# Patient Record
Sex: FEMALE | Race: WHITE | NOT HISPANIC OR LATINO | Employment: OTHER | URBAN - METROPOLITAN AREA
[De-identification: names, ages, dates, MRNs, and addresses within clinical notes are randomized per-mention and may not be internally consistent; named-entity substitution may affect disease eponyms.]

---

## 2017-01-17 DIAGNOSIS — R53.83 OTHER FATIGUE: Primary | ICD-10-CM

## 2017-01-17 RX ORDER — BUPROPION HYDROCHLORIDE 150 MG/1
150 TABLET ORAL EVERY MORNING
Qty: 30 TABLET | Refills: 3 | Status: SHIPPED | OUTPATIENT
Start: 2017-01-17 | End: 2017-05-23

## 2017-01-17 NOTE — TELEPHONE ENCOUNTER
Pending Prescriptions:                       Disp   Refills    buPROPion (WELLBUTRIN XL) 150 MG 24 hr ta*30 tab*3            Sig: Take 1 tablet (150 mg) by mouth every morning             Last Written Prescription Date: 9/26/2016  Last Fill Quantity: 30; # refills: 3  Last Office Visit with [facility], [facility] or [facility] prescribing provider:  9/26/2016        Last PHQ-9 score on record=   PHQ-9 SCORE 7/21/2016   Total Score 0       AST       17   3/4/2016  ALT       35   3/4/2016

## 2017-01-17 NOTE — TELEPHONE ENCOUNTER
Prescription approved per [facility] [place] [place] Refill Protocol.  Lindsay Mccrary RN

## 2017-02-06 DIAGNOSIS — E78.5 HYPERLIPIDEMIA WITH TARGET LDL LESS THAN 130: Primary | ICD-10-CM

## 2017-02-06 RX ORDER — SIMVASTATIN 20 MG
20 TABLET ORAL AT BEDTIME
Qty: 90 TABLET | Refills: 0 | Status: SHIPPED | OUTPATIENT
Start: 2017-02-06 | End: 2017-06-27

## 2017-02-06 NOTE — Clinical Note
[address], [address]  [facility] [address]  Phone: [phone]  Fax: [phone]    02/06/2017    Brittany Chan  [address]  [facility] [address]      Dear Brittany:     We recently received a call from your pharmacy requesting a refill of your medication simvastatin (ZOCOR)    A review of your chart indicates that an appointment is required for labs to recheck lipidsr. Please call the clinic at [phone] to schedule your appointment.    We have authorized one refill of your medication to allow time for you to schedule your appointment.    Taking care of your health is important to us, and ongoing visits with your provider are vital to your care.  We look forward to seeing you in the near future.          Sincerely,      Sadaf Polanco MD/Brandy SEVILLA RN

## 2017-02-06 NOTE — TELEPHONE ENCOUNTER
simvastatin (ZOCOR) 20 MG tablet  Last Written Prescription Date: 03/11/2016  Last Fill Quantity: 90, # refills: 3  Last Office Visit with [facility], [facility] or [facility] prescribing provider: 09/01/2016       CHOL      192   3/4/2016  HDL       79   3/4/2016  LDL       94   3/4/2016  TRIG       93   3/4/2016  CHOLHDLRATIO      2.3   10/31/2014

## 2017-02-06 NOTE — TELEPHONE ENCOUNTER
Future lipids ordered, letter sent to schedule labs, please sign if appropriate  Brandy Vickers RN, BS  Clinical Nurse Triage .

## 2017-02-06 NOTE — TELEPHONE ENCOUNTER
Medication is being filled for 1 time refill only due to:  Due for labs in March    Brandy Vickers RN, BS  Clinical Nurse Triage .

## 2017-02-10 ENCOUNTER — TELEPHONE (OUTPATIENT)
Dept: FAMILY MEDICINE | Facility: CLINIC | Age: 62
End: 2017-02-10

## 2017-02-10 NOTE — TELEPHONE ENCOUNTER
Pt struggled with cell phone speaker first 40 seconds of returning our call. We waited for her to sort this out.    Pt calls requesting fasting lab orders now. Pt interpreted 2/6/17 letter as needing lipids now as only one mo refill sent to pharmacy   Informed due for fasting annual visit with Dr. Polanco on or after 3/20/17 - a 90 day refill simvastatin was sent 2/6/17 so should be covered through next visit   And in good humor pt we said good luck figuring out your mobile phone/car technology!  Cristopher Tejada, RN

## 2017-05-16 ENCOUNTER — TELEPHONE (OUTPATIENT)
Dept: FAMILY MEDICINE | Facility: CLINIC | Age: 62
End: 2017-05-16

## 2017-05-16 DIAGNOSIS — E78.5 HYPERLIPIDEMIA WITH TARGET LDL LESS THAN 130: ICD-10-CM

## 2017-05-16 DIAGNOSIS — E28.2 PCOS (POLYCYSTIC OVARIAN SYNDROME): Primary | ICD-10-CM

## 2017-05-16 NOTE — TELEPHONE ENCOUNTER
"Reason for call: Order   Order or referral being requested: LDL, CBC, BMP, A1C, MMR Titers and \"anything else you think she may need\"    Reason for request: pt would like to have labs done before px appt 6/2/17 to be able to discuss at time of appointment.     Date needed: before my next appointment  Has the patient been seen by the PCP for this problem? Not Applicable    Additional comments: LDL order already in, pls call pt to schedule fasting labs after ordered    Phone Number Pt can be reached at: Home number on file [phone] (home)  Best Time: any  Can we leave a detailed message on this number? YES       Everett Doll   05/16/17       "

## 2017-05-23 DIAGNOSIS — R53.83 OTHER FATIGUE: ICD-10-CM

## 2017-05-23 NOTE — TELEPHONE ENCOUNTER
buPROPion (WELLBUTRIN XL) 150 MG 24 hr tablet       Last Written Prescription Date: 1/17/17  Last Fill Quantity: 30; # refills: 3  Last Office Visit with [facility], [facility] or [facility] prescribing provider:  9/26/2016   Next 5 appointments (look out 90 days)     Jun 02, 2017  9:20 AM CDT   PHYSICAL with Sadaf [facility]anco MD   [facility]    [address] [place] [address], [address]  [facility] [address] [phone]                   Last PHQ-9 score on record=   PHQ-9 SCORE 7/21/2016   Total Score 0       Lab Results   Component Value Date    AST 17 03/04/2016     Lab Results   Component Value Date    ALT 35 03/04/2016       HUMZA Jones  May 23, 2017  6:37 PM

## 2017-05-24 RX ORDER — BUPROPION HYDROCHLORIDE 150 MG/1
150 TABLET ORAL EVERY MORNING
Qty: 30 TABLET | Refills: 0 | Status: SHIPPED | OUTPATIENT
Start: 2017-05-24 | End: 2017-06-26

## 2017-05-24 NOTE — TELEPHONE ENCOUNTER
Prescription approved per [facility] [place] Refill Protocol.  Lindsay Mccrayr RN

## 2017-06-02 ENCOUNTER — OFFICE VISIT (OUTPATIENT)
Dept: FAMILY MEDICINE | Facility: CLINIC | Age: 62
End: 2017-06-02
Payer: COMMERCIAL

## 2017-06-02 VITALS
SYSTOLIC BLOOD PRESSURE: 122 MMHG | DIASTOLIC BLOOD PRESSURE: 84 MMHG | TEMPERATURE: 98 F | HEIGHT: 65 IN | HEART RATE: 61 BPM | OXYGEN SATURATION: 100 %

## 2017-06-02 DIAGNOSIS — Z00.00 ENCOUNTER FOR ROUTINE ADULT HEALTH EXAMINATION WITHOUT ABNORMAL FINDINGS: Primary | ICD-10-CM

## 2017-06-02 DIAGNOSIS — E28.2 PCOS (POLYCYSTIC OVARIAN SYNDROME): ICD-10-CM

## 2017-06-02 DIAGNOSIS — E78.5 HYPERLIPIDEMIA WITH TARGET LDL LESS THAN 130: ICD-10-CM

## 2017-06-02 DIAGNOSIS — Z23 NEED FOR MMR VACCINE: ICD-10-CM

## 2017-06-02 DIAGNOSIS — R29.890 LOSS OF HEIGHT: Primary | ICD-10-CM

## 2017-06-02 LAB
ALBUMIN SERPL-MCNC: 4.1 G/DL (ref 3.4–5)
ALP SERPL-CCNC: 76 U/L (ref 40–150)
ALT SERPL W P-5'-P-CCNC: 25 U/L (ref 0–50)
ANION GAP SERPL CALCULATED.3IONS-SCNC: 9 MMOL/L (ref 3–14)
AST SERPL W P-5'-P-CCNC: 20 U/L (ref 0–45)
BILIRUB SERPL-MCNC: 0.4 MG/DL (ref 0.2–1.3)
BUN SERPL-MCNC: 14 MG/DL (ref 7–30)
CALCIUM SERPL-MCNC: 8.7 MG/DL (ref 8.5–10.1)
CHLORIDE SERPL-SCNC: 105 MMOL/L (ref 94–109)
CHOLEST SERPL-MCNC: 201 MG/DL
CO2 SERPL-SCNC: 28 MMOL/L (ref 20–32)
CREAT SERPL-MCNC: 0.66 MG/DL (ref 0.52–1.04)
ERYTHROCYTE [DISTWIDTH] IN BLOOD BY AUTOMATED COUNT: 13 % (ref 10–15)
GFR SERPL CREATININE-BSD FRML MDRD: NORMAL ML/MIN/1.7M2
GLUCOSE SERPL-MCNC: 88 MG/DL (ref 70–99)
HBA1C MFR BLD: 5.4 % (ref 4.3–6)
HCT VFR BLD AUTO: 43.3 % (ref 35–47)
HDLC SERPL-MCNC: 92 MG/DL
HGB BLD-MCNC: 14.2 G/DL (ref 11.7–15.7)
LDLC SERPL CALC-MCNC: 97 MG/DL
MCH RBC QN AUTO: 28.9 PG (ref 26.5–33)
MCHC RBC AUTO-ENTMCNC: 32.8 G/DL (ref 31.5–36.5)
MCV RBC AUTO: 88 FL (ref 78–100)
NONHDLC SERPL-MCNC: 109 MG/DL
PLATELET # BLD AUTO: 243 10E9/L (ref 150–450)
POTASSIUM SERPL-SCNC: 4.1 MMOL/L (ref 3.4–5.3)
PROT SERPL-MCNC: 7.3 G/DL (ref 6.8–8.8)
RBC # BLD AUTO: 4.91 10E12/L (ref 3.8–5.2)
SODIUM SERPL-SCNC: 142 MMOL/L (ref 133–144)
TRIGL SERPL-MCNC: 61 MG/DL
WBC # BLD AUTO: 4.4 10E9/L (ref 4–11)

## 2017-06-02 PROCEDURE — 36415 COLL VENOUS BLD VENIPUNCTURE: CPT | Performed by: FAMILY MEDICINE

## 2017-06-02 PROCEDURE — 86803 HEPATITIS C AB TEST: CPT | Performed by: FAMILY MEDICINE

## 2017-06-02 PROCEDURE — 90471 IMMUNIZATION ADMIN: CPT | Performed by: FAMILY MEDICINE

## 2017-06-02 PROCEDURE — 83036 HEMOGLOBIN GLYCOSYLATED A1C: CPT | Performed by: FAMILY MEDICINE

## 2017-06-02 PROCEDURE — 82306 VITAMIN D 25 HYDROXY: CPT | Performed by: FAMILY MEDICINE

## 2017-06-02 PROCEDURE — 99396 PREV VISIT EST AGE 40-64: CPT | Mod: 25 | Performed by: FAMILY MEDICINE

## 2017-06-02 PROCEDURE — 80053 COMPREHEN METABOLIC PANEL: CPT | Performed by: FAMILY MEDICINE

## 2017-06-02 PROCEDURE — 80061 LIPID PANEL: CPT | Performed by: FAMILY MEDICINE

## 2017-06-02 PROCEDURE — 90707 MMR VACCINE SC: CPT | Performed by: FAMILY MEDICINE

## 2017-06-02 PROCEDURE — 99207 ZZC NO CHARGE NURSE ONLY: CPT | Performed by: INTERNAL MEDICINE

## 2017-06-02 PROCEDURE — 85027 COMPLETE CBC AUTOMATED: CPT | Performed by: FAMILY MEDICINE

## 2017-06-02 RX ORDER — METFORMIN HCL 500 MG
500 TABLET, EXTENDED RELEASE 24 HR ORAL
Qty: 30 TABLET | Refills: 1 | Status: SHIPPED | OUTPATIENT
Start: 2017-06-02 | End: 2017-07-25

## 2017-06-02 RX ORDER — PHENOL 1.4 %
AEROSOL, SPRAY (ML) MUCOUS MEMBRANE
Status: CANCELLED | COMMUNITY
Start: 2017-06-02

## 2017-06-02 RX ORDER — NAPROXEN SODIUM 220 MG
220 TABLET ORAL DAILY
Status: CANCELLED | COMMUNITY
Start: 2017-06-02

## 2017-06-02 RX ORDER — LANOLIN ALCOHOL/MO/W.PET/CERES
CREAM (GRAM) TOPICAL
Status: CANCELLED | COMMUNITY
Start: 2017-06-02

## 2017-06-02 RX ORDER — COVID-19 ANTIGEN TEST
440 KIT MISCELLANEOUS 2 TIMES DAILY WITH MEALS
COMMUNITY
End: 2019-07-05

## 2017-06-02 RX ORDER — LANOLIN ALCOHOL/MO/W.PET/CERES
3 CREAM (GRAM) TOPICAL
COMMUNITY

## 2017-06-02 NOTE — PROGRESS NOTES
SUBJECTIVE:     CC: Brittany Chan is an 62 year old woman who presents for preventive health visit.     Healthy Habits:    Do you get at least three servings of calcium containing foods daily (dairy, green leafy vegetables, etc.)? yes    Amount of exercise or daily activities, outside of work: 3 day(s) per week    Problems taking medications regularly No    Medication side effects: Yes urine is bright yellow    Have you had an eye exam in the past two years? yes    Do you see a dentist twice per year? yes    Do you have sleep apnea, excessive snoring or daytime drowsiness?no          Today's PHQ-2 Score:   PHQ-2 ( 1999 Pfizer) 3/18/2016 3/15/2016   Q1: Little interest or pleasure in doing things 0 -   Q2: Feeling down, depressed or hopeless 0 -   PHQ-2 Score 0 -   Q1: Little interest or pleasure in doing things - Not at all   Q2: Feeling down, depressed or hopeless - Not at all   PHQ-2 Score - 0     Abuse: Current or Past(Physical, Sexual or Emotional)- No  Do you feel safe in your environment - Yes    Social History   Substance Use Topics     Smoking status: Former Smoker     Quit date: 1/1/1980     Smokeless tobacco: Never Used     Alcohol use Yes      Comment: weekends     The patient does not drink >3 drinks per day nor >7 drinks per week.    Recent Labs   Lab Test  03/04/16   0811  10/31/14   0846  10/29/13   0825   CHOL  192  180  180   HDL  79  79  81   LDL  94  87  86   TRIG  93  68  69   CHOLHDLRATIO   --   2.3  2.2   NHDL  113   --    --        Reviewed orders with patient.  Reviewed health maintenance and updated orders accordingly - Yes    Mammo Decision Support:  {Mammo:296651}    Pertinent mammograms are reviewed under the imaging tab.  History of abnormal Pap smear: {PAP HX:169546}    Reviewed and updated as needed this visit by clinical staff  Tobacco  Allergies  Meds  Med Hx  Surg Hx  Fam Hx  Soc Hx        Reviewed and updated as needed this visit by Provider        {HISTORY  "OPTIONS:765048}    ROS:  {FEMALE PREVENTATIVE ROS:338484}    This document serves as a record of the services and decisions personally performed and made by Sadaf Polanco MD. It was created on her behalf by Renita Mcdowell, a trained medical scribe. The creation of this document is based the provider's statements to the medical scribe.  Renita Jeremisa 2017 9:44 AM    OBJECTIVE:     /84 (BP Location: Right arm, Patient Position: Chair, Cuff Size: Adult Regular)  Pulse 61  Temp 98  F (36.7  C) (Oral)  Ht 5' 4.5\" (1.638 m)  LMP 10/05/2012  SpO2 100%  EXAM:  {Exam Choices:433355}    ASSESSMENT/PLAN:     {Diag Picklist:658991}    COUNSELING:   {FEMALE COUNSELING MESSAGES:301801::\"Reviewed preventive health counseling, as reflected in patient instructions\"}    {Blood Pressure Screenin}     reports that she quit smoking about 37 years ago. She has never used smokeless tobacco.    Estimated body mass index is 27.46 kg/(m^2) as calculated from the following:    Height as of 16: 5' 5\" (1.651 m).    Weight as of 16: 165 lb (74.8 kg).   Weight management plan: Discussed healthy diet and exercise guidelines and patient will follow up in 12 months in clinic to re-evaluate.    Counseling Resources:  ATP IV Guidelines  Pooled Cohorts Equation Calculator  Breast Cancer Risk Calculator  FRAX Risk Assessment  ICSI Preventive Guidelines  Dietary Guidelines for Americans,   USDA's MyPlate  ASA Prophylaxis  Lung CA Screening    The information in this document, created by the medical scribe for me, accurately reflects the services I personally performed and the decisions made by me. I have reviewed and approved this document for accuracy prior to leaving the patient care area.  Sadaf Polanco MD 9:44 AM 2017     ***     Sadaf Polanco MD  [facility]  "

## 2017-06-02 NOTE — MR AVS SNAPSHOT
After Visit Summary   6/2/2017    Brittany Chan    MRN: [id number]           Patient Information     Date Of Birth          1955        Visit Information        Provider Department      6/2/2017 9:20 AM Sadaf Polanco MD [facility]        Today's Diagnoses     Hyperlipidemia with target LDL less than 130    -  1    Encounter for routine adult health examination without abnormal findings        Need for MMR vaccine          Care Instructions      Preventive Health Recommendations  Female Ages 50 - 64    Yearly exam: See your health care provider every year in order to  o Review health changes.   o Discuss preventive care.    o Review your medicines if your doctor has prescribed any.      Get a Pap test every three years (unless you have an abnormal result and your provider advises testing more often).    If you get Pap tests with HPV test, you only need to test every 5 years, unless you have an abnormal result.     You do not need a Pap test if your uterus was removed (hysterectomy) and you have not had cancer.    You should be tested each year for STDs (sexually transmitted diseases) if you're at risk.     Have a mammogram every 1 to 2 years.    Have a colonoscopy at age 50, or have a yearly FIT test (stool test). These exams screen for colon cancer.      Have a cholesterol test every 5 years, or more often if advised.    Have a diabetes test (fasting glucose) every three years. If you are at risk for diabetes, you should have this test more often.     If you are at risk for osteoporosis (brittle bone disease), think about having a bone density scan (DEXA).    Shots: Get a flu shot each year. Get a tetanus shot every 10 years.    Nutrition:     Eat at least 5 servings of fruits and vegetables each day.    Eat whole-grain bread, whole-wheat pasta and brown rice instead of white grains and rice.    Talk to your provider about Calcium and Vitamin D.      Lifestyle    Exercise at least 150 minutes a week (30 minutes a day, 5 days a week). This will help you control your weight and prevent disease.    Limit alcohol to one drink per day.    No smoking.     Wear sunscreen to prevent skin cancer.     See your dentist every six months for an exam and cleaning.    See your eye doctor every 1 to 2 years.            Follow-ups after your visit        Future tests that were ordered for you today     Open Future Orders        Priority Expected Expires Ordered    *MA Screening Digital Bilateral Routine  6/2/2018 6/2/2017            Who to contact     If you have questions or need follow up information about today's clinic visit or your schedule please contact [facility] directly at [phone].  Normal or non-critical lab and imaging results will be communicated to you by PromoteSocialhart, letter or phone within 4 business days after the clinic has received the results. If you do not hear from us within 7 days, please contact the clinic through PromoteSocialhart or phone. If you have a critical or abnormal lab result, we will notify you by phone as soon as possible.  Submit refill requests through TuneUp or call your pharmacy and they will forward the refill request to us. Please allow 3 business days for your refill to be completed.          Additional Information About Your Visit        TuneUp Information     TuneUp gives you secure access to your electronic health record. If you see a primary care provider, you can also send messages to your care team and make appointments. If you have questions, please call your primary care clinic.  If you do not have a primary care provider, please call [phone] and they will assist you.        Care EveryWhere ID     This is your Care EveryWhere ID. This could be used by other organizations to access your [facility] medical records  [id number]        Your Vitals Were     Pulse Temperature Height Last Period Pulse Oximetry        "61 98  F (36.7  C) (Oral) 5' 4.5\" (1.638 m) 10/05/2012 100%        Blood Pressure from Last 3 Encounters:   06/02/17 122/84   09/26/16 122/80   09/01/16 108/74    Weight from Last 3 Encounters:   08/05/16 165 lb (74.8 kg)   10/31/14 167 lb (75.8 kg)   09/29/11 177 lb 9.6 oz (80.6 kg)              We Performed the Following     CBC with platelets     Comprehensive metabolic panel     Hemoglobin A1c     Hepatitis C Screen Reflex to HCV RNA Quant and Genotype     Lipid panel reflex to direct LDL     MMR VIRUS IMMUNIZATION, SUBCUT     Vitamin D Deficiency          Today's Medication Changes          These changes are accurate as of: 6/2/17 10:08 AM.  If you have any questions, ask your nurse or doctor.               These medicines have changed or have updated prescriptions.        Dose/Directions    Chondroitin Sulfate A 150 MG Caps   This may have changed:    - medication strength  - how to take this  - additional instructions   Changed by:  Sadaf Polanco MD        600 mg BID   Refills:  0                Primary Care Provider Office Phone # Fax #    Sadaf Polanco [phone]       [facility] [place] [address]  [address]   [facility] [address]        Thank you!     Thank you for choosing [facility]  for your care. Our goal is always to provide you with excellent care. Hearing back from our patients is one way we can continue to improve our services. Please take a few minutes to complete the written survey that you may receive in the mail after your visit with us. Thank you!             Your Updated Medication List - Protect others around you: Learn how to safely use, store and throw away your medicines at www.disposemymeds.org.          This list is accurate as of: 6/2/17 10:08 AM.  Always use your most recent med list.                   Brand Name Dispense Instructions for use    buPROPion 150 MG 24 hr tablet    WELLBUTRIN XL    30 tablet    Take 1 tablet (150 mg) " by mouth every morning       Chondroitin Sulfate A 150 MG Caps      600 mg BID       ciprofloxacin 500 MG tablet    CIPRO    14 tablet    Take 1 tablet (500 mg) by mouth 2 times daily       ESTROVEN NIGHTTIME PO          ESTROVEN PO          MOTRIN 600 MG tablet   Generic drug:  ibuprofen      1 TABLET 3 TIMES DAILY PRN       nitrofurantoin (macrocrystal-monohydrate) 100 MG capsule    MACROBID    10 capsule    Take 1 capsule (100 mg) by mouth 2 times daily       omeprazole 20 MG CR capsule    priLOSEC    90 capsule    Take 1 capsule by mouth daily.       PROBIOTIC PO          RANITIDINE HCL PO          simvastatin 20 MG tablet    ZOCOR    90 tablet    Take 1 tablet (20 mg) by mouth At Bedtime at bedtime.       vitamin D 1000 UNITS capsule      Take 2 capsules by mouth daily.

## 2017-06-02 NOTE — PROGRESS NOTES
"   SUBJECTIVE:     CC: Brittany Chan is an 62 year old woman who presents for preventive health visit.     Healthy Habits:    Do you get at least three servings of calcium containing foods daily (dairy, green leafy vegetables, etc.)? yes    Amount of exercise or daily activities, outside of work: 3 day(s) per week    Problems taking medications regularly No    Medication side effects: Yes urine is bright yellow    Have you had an eye exam in the past two years? yes    Do you see a dentist twice per year? yes    Do you have sleep apnea, excessive snoring or daytime drowsiness?no    Additional problems:  She would like to have titers done for MMR. Knows she had mumps in her childhood. Unsure if she has been traveling or been in [facility]. Does work for [employer].     Blood pressure: would like to address her BP. Notes her diastolic is not \"what it used to be.\" Checks at home. Does watch her weight on her own. Thinks her BMI is 28 ness. Weight is 159 lbs at home.    Checks weight at home. Refuses to be weighted here  BP Readings from Last 3 Encounters:   06/02/17 122/84   09/26/16 122/80   09/01/16 108/74   Low libido: no pain during sex but orgasms are still not as intense. No vaginal dryness. On wellbutrin to help libido  Family history of breast cancer. Paternal and maternal grandmothers. One was in her 50's.   GERD: Did give up coffee, switched to vick tea and a \"sweet and spicy tea.\" Still takes 20 mg of prilosec and 150 mg of zantac. Has not tried skipping at any point to see if heartburn returns.   Sugars: would like to check sugars. Does mention she has PCOS.   Lab Results   Component Value Date    A1C 5.6 03/04/2016    A1C 5.5 10/31/2014    A1C 5.2 10/29/2013    A1C 5.1 07/01/2010    A1C 5.2 04/28/2009               Today's PHQ-2 Score:   PHQ-2 ( 1999 Pfizer) 3/18/2016 3/15/2016   Q1: Little interest or pleasure in doing things 0 -   Q2: Feeling down, depressed or hopeless 0 -   PHQ-2 Score 0 -   Q1: " Little interest or pleasure in doing things - Not at all   Q2: Feeling down, depressed or hopeless - Not at all   PHQ-2 Score - 0     Abuse: Current or Past(Physical, Sexual or Emotional)- No  Do you feel safe in your environment - Yes    Social History   Substance Use Topics     Smoking status: Former Smoker     Quit date: 1/1/1980     Smokeless tobacco: Never Used     Alcohol use Yes      Comment: weekends     The patient does not drink >3 drinks per day nor >7 drinks per week.    Recent Labs   Lab Test  03/04/16   0811  10/31/14   0846  10/29/13   0825   CHOL  192  180  180   HDL  79  79  81   LDL  94  87  86   TRIG  93  68  69   CHOLHDLRATIO   --   2.3  2.2   NHDL  113   --    --        Reviewed orders with patient.  Reviewed health maintenance and updated orders accordingly - Yes    Mammo Decision Support:  Patient over age 50, mutual decision to screen reflected in health maintenance.    Pertinent mammograms are reviewed under the imaging tab.  History of abnormal Pap smear: YES - updated in Problem List and Health Maintenance accordingly  Lab Results   Component Value Date    PAP NIL 03/18/2016    PAP ASC-US 01/19/2015    PAP ASC-US 10/29/2013         Reviewed and updated as needed this visit by clinical staff  Tobacco  Allergies  Meds  Med Hx  Surg Hx  Fam Hx  Soc Hx        Reviewed and updated as needed this visit by Provider            ROS:  C: NEGATIVE for fever, chills, change in weight  I: NEGATIVE for worrisome rashes, moles or lesions  E: NEGATIVE for vision changes or irritation  ENT: NEGATIVE for ear, mouth and throat problems  R: NEGATIVE for significant cough or SOB  B: NEGATIVE for masses, tenderness or discharge  CV: NEGATIVE for chest pain, palpitations or peripheral edema  GI: NEGATIVE for nausea, abdominal pain, heartburn, or change in bowel habits  : NEGATIVE for unusual urinary or vaginal symptoms. No vaginal bleeding.  M: NEGATIVE for significant arthralgias or myalgia  N: NEGATIVE  "for weakness, dizziness or paresthesias  P: NEGATIVE for changes in mood or affect     This document serves as a record of the services and decisions personally performed and made by Sadaf Polanco MD. It was created on her behalf by Renita Mcdowell, a trained medical scribe. The creation of this document is based the provider's statements to the medical scribe.  Renita Mcdowell June 2, 2017 9:44 AM    OBJECTIVE:     /84 (BP Location: Right arm, Patient Position: Chair, Cuff Size: Adult Regular)  Pulse 61  Temp 98  F (36.7  C) (Oral)  Ht 5' 4.5\" (1.638 m)  LMP 10/05/2012  SpO2 100%  EXAM:  GENERAL APPEARANCE: healthy, alert and no distress  EYES: Eyes grossly normal to inspection, PERRL and conjunctivae and sclerae normal  HENT: ear canals and TM's normal, nose and mouth without ulcers or lesions, oropharynx clear and oral mucous membranes moist  NECK: no adenopathy, no asymmetry, masses, or scars and thyroid normal to palpation  RESP: lungs clear to auscultation - no rales, rhonchi or wheezes  BREAST: normal without masses, tenderness or nipple discharge and no palpable axillary masses or adenopathy  CV: regular rate and rhythm, normal S1 S2, no S3 or S4, no murmur, click or rub, no peripheral edema and peripheral pulses strong  ABDOMEN: soft, nontender, no hepatosplenomegaly, no masses and bowel sounds normal  MS: no musculoskeletal defects are noted and gait is age appropriate without ataxia  SKIN: no suspicious lesions or rashes  NEURO: Normal strength and tone, sensory exam grossly normal, mentation intact and speech normal  PSYCH: mentation appears normal and affect normal/bright    ASSESSMENT/PLAN:     (Z00.00) Encounter for routine adult health examination without abnormal findings  (primary encounter diagnosis)  Comment: Normal exam. Mammogram ordered. Fasting for labs today.Discussed healthy diet and exercise, especially to help prevent diabetes. For GERD-advised to try going every other day with " "prilosec. If heart burns returns ok to go back to everyday.   Plan: *MA Screening Digital Bilateral, Lipid panel         reflex to direct LDL, Comprehensive metabolic         panel, Vitamin D Deficiency, CBC with         platelets, Hepatitis C Screen Reflex to HCV RNA        Quant and Genotype, Hemoglobin A1c        Follow up in one year for physical.     (E78.5) Hyperlipidemia with target LDL less than 130  Comment: Fasting for lipid panel.   Plan: CANCELED: Lipid panel reflex to direct LDL        Follow up in one year.     (Z23) Need for MMR vaccine  Comment: Discussed titers vs vaccine. Patient agreed to vaccine.   Plan: MMR VIRUS IMMUNIZATION, SUBCUT        Follow up as needed.     (E28.2) PCOS (polycystic ovarian syndrome)  Comment: Will start on metformin once a day to help control sugars. Discussed healthy diet, exercise, and weight loss will all help prevent diabetes. Recommend weight loss  Plan: metFORMIN (GLUCOPHAGE-XR) 500 MG 24 hr tablet        Follow up in 1-2 months.       COUNSELING:   Reviewed preventive health counseling, as reflected in patient instructions       Regular exercise       Healthy diet/nutrition       Vision screening       Hearing screening       Immunizations    Vaccinated for: MMR  , pt asking for titers, discussed this vs vaccine, pt chose vaccine         Alcohol Use    BP Screening:   Last 3 BP Readings:    BP Readings from Last 3 Encounters:   06/02/17 122/84   09/26/16 122/80   09/01/16 108/74       The following was recommended to the patient:  Re-screen BP within a year and recommended lifestyle modifications     reports that she quit smoking about 37 years ago. She has never used smokeless tobacco.    Estimated body mass index is 27.46 kg/(m^2) as calculated from the following:    Height as of 8/5/16: 5' 5\" (1.651 m).    Weight as of 8/5/16: 165 lb (74.8 kg).   Weight management plan: Discussed healthy diet and exercise guidelines and patient will follow up in 12 months in " clinic to re-evaluate.    Counseling Resources:  ATP IV Guidelines  Pooled Cohorts Equation Calculator  Breast Cancer Risk Calculator  FRAX Risk Assessment  ICSI Preventive Guidelines  Dietary Guidelines for Americans, 2010  USDA's MyPlate  ASA Prophylaxis  Lung CA Screening    The information in this document, created by the medical scribe for me, accurately reflects the services I personally performed and the decisions made by me. I have reviewed and approved this document for accuracy prior to leaving the patient care area.  Sadaf Polanco MD 9:44 AM 6/2/2017          Sadaf Polanco MD  [facility]

## 2017-06-02 NOTE — NURSING NOTE
"Chief Complaint   Patient presents with     Physical     fasting; no pap needed       Initial /84 (BP Location: Right arm, Patient Position: Chair, Cuff Size: Adult Regular)  Pulse 61  Temp 98  F (36.7  C) (Oral)  Ht 5' 4.5\" (1.638 m)  LMP 10/05/2012  SpO2 100% Estimated body mass index is 27.46 kg/(m^2) as calculated from the following:    Height as of 8/5/16: 5' 5\" (1.651 m).    Weight as of 8/5/16: 165 lb (74.8 kg).  Medication Reconciliation: incomplete   Mandy Maurice CMA ([facility])      "

## 2017-06-05 LAB
DEPRECATED CALCIDIOL+CALCIFEROL SERPL-MC: 54 UG/L (ref 20–75)
HCV AB SERPL QL IA: NORMAL

## 2017-06-23 ENCOUNTER — HOSPITAL ENCOUNTER (OUTPATIENT)
Dept: MAMMOGRAPHY | Facility: CLINIC | Age: 62
Discharge: HOME | End: 2017-06-23
Attending: FAMILY MEDICINE | Admitting: FAMILY MEDICINE
Payer: COMMERCIAL

## 2017-06-23 DIAGNOSIS — Z00.00 ENCOUNTER FOR ROUTINE ADULT HEALTH EXAMINATION WITHOUT ABNORMAL FINDINGS: ICD-10-CM

## 2017-06-23 PROCEDURE — G0202 SCR MAMMO BI INCL CAD: HCPCS

## 2017-06-26 DIAGNOSIS — R53.83 OTHER FATIGUE: ICD-10-CM

## 2017-06-26 NOTE — TELEPHONE ENCOUNTER
buPROPion (WELLBUTRIN XL) 150 MG 24 hr tablet       Last Written Prescription Date: 5/24/17  Last Fill Quantity: 30; # refills: 0  Last Office Visit with G, P or [facility] prescribing provider:  6/2/2017        Last PHQ-9 score on record=   PHQ-9 SCORE 7/21/2016   Total Score 0       Lab Results   Component Value Date    AST 20 06/02/2017     Lab Results   Component Value Date    ALT 25 06/02/2017     HUMZA Jones  June 26, 2017  2:33 PM

## 2017-06-27 DIAGNOSIS — E78.5 HYPERLIPIDEMIA WITH TARGET LDL LESS THAN 130: ICD-10-CM

## 2017-06-27 RX ORDER — SIMVASTATIN 20 MG
20 TABLET ORAL AT BEDTIME
Qty: 90 TABLET | Refills: 3 | Status: SHIPPED | OUTPATIENT
Start: 2017-06-27 | End: 2018-06-19

## 2017-06-27 RX ORDER — BUPROPION HYDROCHLORIDE 150 MG/1
150 TABLET ORAL EVERY MORNING
Qty: 30 TABLET | Refills: 5 | Status: SHIPPED | OUTPATIENT
Start: 2017-06-27 | End: 2017-12-13

## 2017-06-27 NOTE — TELEPHONE ENCOUNTER
Prescription approved per [facility] Refill Protocol.  Lindsay Mccrary RN

## 2017-06-27 NOTE — TELEPHONE ENCOUNTER
Pending Prescriptions:                       Disp   Refills    simvastatin (ZOCOR) 20 MG tablet          90 tab*0            Sig: Take 1 tablet (20 mg) by mouth At Bedtime at           bedtime.  Simvastatin      Last Written Prescription Date: 2/6/2017  Last Fill Quantity: 90, # refills: 0  Last Office Visit with [facility], [facility] or [facility] prescribing provider: 6/23/2017       Lab Results   Component Value Date    CHOL 201 06/02/2017     Lab Results   Component Value Date    HDL 92 06/02/2017     Lab Results   Component Value Date    LDL 97 06/02/2017     Lab Results   Component Value Date    TRIG 61 06/02/2017     Lab Results   Component Value Date    CHOLHDLRATIO 2.3 10/31/2014

## 2017-06-29 ENCOUNTER — TELEPHONE (OUTPATIENT)
Dept: FAMILY MEDICINE | Facility: CLINIC | Age: 62
End: 2017-06-29

## 2017-06-29 NOTE — TELEPHONE ENCOUNTER
Spoke with pharmacist.  Patient has not had this rx filled since July 2016.  Medication was discontinued.  RX was on auto refill.  Pharmacist will disregard.  RX request canceled.  Lindsay Mccrary RN

## 2017-07-25 DIAGNOSIS — E28.2 PCOS (POLYCYSTIC OVARIAN SYNDROME): ICD-10-CM

## 2017-07-25 NOTE — TELEPHONE ENCOUNTER
Pending Prescriptions:                       Disp   Refills    metFORMIN (GLUCOPHAGE-XR) 500 MG 24 hr ta*30 tab*1            Sig: Take 1 tablet (500 mg) by mouth daily (with           dinner)    metFORMIN (GLUCOPHAGE-XR) 500 MG 24 hr tablet         Last Written Prescription Date: 06/12/17  Last Fill Quantity: 30, # refills: 1  Last Office Visit with [facility] or [facility] prescribing provider:  06/12/17        BP Readings from Last 3 Encounters:   06/02/17 122/84   09/26/16 122/80   09/01/16 108/74     No results found for: MICROL  No results found for: UMALCR  Creatinine   Date Value Ref Range Status   06/02/2017 0.66 0.52 - 1.04 mg/dL Final   ]  GFR Estimate   Date Value Ref Range Status   06/02/2017 >90  Non  GFR Calc   >60 mL/min/1.7m2 Final   03/04/2016 90 >60 mL/min/1.7m2 Final     Comment:     Non  GFR Calc   01/19/2015 >90  Non  GFR Calc   >60 mL/min/1.7m2 Final     GFR Estimate If Black   Date Value Ref Range Status   06/02/2017 >90   GFR Calc   >60 mL/min/1.7m2 Final   03/04/2016 >90   GFR Calc   >60 mL/min/1.7m2 Final   01/19/2015 >90   GFR Calc   >60 mL/min/1.7m2 Final     Lab Results   Component Value Date    CHOL 201 06/02/2017     Lab Results   Component Value Date    HDL 92 06/02/2017     Lab Results   Component Value Date    LDL 97 06/02/2017     Lab Results   Component Value Date    TRIG 61 06/02/2017     Lab Results   Component Value Date    CHOLHDLRATIO 2.3 10/31/2014     Lab Results   Component Value Date    AST 20 06/02/2017     Lab Results   Component Value Date    ALT 25 06/02/2017     Lab Results   Component Value Date    A1C 5.4 06/02/2017    A1C 5.6 03/04/2016    A1C 5.5 10/31/2014    A1C 5.2 10/29/2013    A1C 5.1 07/01/2010     Potassium   Date Value Ref Range Status   06/02/2017 4.1 3.4 - 5.3 mmol/L Final

## 2017-07-27 RX ORDER — METFORMIN HCL 500 MG
500 TABLET, EXTENDED RELEASE 24 HR ORAL
Qty: 30 TABLET | Refills: 4 | Status: SHIPPED | OUTPATIENT
Start: 2017-07-27 | End: 2017-12-15

## 2017-08-25 ENCOUNTER — TELEPHONE (OUTPATIENT)
Dept: FAMILY MEDICINE | Facility: CLINIC | Age: 62
End: 2017-08-25

## 2017-08-25 DIAGNOSIS — S70.261A TICK BITE OF HIP, RIGHT, INITIAL ENCOUNTER: Primary | ICD-10-CM
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
## 2017-08-25 RX ORDER — DOXYCYCLINE HYCLATE 100 MG
200 TABLET ORAL ONCE
Qty: 2 TABLET | Refills: 0 | Status: SHIPPED | OUTPATIENT
Start: 2017-08-25 | End: 2017-08-25

## 2017-08-25 NOTE — TELEPHONE ENCOUNTER
Possible tick bite, small rash, may was insect bite, is not sure  Wondering if she should get treated?    Will order doxy

## 2017-12-13 DIAGNOSIS — R53.83 OTHER FATIGUE: ICD-10-CM

## 2017-12-13 RX ORDER — BUPROPION HYDROCHLORIDE 150 MG/1
150 TABLET ORAL EVERY MORNING
Qty: 90 TABLET | Refills: 1 | Status: SHIPPED | OUTPATIENT
Start: 2017-12-13 | End: 2018-06-18

## 2017-12-13 NOTE — TELEPHONE ENCOUNTER
Last Office Visit with [facility], [facility] or [facility] prescribing provider: 6/2/2017

## 2017-12-13 NOTE — TELEPHONE ENCOUNTER
Requested Prescriptions   Pending Prescriptions Disp Refills     buPROPion (WELLBUTRIN XL) 150 MG 24 hr tablet 30 tablet 5     Sig: Take 1 tablet (150 mg) by mouth every morning    SSRIs Protocol Failed    12/13/2017  3:19 PM       Failed - Medication is NOT Bupropion    If the medication is Bupropion (Wellbutrin), and the patient is taking for smoking cessation; OK to refill.         Passed - Recent or future visit with authorizing provider    Patient had office visit in the last year or has a visit in the next 30 days with authorizing provider.  See chart review.              Passed - Patient is age 18 or older       Passed - No active pregnancy on record       Passed - No positive pregnancy test in last 12 months        Prescription approved per [facility] Refill Protocol.    Goivanna Flynn RN -- [facility]

## 2017-12-15 DIAGNOSIS — E28.2 PCOS (POLYCYSTIC OVARIAN SYNDROME): ICD-10-CM

## 2017-12-15 NOTE — TELEPHONE ENCOUNTER
Requested Prescriptions   Pending Prescriptions Disp Refills     metFORMIN (GLUCOPHAGE-XR) 500 MG 24 hr tablet 30 tablet 4    Last Written Prescription Date:  7/27/17  Last Fill Quantity: 30,  # refills: 4   Last Office Visit with G, P or [facility] prescribing provider:  6/2/2017   Future Office Visit:      Sig: Take 1 tablet (500 mg) by mouth daily (with dinner)    Biguanide Agents Failed    12/15/2017  1:29 PM       Failed - Patient's BP is less than 140/90    BP Readings from Last 3 Encounters:   06/02/17 122/84   09/26/16 122/80   09/01/16 108/74          Passed - Patient is age 10 or older       Passed - Recent (12 mos) or future (30 days) visit with authorizing provider's specialty     Patient had office visit in the last year or has a visit in the next 30 days with authorizing provider.  See chart review.        Passed - Patient does NOT have a diagnosis of CHF.       Passed - Patient is not pregnant       Passed - Patient has not had a positive pregnancy test within the past 12 mos.

## 2017-12-18 NOTE — TELEPHONE ENCOUNTER
Not PSO diagnosis.  Due for visit also.  Sent to provider.  Meaghan Ledbetter RN    Associated Diagnoses      PCOS (polycystic ovarian syndrome) [E28.2]             6/2/17  ASSESSMENT/PLAN:      (Z00.00) Encounter for routine adult health examination without abnormal findings  (primary encounter diagnosis)  Comment: Normal exam. Mammogram ordered. Fasting for labs today.Discussed healthy diet and exercise, especially to help prevent diabetes. For GERD-advised to try going every other day with prilosec. If heart burns returns ok to go back to everyday.   Plan: *MA Screening Digital Bilateral, Lipid panel         reflex to direct LDL, Comprehensive metabolic         panel, Vitamin D Deficiency, CBC with         platelets, Hepatitis C Screen Reflex to HCV RNA        Quant and Genotype, Hemoglobin A1c        Follow up in one year for physical.      (E78.5) Hyperlipidemia with target LDL less than 130  Comment: Fasting for lipid panel.   Plan: CANCELED: Lipid panel reflex to direct LDL        Follow up in one year.      (Z23) Need for MMR vaccine  Comment: Discussed titers vs vaccine. Patient agreed to vaccine.   Plan: MMR VIRUS IMMUNIZATION, SUBCUT        Follow up as needed.      (E28.2) PCOS (polycystic ovarian syndrome)  Comment: Will start on metformin once a day to help control sugars. Discussed healthy diet, exercise, and weight loss will all help prevent diabetes. Recommend weight loss  Plan: metFORMIN (GLUCOPHAGE-XR) 500 MG 24 hr tablet        Follow up in 1-2 months.

## 2017-12-19 RX ORDER — METFORMIN HCL 500 MG
500 TABLET, EXTENDED RELEASE 24 HR ORAL
Qty: 30 TABLET | Refills: 0 | Status: SHIPPED | OUTPATIENT
Start: 2017-12-19 | End: 2018-01-15

## 2018-01-15 DIAGNOSIS — E28.2 PCOS (POLYCYSTIC OVARIAN SYNDROME): ICD-10-CM

## 2018-01-15 RX ORDER — METFORMIN HCL 500 MG
500 TABLET, EXTENDED RELEASE 24 HR ORAL
Qty: 30 TABLET | Refills: 5 | Status: SHIPPED | OUTPATIENT
Start: 2018-01-15 | End: 2018-07-02

## 2018-01-15 NOTE — TELEPHONE ENCOUNTER
BP Readings from Last 3 Encounters:   06/02/17 122/84   09/26/16 122/80   09/01/16 108/74     Prescription approved per [facility] Refill Protocol.  Lindsay Mccrary RN

## 2018-01-15 NOTE — TELEPHONE ENCOUNTER
Pending Prescriptions:                       Disp   Refills    metFORMIN (GLUCOPHAGE-XR) 500 MG 24 hr ta*30 tab*0            Sig: Take 1 tablet (500 mg) by mouth daily (with           dinner)    metFORMIN (GLUCOPHAGE-XR) 500 MG 24 hr tablet  Last Written Prescription Date:  12/19/17  Last Fill Quantity: 30,  # refills: 0   Last Office Visit with [facility], [facility] or [facility] prescribing provider:  06/02/17     Future Office Visit:

## 2018-06-18 DIAGNOSIS — R53.83 OTHER FATIGUE: ICD-10-CM

## 2018-06-18 NOTE — TELEPHONE ENCOUNTER
"buPROPion (WELLBUTRIN XL) 150 MG 24 hr tablet  Last Written Prescription Date:  12/13/17  Last Fill Quantity: 90,  # refills: 1   Last office visit: 6/2/2017 with prescribing provider:  Collin     Future Office Visit:      Requested Prescriptions   Pending Prescriptions Disp Refills     buPROPion (WELLBUTRIN XL) 150 MG 24 hr tablet 90 tablet 1     Sig: Take 1 tablet (150 mg) by mouth every morning    SSRIs Protocol Failed    6/18/2018  2:46 PM       Failed - Recent (12 mo) or future (30 days) visit within the authorizing provider's specialty    Patient had office visit in the last 12 months or has a visit in the next 30 days with authorizing provider or within the authorizing provider's specialty.  See \"Patient Info\" tab in inbasket, or \"Choose Columns\" in Meds & Orders section of the refill encounter.           Passed - Medication is Bupropion    If the medication is Bupropion (Wellbutrin), and the patient is taking for smoking cessation; OK to refill.         Passed - Patient is age 18 or older       Passed - No active pregnancy on record       Passed - No positive pregnancy test in last 12 months          "

## 2018-06-19 DIAGNOSIS — E78.5 HYPERLIPIDEMIA WITH TARGET LDL LESS THAN 130: ICD-10-CM

## 2018-06-19 RX ORDER — BUPROPION HYDROCHLORIDE 150 MG/1
150 TABLET ORAL EVERY MORNING
Qty: 90 TABLET | Refills: 0 | Status: SHIPPED | OUTPATIENT
Start: 2018-06-19 | End: 2018-09-07

## 2018-06-19 RX ORDER — SIMVASTATIN 20 MG
20 TABLET ORAL AT BEDTIME
Qty: 90 TABLET | Refills: 0 | Status: SHIPPED | OUTPATIENT
Start: 2018-06-19 | End: 2018-07-27

## 2018-06-19 NOTE — TELEPHONE ENCOUNTER
Patient due for appointment.  Spoke with patient.  Appointment scheduled for 7/27/2018 (first opening that works with her schedule).  Will give refill to get patient through.     Lindsay Mccrary RN

## 2018-06-19 NOTE — TELEPHONE ENCOUNTER
"Requested Prescriptions   Pending Prescriptions Disp Refills     simvastatin (ZOCOR) 20 MG tablet 90 tablet 3    Last Written Prescription Date:  6/27/17  Last Fill Quantity: 90,  # refills: 3   Last Office Visit: 6/2/2017   Future Office Visit:    Next 5 appointments (look out 90 days)     Jul 27, 2018  8:20 AM CDT   [facility]    [address] [place] [address], [address]  [facility] [address] [phone]                  Sig: Take 1 tablet (20 mg) by mouth At Bedtime at bedtime.    Statins Protocol Failed    6/19/2018 10:53 AM       Failed - LDL on file in past 12 months    Recent Labs   Lab Test  06/02/17   1029   LDL  97            Failed - Recent (12 mo) or future (30 days) visit within the authorizing provider's specialty    Patient had office visit in the last 12 months or has a visit in the next 30 days with authorizing provider or within the authorizing provider's specialty.  See \"Patient Info\" tab in inbasket, or \"Choose Columns\" in Meds & Orders section of the refill encounter.           Passed - No abnormal creatine kinase in past 12 months    No lab results found.            Passed - Patient is age 18 or older       Passed - No active pregnancy on record       Passed - No positive pregnancy test in past 12 months          "

## 2018-07-27 ENCOUNTER — OFFICE VISIT (OUTPATIENT)
Dept: FAMILY MEDICINE | Facility: CLINIC | Age: 63
End: 2018-07-27
Payer: COMMERCIAL

## 2018-07-27 VITALS
SYSTOLIC BLOOD PRESSURE: 120 MMHG | HEART RATE: 64 BPM | TEMPERATURE: 98.2 F | HEIGHT: 65 IN | DIASTOLIC BLOOD PRESSURE: 86 MMHG | BODY MASS INDEX: 27.24 KG/M2 | WEIGHT: 163.5 LBS | RESPIRATION RATE: 16 BRPM

## 2018-07-27 DIAGNOSIS — B35.1 TOENAIL FUNGUS: ICD-10-CM

## 2018-07-27 DIAGNOSIS — Z12.31 ENCOUNTER FOR SCREENING MAMMOGRAM FOR BREAST CANCER: ICD-10-CM

## 2018-07-27 DIAGNOSIS — Z11.4 SCREENING FOR HIV (HUMAN IMMUNODEFICIENCY VIRUS): ICD-10-CM

## 2018-07-27 DIAGNOSIS — Z00.00 ENCOUNTER FOR ROUTINE ADULT HEALTH EXAMINATION WITHOUT ABNORMAL FINDINGS: Primary | ICD-10-CM

## 2018-07-27 DIAGNOSIS — M25.512 ACUTE PAIN OF LEFT SHOULDER: ICD-10-CM

## 2018-07-27 DIAGNOSIS — E28.2 PCOS (POLYCYSTIC OVARIAN SYNDROME): ICD-10-CM

## 2018-07-27 DIAGNOSIS — E78.5 HYPERLIPIDEMIA WITH TARGET LDL LESS THAN 130: ICD-10-CM

## 2018-07-27 LAB
ALBUMIN SERPL-MCNC: 3.9 G/DL (ref 3.4–5)
ALP SERPL-CCNC: 63 U/L (ref 40–150)
ALT SERPL W P-5'-P-CCNC: 30 U/L (ref 0–50)
ANION GAP SERPL CALCULATED.3IONS-SCNC: 9 MMOL/L (ref 3–14)
AST SERPL W P-5'-P-CCNC: 23 U/L (ref 0–45)
BILIRUB SERPL-MCNC: 0.5 MG/DL (ref 0.2–1.3)
BUN SERPL-MCNC: 18 MG/DL (ref 7–30)
CALCIUM SERPL-MCNC: 8.7 MG/DL (ref 8.5–10.1)
CHLORIDE SERPL-SCNC: 107 MMOL/L (ref 94–109)
CHOLEST SERPL-MCNC: 199 MG/DL
CO2 SERPL-SCNC: 24 MMOL/L (ref 20–32)
CREAT SERPL-MCNC: 0.6 MG/DL (ref 0.52–1.04)
ERYTHROCYTE [DISTWIDTH] IN BLOOD BY AUTOMATED COUNT: 13.3 % (ref 10–15)
GFR SERPL CREATININE-BSD FRML MDRD: >90 ML/MIN/1.7M2
GLUCOSE SERPL-MCNC: 86 MG/DL (ref 70–99)
HBA1C MFR BLD: 5.2 % (ref 0–5.6)
HCT VFR BLD AUTO: 41.7 % (ref 35–47)
HDLC SERPL-MCNC: 93 MG/DL
HGB BLD-MCNC: 13.8 G/DL (ref 11.7–15.7)
LDLC SERPL CALC-MCNC: 93 MG/DL
MCH RBC QN AUTO: 29.1 PG (ref 26.5–33)
MCHC RBC AUTO-ENTMCNC: 33.1 G/DL (ref 31.5–36.5)
MCV RBC AUTO: 88 FL (ref 78–100)
NONHDLC SERPL-MCNC: 106 MG/DL
PLATELET # BLD AUTO: 237 10E9/L (ref 150–450)
POTASSIUM SERPL-SCNC: 4.7 MMOL/L (ref 3.4–5.3)
PROT SERPL-MCNC: 6.9 G/DL (ref 6.8–8.8)
RBC # BLD AUTO: 4.74 10E12/L (ref 3.8–5.2)
SODIUM SERPL-SCNC: 140 MMOL/L (ref 133–144)
TRIGL SERPL-MCNC: 64 MG/DL
TSH SERPL DL<=0.005 MIU/L-ACNC: 0.92 MU/L (ref 0.4–4)
WBC # BLD AUTO: 4.1 10E9/L (ref 4–11)

## 2018-07-27 PROCEDURE — 80061 LIPID PANEL: CPT | Performed by: FAMILY MEDICINE

## 2018-07-27 PROCEDURE — 36415 COLL VENOUS BLD VENIPUNCTURE: CPT | Performed by: FAMILY MEDICINE

## 2018-07-27 PROCEDURE — 82306 VITAMIN D 25 HYDROXY: CPT | Performed by: FAMILY MEDICINE

## 2018-07-27 PROCEDURE — 80053 COMPREHEN METABOLIC PANEL: CPT | Performed by: FAMILY MEDICINE

## 2018-07-27 PROCEDURE — 85027 COMPLETE CBC AUTOMATED: CPT | Performed by: FAMILY MEDICINE

## 2018-07-27 PROCEDURE — 99214 OFFICE O/P EST MOD 30 MIN: CPT | Performed by: FAMILY MEDICINE

## 2018-07-27 PROCEDURE — 84443 ASSAY THYROID STIM HORMONE: CPT | Performed by: FAMILY MEDICINE

## 2018-07-27 PROCEDURE — 83036 HEMOGLOBIN GLYCOSYLATED A1C: CPT | Performed by: FAMILY MEDICINE

## 2018-07-27 PROCEDURE — 87389 HIV-1 AG W/HIV-1&-2 AB AG IA: CPT | Performed by: FAMILY MEDICINE

## 2018-07-27 RX ORDER — METFORMIN HCL 500 MG
500 TABLET, EXTENDED RELEASE 24 HR ORAL
Qty: 90 TABLET | Refills: 3 | Status: SHIPPED | OUTPATIENT
Start: 2018-07-27 | End: 2019-07-05

## 2018-07-27 RX ORDER — SIMVASTATIN 20 MG
20 TABLET ORAL AT BEDTIME
Qty: 90 TABLET | Refills: 3 | Status: SHIPPED | OUTPATIENT
Start: 2018-07-27 | End: 2019-07-05

## 2018-07-27 RX ORDER — TERBINAFINE HYDROCHLORIDE 250 MG/1
250 TABLET ORAL DAILY
Qty: 90 TABLET | Refills: 0 | Status: SHIPPED | OUTPATIENT
Start: 2018-07-27 | End: 2018-10-26

## 2018-07-27 ASSESSMENT — PAIN SCALES - GENERAL: PAINLEVEL: NO PAIN (0)

## 2018-07-27 NOTE — PATIENT INSTRUCTIONS

## 2018-07-27 NOTE — PROGRESS NOTES
SUBJECTIVE:   CC: Brittany Chan is an 63 year old woman who presents for preventive health visit.     Physical   Annual:     Diet:  Regular (no restrictions)    Frequency of exercise:  None    Taking medications regularly:  Yes    Medication side effects:  None    Additional concerns today:  YES  History of Present Illness     Diet:  Regular (no restrictions)  Frequency of exercise:  None  Taking medications regularly:  Yes  Medication side effects:  None  Additional concerns today:  YES    Patient has been experiencing fullness after meals,belching and eructation,abdominal bloating,bilious reflux,symptoms primarily relate to meals, and lying down after meals for many months, gradual over time. ROS: patient denies abdominal pain or weight loss,dysphagia,black stools. Social history: alcohol intake is week-ends, nonsmoker, caffeine use is moderate-to-high daily, no ASA or NSAID's.    Was on wellbutrin for stress and low labido, is wanting to stop the med.     toenail fungus, wanting lamisil    Right shoulder, MRI yrs ago, torn rotator cuff, and hx of surgery 2014, recently  Very active and lifting grandkids and it is acting up. Naprosyn-max dose, 440, twice daily.  Seeing [facility], but not for awhile. Left knee acted up this spring, now better. Hx of right knee pain.      PROBLEMS TO ADD ON...    Today's PHQ-2 Score:   PHQ-2 ( 1999 [employer]) 7/27/2018   Q1: Little interest or pleasure in doing things 0   Q2: Feeling down, depressed or hopeless 0   PHQ-2 Score 0   Q1: Little interest or pleasure in doing things Not at all   Q2: Feeling down, depressed or hopeless Not at all   PHQ-2 Score 0       Abuse: Current or Past(Physical, Sexual or Emotional)- NO  Do you feel safe in your environment - YES    Social History   Substance Use Topics     Smoking status: Former Smoker     Quit date: 1/1/1980     Smokeless tobacco: Never Used     Alcohol use Yes      Comment: weekends     No flowsheet data found.No flowsheet data  "found.    Reviewed orders with patient.  Reviewed health maintenance and updated orders accordingly - Yes  Labs reviewed in EPIC  BP Readings from Last 3 Encounters:   07/27/18 120/86   06/02/17 122/84   09/26/16 122/80    Wt Readings from Last 3 Encounters:   07/27/18 163 lb 8 oz (74.2 kg)   08/05/16 165 lb (74.8 kg)   10/31/14 167 lb (75.8 kg)                    Patient over age 50, mutual decision to screen reflected in health maintenance.    Pertinent mammograms are reviewed under the imaging tab.  History of abnormal Pap smear: NO - age 30- 65 PAP every 3 years recommended  PAP / HPV Latest Ref Rng & Units 3/18/2016 1/19/2015 10/29/2013   PAP - NIL ASC-US(A) ASC-US(A)   HPV 16 DNA NEG Negative - -   HPV 18 DNA NEG Negative - -   OTHER HR HPV NEG Negative - -     Reviewed and updated as needed this visit by clinical staff  Tobacco  Allergies  Meds  Problems         Reviewed and updated as needed this visit by Provider            Review of Systems  CONSTITUTIONAL: NEGATIVE for fever, chills, change in weight  INTEGUMENTARY/SKIN: NEGATIVE for worrisome rashes, moles or lesions  EYES: NEGATIVE for vision changes or irritation  ENT: NEGATIVE for ear, mouth and throat problems  RESP: NEGATIVE for significant cough or SOB  BREAST: NEGATIVE for masses, tenderness or discharge  CV: NEGATIVE for chest pain, palpitations or peripheral edema  GI: NEGATIVE for nausea, abdominal pain, heartburn, or change in bowel habits  : NEGATIVE for unusual urinary or vaginal symptoms. No vaginal bleeding.  MUSCULOSKELETAL: NEGATIVE for significant arthralgias or myalgia  NEURO: NEGATIVE for weakness, dizziness or paresthesias  PSYCHIATRIC: NEGATIVE for changes in mood or affect      OBJECTIVE:   /86 (BP Location: Right arm, Patient Position: Chair, Cuff Size: Adult Regular)  Pulse 64  Temp 98.2  F (36.8  C) (Oral)  Resp 16  Ht 5' 4.75\" (1.645 m)  Wt 163 lb 8 oz (74.2 kg)  LMP 10/05/2012  BMI 27.42 kg/m2  Physical " Exam  GENERAL APPEARANCE: healthy, alert and no distress  EYES: Eyes grossly normal to inspection, PERRL and conjunctivae and sclerae normal  HENT: ear canals and TM's normal, nose and mouth without ulcers or lesions, oropharynx clear and oral mucous membranes moist  NECK: no adenopathy, no asymmetry, masses, or scars and thyroid normal to palpation  RESP: lungs clear to auscultation - no rales, rhonchi or wheezes  BREAST: normal without masses, tenderness or nipple discharge and no palpable axillary masses or adenopathy  CV: regular rate and rhythm, normal S1 S2, no S3 or S4, no murmur, click or rub, no peripheral edema and peripheral pulses strong  ABDOMEN: soft, nontender, no hepatosplenomegaly, no masses and bowel sounds normal   (female): normal female external genitalia, normal urethral meatus, vaginal mucosal atrophy noted, normal cervix, adnexae, and uterus without masses or abnormal discharge  MS: no musculoskeletal defects are noted and gait is age appropriate without ataxia  SKIN: no suspicious lesions or rashes  Toenails are thickened and deformed  NEURO: Normal strength and tone, sensory exam grossly normal, mentation intact and speech normal  PSYCH: mentation appears normal and affect normal/bright    Diagnostic Test Results:  none     ASSESSMENT/PLAN:   1. Encounter for routine adult health examination without abnormal findings  Normal exam    2. PCOS (polycystic ovarian syndrome)  Refilled med  - metFORMIN (GLUCOPHAGE-XR) 500 MG 24 hr tablet; Take 1 tablet (500 mg) by mouth daily (with dinner)  Dispense: 90 tablet; Refill: 3  - TSH with free T4 reflex  - Vitamin D Deficiency  - Hemoglobin A1c    3. Hyperlipidemia with target LDL less than 130  refilled  - simvastatin (ZOCOR) 20 MG tablet; Take 1 tablet (20 mg) by mouth At Bedtime at bedtime.  Dispense: 90 tablet; Refill: 3  - Lipid panel reflex to direct LDL Fasting  - Comprehensive metabolic panel  - CBC with platelets    4. Screening for HIV  "(human immunodeficiency virus)  Pt ok with screening  - HIV Screening    5. Toenail fungus  Potential medication side effects were discussed with the patient; let me know if any occur.    - terbinafine (LAMISIL) 250 MG tablet; Take 1 tablet (250 mg) by mouth daily  Dispense: 90 tablet; Refill: 0    6. Acute pain of left shoulder  Follow up with [facility] as planned    7. Encounter for screening mammogram for breast cancer    - MA Screen Bilateral w/Jose David; Future    COUNSELING:  Reviewed preventive health counseling, as reflected in patient instructions       Regular exercise       Healthy diet/nutrition    BP Readings from Last 1 Encounters:   07/27/18 120/86     Estimated body mass index is 27.42 kg/(m^2) as calculated from the following:    Height as of this encounter: 5' 4.75\" (1.645 m).    Weight as of this encounter: 163 lb 8 oz (74.2 kg).    BP Screening:   Last 3 BP Readings:    BP Readings from Last 3 Encounters:   07/27/18 120/86   06/02/17 122/84   09/26/16 122/80       The following was recommended to the patient:  Re-screen BP within a year and recommended lifestyle modifications  Weight management plan: Discussed healthy diet and exercise guidelines and patient will follow up in 12 months in clinic to re-evaluate.     reports that she quit smoking about 38 years ago. She has never used smokeless tobacco.      Counseling Resources:  ATP IV Guidelines  Pooled Cohorts Equation Calculator  Breast Cancer Risk Calculator  FRAX Risk Assessment  ICSI Preventive Guidelines  Dietary Guidelines for Americans, 2010  USDA's MyPlate  ASA Prophylaxis  Lung CA Screening    Sadaf Polanco MD  [facility]  "

## 2018-07-27 NOTE — MR AVS SNAPSHOT
After Visit Summary   7/27/2018    Brittany Chan    MRN: [id number]           Patient Information     Date Of Birth          1955        Visit Information        Provider Department      7/27/2018 8:20 AM Sadaf Polanco MD [facility]        Today's Diagnoses     Acute pain of left shoulder    -  1    PCOS (polycystic ovarian syndrome)        Hyperlipidemia with target LDL less than 130        Screening for HIV (human immunodeficiency virus)        Toenail fungus        Encounter for screening mammogram for breast cancer          Care Instructions      Preventive Health Recommendations  Female Ages 50 - 64    Yearly exam: See your health care provider every year in order to  o Review health changes.   o Discuss preventive care.    o Review your medicines if your doctor has prescribed any.      Get a Pap test every three years (unless you have an abnormal result and your provider advises testing more often).    If you get Pap tests with HPV test, you only need to test every 5 years, unless you have an abnormal result.     You do not need a Pap test if your uterus was removed (hysterectomy) and you have not had cancer.    You should be tested each year for STDs (sexually transmitted diseases) if you're at risk.     Have a mammogram every 1 to 2 years.    Have a colonoscopy at age 50, or have a yearly FIT test (stool test). These exams screen for colon cancer.      Have a cholesterol test every 5 years, or more often if advised.    Have a diabetes test (fasting glucose) every three years. If you are at risk for diabetes, you should have this test more often.     If you are at risk for osteoporosis (brittle bone disease), think about having a bone density scan (DEXA).    Shots: Get a flu shot each year. Get a tetanus shot every 10 years.    Nutrition:     Eat at least 5 servings of fruits and vegetables each day.    Eat whole-grain bread, whole-wheat pasta and brown rice  instead of white grains and rice.    Get adequate Calcium and Vitamin D.     Lifestyle    Exercise at least 150 minutes a week (30 minutes a day, 5 days a week). This will help you control your weight and prevent disease.    Limit alcohol to one drink per day.    No smoking.     Wear sunscreen to prevent skin cancer.     See your dentist every six months for an exam and cleaning.    See your eye doctor every 1 to 2 years.      Wean of prilosec   ok to stop wellbutrin, watching for any withdrawal sx, like dizzy spells          Follow-ups after your visit        Follow-up notes from your care team     Return in about 6 months (around 1/27/2019) for wellness exam.      Future tests that were ordered for you today     Open Future Orders        Priority Expected Expires Ordered    MA Screening Digital Bilateral Routine  7/27/2019 7/27/2018            Who to contact     If you have questions or need follow up information about today's clinic visit or your schedule please contact [facility] directly at [phone].  Normal or non-critical lab and imaging results will be communicated to you by [employer], letter or phone within 4 business days after the clinic has received the results. If you do not hear from us within 7 days, please contact the clinic through [facility] or phone. If you have a critical or abnormal lab result, we will notify you by phone as soon as possible.  Submit refill requests through [facility] or call your pharmacy and they will forward the refill request to us. Please allow 3 business days for your refill to be completed.          Additional Information About Your Visit        [facility] Information     [facility] gives you secure access to your electronic health record. If you see a primary care provider, you can also send messages to your care team and make appointments. If you have questions, please call your primary care clinic.  If you do not have a primary care provider, please call  "[phone] and they will assist you.        Care EveryWhere ID     This is your Care EveryWhere ID. This could be used by other organizations to access your [place] medical records  [id number]        Your Vitals Were     Pulse Temperature Respirations Height Last Period BMI (Body Mass Index)    64 98.2  F (36.8  C) (Oral) 16 5' 4.75\" (1.645 m) 10/05/2012 27.42 kg/m2       Blood Pressure from Last 3 Encounters:   07/27/18 120/86   06/02/17 122/84   09/26/16 122/80    Weight from Last 3 Encounters:   07/27/18 163 lb 8 oz (74.2 kg)   08/05/16 165 lb (74.8 kg)   10/31/14 167 lb (75.8 kg)              We Performed the Following     CBC with platelets     Comprehensive metabolic panel     Hemoglobin A1c     HIV Screening     Lipid panel reflex to direct LDL Fasting     TSH with free T4 reflex     Vitamin D Deficiency          Today's Medication Changes          These changes are accurate as of 7/27/18  9:06 AM.  If you have any questions, ask your nurse or doctor.               Start taking these medicines.        Dose/Directions    terbinafine 250 MG tablet   Commonly known as:  lamISIL   Used for:  Toenail fungus   Started by:  Sadaf Polanco MD        Dose:  250 mg   Take 1 tablet (250 mg) by mouth daily   Quantity:  90 tablet   Refills:  0            Where to get your medicines      These medications were sent to [facility] [address] [place] [address] AT [address]  [address] [facility] [address]     Phone:  [phone]     metFORMIN 500 MG 24 hr tablet    simvastatin 20 MG tablet    terbinafine 250 MG tablet                Primary Care Provider Office Phone # Fax #    Sadaf Polanco [phone]       [id number]  [address]   [facility] [address]        Equal Access to Services     BURT MARTINEZ AH: Jennifer phamo Sobg, waaxda luqadaha, qaybta kaalmada adeegyada, zach calloway ah. So [facility] " [phone].    ATENCIÓN: Si denis andrews, tiene a menon disposición servicios gratuitos de asistencia lingüística. Josue gutierrez [phone].    We comply with applicable federal civil rights laws and [place] laws. We do not discriminate on the basis of race, color, national origin, age, disability, sex, sexual orientation, or gender identity.            Thank you!     Thank you for choosing [facility]  for your care. Our goal is always to provide you with excellent care. Hearing back from our patients is one way we can continue to improve our services. Please take a few minutes to complete the written survey that you may receive in the mail after your visit with us. Thank you!             Your Updated Medication List - Protect others around you: Learn how to safely use, store and throw away your medicines at www.disposemymeds.org.          This list is accurate as of 7/27/18  9:06 AM.  Always use your most recent med list.                   Brand Name Dispense Instructions for use Diagnosis    buPROPion 150 MG 24 hr tablet    WELLBUTRIN XL    90 tablet    Take 1 tablet (150 mg) by mouth every morning    Other fatigue       Chondroitin Sulfate A 150 MG Caps      600 mg BID        ESTROVEN NIGHTTIME PO           ESTROVEN PO           melatonin 3 MG tablet      Take 3 mg by mouth nightly as needed for sleep        metFORMIN 500 MG 24 hr tablet    GLUCOPHAGE-XR    90 tablet    Take 1 tablet (500 mg) by mouth daily (with dinner)    PCOS (polycystic ovarian syndrome)       MULTIVITAMIN ADULT PO           naproxen sodium 220 MG capsule      Take 440 mg by mouth 2 times daily (with meals)        omeprazole 20 MG CR capsule    priLOSEC    90 capsule    Take 1 capsule by mouth daily.    Esophageal reflux       PROBIOTIC PO           RANITIDINE HCL PO      Take 150 mg by mouth        simvastatin 20 MG tablet    ZOCOR    90 tablet    Take 1 tablet (20 mg) by mouth At Bedtime at bedtime.    Hyperlipidemia with  target LDL less than 130       terbinafine 250 MG tablet    lamISIL    90 tablet    Take 1 tablet (250 mg) by mouth daily    Toenail fungus       vitamin D 1000 units capsule      Take 2 capsules by mouth daily.

## 2018-07-30 LAB
DEPRECATED CALCIDIOL+CALCIFEROL SERPL-MC: 54 UG/L (ref 20–75)
HIV 1+2 AB+HIV1 P24 AG SERPL QL IA: NONREACTIVE

## 2018-08-10 ENCOUNTER — HOSPITAL ENCOUNTER (OUTPATIENT)
Dept: MAMMOGRAPHY | Facility: CLINIC | Age: 63
Discharge: HOME | End: 2018-08-10
Attending: FAMILY MEDICINE | Admitting: FAMILY MEDICINE
Payer: COMMERCIAL

## 2018-08-10 DIAGNOSIS — Z12.31 ENCOUNTER FOR SCREENING MAMMOGRAM FOR BREAST CANCER: ICD-10-CM

## 2018-08-10 PROCEDURE — 77063 BREAST TOMOSYNTHESIS BI: CPT

## 2018-09-07 ENCOUNTER — ALLIED HEALTH/NURSE VISIT (OUTPATIENT)
Dept: NURSING | Facility: CLINIC | Age: 63
End: 2018-09-07
Payer: COMMERCIAL

## 2018-09-07 VITALS — DIASTOLIC BLOOD PRESSURE: 82 MMHG | SYSTOLIC BLOOD PRESSURE: 130 MMHG

## 2018-09-07 DIAGNOSIS — R03.0 ELEVATED BLOOD PRESSURE READING WITHOUT DIAGNOSIS OF HYPERTENSION: Primary | ICD-10-CM

## 2018-09-07 DIAGNOSIS — R53.83 OTHER FATIGUE: ICD-10-CM

## 2018-09-07 PROCEDURE — 99207 ZZC NO CHARGE NURSE ONLY: CPT

## 2018-09-07 NOTE — NURSING NOTE
"Chief Complaint   Patient presents with     Allied Health Visit     B/P check.      Initial /82 (BP Location: Right arm, Patient Position: Chair, Cuff Size: Adult Regular)  LMP 10/05/2012 Estimated body mass index is 27.42 kg/(m^2) as calculated from the following:    Height as of 7/27/18: 5' 4.75\" (1.645 m).    Weight as of 7/27/18: 163 lb 8 oz (74.2 kg).  BP completed using cuff size regular right arm    Lisa Magill, CMA    "

## 2018-09-07 NOTE — TELEPHONE ENCOUNTER
"buPROPion (WELLBUTRIN XL) 150 MG 24 hr tablet  Last Written Prescription Date:  06/19/18  Last Fill Quantity: 90,  # refills: 0   Last office visit: 7/27/2018 with prescribing provider:  Collin     Future Office Visit:      Requested Prescriptions   Pending Prescriptions Disp Refills     buPROPion (WELLBUTRIN XL) 150 MG 24 hr tablet 90 tablet 0     Sig: Take 1 tablet (150 mg) by mouth every morning    SSRIs Protocol Passed    9/7/2018  8:22 AM       Passed - Recent (12 mo) or future (30 days) visit within the authorizing provider's specialty    Patient had office visit in the last 12 months or has a visit in the next 30 days with authorizing provider or within the authorizing provider's specialty.  See \"Patient Info\" tab in inbasket, or \"Choose Columns\" in Meds & Orders section of the refill encounter.           Passed - Medication is Bupropion    If the medication is Bupropion (Wellbutrin), and the patient is taking for smoking cessation; OK to refill.         Passed - Patient is age 18 or older       Passed - No active pregnancy on record       Passed - No positive pregnancy test in last 12 months          "

## 2018-09-07 NOTE — PROGRESS NOTES
Brittany Chan is a 63 year old patient who comes in today for a Blood Pressure check.  Initial BP:  /82 (BP Location: Right arm, Patient Position: Chair, Cuff Size: Adult Regular)  LMP 10/05/2012       Disposition: results routed to provider    Lisa Magill, CMA

## 2018-09-11 RX ORDER — BUPROPION HYDROCHLORIDE 150 MG/1
150 TABLET ORAL EVERY MORNING
Qty: 90 TABLET | Refills: 1 | Status: SHIPPED | OUTPATIENT
Start: 2018-09-11 | End: 2018-10-01

## 2018-09-11 NOTE — TELEPHONE ENCOUNTER
"Routing refill request to provider for review/approval because:  Drug not on the [facility] refill protocol for dx of \"other fatigue\"  Lou Carmichael RN, BSN          "

## 2018-09-29 ENCOUNTER — MYC MEDICAL ADVICE (OUTPATIENT)
Dept: FAMILY MEDICINE | Facility: CLINIC | Age: 63
End: 2018-09-29

## 2018-09-29 DIAGNOSIS — R53.83 OTHER FATIGUE: ICD-10-CM

## 2018-10-01 RX ORDER — BUPROPION HYDROCHLORIDE 150 MG/1
150 TABLET ORAL EVERY MORNING
Qty: 90 TABLET | Refills: 1 | Status: SHIPPED | OUTPATIENT
Start: 2018-10-01 | End: 2019-04-02

## 2018-10-26 DIAGNOSIS — B35.1 TOENAIL FUNGUS: ICD-10-CM

## 2018-10-26 NOTE — TELEPHONE ENCOUNTER
Requested Prescriptions   Pending Prescriptions Disp Refills     terbinafine (LAMISIL) 250 MG tablet 90 tablet 0     Sig: Take 1 tablet (250 mg) by mouth daily    There is no refill protocol information for this order        Last Written Prescription Date:  7/27/18  Last Fill Quantity: 90,  # refills: 0   Last Office Visit: 7/27/2018 Braden       Return in about 6 months (around 1/27/2019) for wellness exam.     Future Office Visit:

## 2018-10-30 RX ORDER — TERBINAFINE HYDROCHLORIDE 250 MG/1
250 TABLET ORAL DAILY
Qty: 90 TABLET | Refills: 0 | Status: SHIPPED | OUTPATIENT
Start: 2018-10-30 | End: 2018-11-15

## 2018-10-30 NOTE — TELEPHONE ENCOUNTER
Routing refill request to provider for review/approval because:  Drug not on the [facility] refill protocol   Lindsay Mccrary RN

## 2018-11-15 ENCOUNTER — MYC MEDICAL ADVICE (OUTPATIENT)
Dept: FAMILY MEDICINE | Facility: CLINIC | Age: 63
End: 2018-11-15

## 2018-11-15 DIAGNOSIS — B35.1 TOENAIL FUNGUS: ICD-10-CM

## 2018-11-15 RX ORDER — TERBINAFINE HYDROCHLORIDE 250 MG/1
250 TABLET ORAL DAILY
Qty: 90 TABLET | Refills: 0 | Status: SHIPPED | OUTPATIENT
Start: 2018-11-15 | End: 2018-11-27

## 2018-11-27 ENCOUNTER — MYC MEDICAL ADVICE (OUTPATIENT)
Dept: FAMILY MEDICINE | Facility: CLINIC | Age: 63
End: 2018-11-27

## 2018-11-27 DIAGNOSIS — B35.1 TOENAIL FUNGUS: ICD-10-CM

## 2018-11-27 RX ORDER — TERBINAFINE HYDROCHLORIDE 250 MG/1
250 TABLET ORAL DAILY
Qty: 90 TABLET | Refills: 0 | Status: SHIPPED | OUTPATIENT
Start: 2018-11-27 | End: 2019-07-05

## 2018-11-30 ENCOUNTER — TELEPHONE (OUTPATIENT)
Dept: FAMILY MEDICINE | Facility: CLINIC | Age: 63
End: 2018-11-30

## 2018-11-30 NOTE — TELEPHONE ENCOUNTER
Central Prior Authorization Team   Phone: [phone]    PA Initiation    Medication: terbinafine (LAMISIL) 250 MG tablet  Insurance Company: [employer] - Phone [phone] Fax [phone]  Pharmacy Filling the Rx: [facility] [id number] [place] [address] [address]  Filling Pharmacy Phone: [phone]  Filling Pharmacy Fax:    Start Date: 11/30/2018

## 2018-11-30 NOTE — TELEPHONE ENCOUNTER
Prior Authorization Retail Medication Request    Medication/Dose: terbinafine (LAMISIL) 250 MG tablet  ICD code (if different than what is on RX):    Previously Tried and Failed:  None  Rationale:  Pt has been using this for 2 years and has been effective.  Pt needs to take Lamisil daily for daily symptoms.    Insurance Name:  Unknown  Insurance ID:  [id number]      Pharmacy Information (if different than what is on RX)  Name:    Phone:

## 2018-12-04 NOTE — TELEPHONE ENCOUNTER
Prior Authorization Approval    Authorization Effective Date: 12/3/2018  Authorization Expiration Date: 12/3/2019  Medication: terbinafine (LAMISIL) 250 MG tablet  Approved Dose/Quantity:    Reference #:     Insurance Company: [employer] - Phone [phone] Fax [phone]  Expected CoPay:       CoPay Card Available:      Foundation Assistance Needed:    Which Pharmacy is filling the prescription (Not needed for infusion/clinic administered): [facility] [address] [place] [id number] SEC OF [facility] [address]  Pharmacy Notified: Yes  Patient Notified: Yes

## 2019-04-01 DIAGNOSIS — R53.83 OTHER FATIGUE: ICD-10-CM

## 2019-04-01 NOTE — TELEPHONE ENCOUNTER
"Requested Prescriptions   Pending Prescriptions Disp Refills     buPROPion (WELLBUTRIN XL) 150 MG 24 hr tablet 90 tablet 1    Last Written Prescription Date:  10/1/18  Last Fill Quantity: 90,  # refills: 1   Last Office Visit: 7/27/2018 Collin      Return in about 6 months (around 1/27/2019) for wellness exam.     Future Office Visit:      Sig: Take 1 tablet (150 mg) by mouth every morning    SSRIs Protocol Passed - 4/1/2019 10:55 AM   PHQ-9 SCORE 7/21/2016   PHQ-9 Total Score 0     LAY-7 SCORE 7/21/2016   Total Score 0         Passed - Recent (12 mo) or future (30 days) visit within the authorizing provider's specialty    Patient had office visit in the last 12 months or has a visit in the next 30 days with authorizing provider or within the authorizing provider's specialty.  See \"Patient Info\" tab in inbasket, or \"Choose Columns\" in Meds & Orders section of the refill encounter.             Passed - Medication is Bupropion    If the medication is Bupropion (Wellbutrin), and the patient is taking for smoking cessation; OK to refill.         Passed - Medication is active on med list       Passed - Patient is age 18 or older       Passed - No active pregnancy on record       Passed - No positive pregnancy test in last 12 months        "

## 2019-04-02 RX ORDER — BUPROPION HYDROCHLORIDE 150 MG/1
150 TABLET ORAL EVERY MORNING
Qty: 90 TABLET | Refills: 1 | Status: SHIPPED | OUTPATIENT
Start: 2019-04-02 | End: 2019-07-05

## 2019-04-02 NOTE — TELEPHONE ENCOUNTER
Please advise on refill. Fatigue not on RN refill protocol for diagnosis.     Giovanna Flynn RN -- [facility]

## 2019-07-05 ENCOUNTER — OFFICE VISIT (OUTPATIENT)
Dept: FAMILY MEDICINE | Facility: CLINIC | Age: 64
End: 2019-07-05
Payer: COMMERCIAL

## 2019-07-05 VITALS
SYSTOLIC BLOOD PRESSURE: 128 MMHG | DIASTOLIC BLOOD PRESSURE: 84 MMHG | HEART RATE: 76 BPM | TEMPERATURE: 98 F | RESPIRATION RATE: 20 BRPM

## 2019-07-05 DIAGNOSIS — E78.5 HYPERLIPIDEMIA WITH TARGET LDL LESS THAN 130: ICD-10-CM

## 2019-07-05 DIAGNOSIS — E28.2 PCOS (POLYCYSTIC OVARIAN SYNDROME): ICD-10-CM

## 2019-07-05 DIAGNOSIS — R03.0 ELEVATED BLOOD PRESSURE READING WITHOUT DIAGNOSIS OF HYPERTENSION: ICD-10-CM

## 2019-07-05 DIAGNOSIS — K14.6 BURNING TONGUE: ICD-10-CM

## 2019-07-05 DIAGNOSIS — K21.9 GASTROESOPHAGEAL REFLUX DISEASE, ESOPHAGITIS PRESENCE NOT SPECIFIED: Primary | ICD-10-CM

## 2019-07-05 DIAGNOSIS — R53.83 OTHER FATIGUE: ICD-10-CM

## 2019-07-05 PROCEDURE — 99214 OFFICE O/P EST MOD 30 MIN: CPT | Performed by: FAMILY MEDICINE

## 2019-07-05 RX ORDER — LISINOPRIL 10 MG/1
10 TABLET ORAL DAILY
Qty: 30 TABLET | Refills: 3 | Status: SHIPPED | OUTPATIENT
Start: 2019-07-05 | End: 2019-07-16 | Stop reason: SINTOL

## 2019-07-05 RX ORDER — METFORMIN HCL 500 MG
500 TABLET, EXTENDED RELEASE 24 HR ORAL
Qty: 90 TABLET | Refills: 3 | Status: SHIPPED | OUTPATIENT
Start: 2019-07-05 | End: 2020-07-22

## 2019-07-05 RX ORDER — SIMVASTATIN 20 MG
20 TABLET ORAL AT BEDTIME
Qty: 90 TABLET | Refills: 3 | Status: SHIPPED | OUTPATIENT
Start: 2019-07-05 | End: 2020-07-22

## 2019-07-05 RX ORDER — BUPROPION HYDROCHLORIDE 150 MG/1
150 TABLET ORAL EVERY MORNING
Qty: 90 TABLET | Refills: 1 | Status: SHIPPED | OUTPATIENT
Start: 2019-07-05 | End: 2020-03-23

## 2019-07-05 RX ORDER — NORTRIPTYLINE HCL 10 MG
10 CAPSULE ORAL AT BEDTIME
Qty: 30 CAPSULE | Refills: 3 | Status: SHIPPED | OUTPATIENT
Start: 2019-07-05 | End: 2019-10-24

## 2019-07-05 NOTE — PROGRESS NOTES
Subjective     Brittany Chan is a 64 year old female who presents to clinic today for the following health issues:    HPI   Concern - bad breath and odor   Onset: April     Description:   Bad breath and odor(metalic breath) and hyper-emesis-due to post nasal drip, which is common for her, then she started Claritin to help with this, generic.     Intensity: moderate plus     Progression of Symptoms:  same    Accompanying Signs & Symptoms:  Tingling sensation and ridges     Previous history of similar problem:   No     Precipitating factors:   Worsened by: used scope one time, and stung and foamed.      Alleviating factors:  Improved by: nothing     Therapies Tried and outcome: mouth wash BID. Claritin.  Went to Dentist.  Stopped taking chondroitin.     Taking Prilosec daily, zantac at night. Does drink ETOH 1-2 per day, but mostly on weekends and when her     PROBLEMS TO ADD ON...    Recent Labs   Lab Test 07/27/18  0908 06/02/17  1029 03/04/16  0811 01/19/15  1005   A1C 5.2 5.4 5.6  --    LDL 93 97 94  --    HDL 93 92 79  --    TRIG 64 61 93  --    ALT 30 25 35 28   CR 0.60 0.66 0.67 0.53   GFRESTIMATED >90 >90  Non  GFR Calc   90 >90  Non  GFR Calc     GFRESTBLACK >90 >90   GFR Calc   >90   GFR Calc   >90   GFR Calc     POTASSIUM 4.7 4.1 4.1 4.4   TSH 0.92  --   --  0.75      BP Readings from Last 3 Encounters:   07/05/19 128/84   09/07/18 130/82   07/27/18 120/86    Wt Readings from Last 3 Encounters:   07/27/18 74.2 kg (163 lb 8 oz)   08/05/16 74.8 kg (165 lb)   10/31/14 75.8 kg (167 lb)                    PROBLEMS TO ADD ON...  Reviewed and updated as needed this visit by Provider         Review of Systems   ROS COMP: CONSTITUTIONAL: NEGATIVE for fever, chills, change in weight  ENT/MOUTH: NEGATIVE for ear, mouth and throat problems  RESP: NEGATIVE for significant cough or SOB  CV: NEGATIVE for chest pain, palpitations or  peripheral edema      Objective    /84 (BP Location: Right arm, Patient Position: Sitting, Cuff Size: Adult Regular)   Pulse 76   Temp 98  F (36.7  C) (Oral)   Resp 20   LMP 10/05/2012   There is no height or weight on file to calculate BMI.  Physical Exam   GENERAL: healthy, alert and no distress  EYES: Eyes grossly normal to inspection, PERRL and conjunctivae and sclerae normal  HENT: ear canals and TM's normal, nose and mouth without ulcers or lesions  NECK: no adenopathy, no asymmetry, masses, or scars and thyroid normal to palpation  RESP: lungs clear to auscultation - no rales, rhonchi or wheezes  CV: regular rate and rhythm, normal S1 S2, no S3 or S4, no murmur, click or rub, no peripheral edema and peripheral pulses strong  ABDOMEN: soft, nontender, no hepatosplenomegaly, no masses and bowel sounds normal  MS: no gross musculoskeletal defects noted, no edema    Diagnostic Test Results:  Labs reviewed in Epic        Assessment & Plan     1. Other fatigue  Will start med, recheck in 1 month, phone appt ok  - buPROPion (WELLBUTRIN XL) 150 MG 24 hr tablet; Take 1 tablet (150 mg) by mouth every morning  Dispense: 90 tablet; Refill: 1    2. PCOS (polycystic ovarian syndrome)  Cont same, working well  - metFORMIN (GLUCOPHAGE-XR) 500 MG 24 hr tablet; Take 1 tablet (500 mg) by mouth daily (with dinner)  Dispense: 90 tablet; Refill: 3    3. Gastroesophageal reflux disease, esophagitis presence not specified  Fair control, discussed decreasing or stopping ETOH and caffeine, pt will try, once mother leaves  - omeprazole (PRILOSEC) 20 MG DR capsule; Take 1 capsule (20 mg) by mouth daily  Dispense: 90 capsule; Refill: 3    4. Hyperlipidemia with target LDL less than 130  refilled  - simvastatin (ZOCOR) 20 MG tablet; Take 1 tablet (20 mg) by mouth At Bedtime at bedtime.  Dispense: 90 tablet; Refill: 3    5. Burning tongue  New issue, will try pamelor  - nortriptyline (PAMELOR) 10 MG capsule; Take 1 capsule (10  mg) by mouth At Bedtime  Dispense: 30 capsule; Refill: 3    6. Elevated blood pressure reading without diagnosis of hypertension  refilled  - lisinopril (PRINIVIL/ZESTRIL) 10 MG tablet; Take 1 tablet (10 mg) by mouth daily  Dispense: 30 tablet; Refill: 3       Work on weight loss  Regular exercise    No follow-ups on file.    Sadaf Polanco MD  [facility]

## 2019-07-05 NOTE — NURSING NOTE
"Chief Complaint   Patient presents with     Mouth Problem     Initial /84 (BP Location: Right arm, Patient Position: Sitting, Cuff Size: Adult Regular)   Pulse 76   Temp 98  F (36.7  C) (Oral)   Resp 20   LMP 10/05/2012  Estimated body mass index is 27.42 kg/m  as calculated from the following:    Height as of 7/27/18: 1.645 m (5' 4.75\").    Weight as of 7/27/18: 74.2 kg (163 lb 8 oz).  BP completed using cuff size regular right arm    Lisa Magill, CMA    "

## 2019-08-07 DIAGNOSIS — R03.0 ELEVATED BLOOD PRESSURE READING WITHOUT DIAGNOSIS OF HYPERTENSION: ICD-10-CM

## 2019-08-07 NOTE — TELEPHONE ENCOUNTER
"Message from Pharm- Insurance is [employer], they require a new RX.    Requested Prescriptions   Pending Prescriptions Disp Refills     hydrochlorothiazide (MICROZIDE) 12.5 MG capsule 90 capsule 0     Sig: Take 1 capsule (12.5 mg) by mouth daily   Last Written Prescription Date:  7/16/19  Last Fill Quantity: 90,  # refills: 0   Last Office Visit: 7/5/2019   Future Office Visit:    Next 5 appointments (look out 90 days)    Aug 15, 2019  7:20 AM CDT  PHYSICAL with Sadaf Polanco MD  [facility] [id number] [place] [address], [address]  [facility] [address] [phone]             Diuretics (Including Combos) Protocol Failed - 8/7/2019  9:38 AM        Failed - Normal serum creatinine on file in past 12 months     Recent Labs   Lab Test 07/27/18  0908   CR 0.60              Failed - Normal serum potassium on file in past 12 months     Recent Labs   Lab Test 07/27/18  0908   POTASSIUM 4.7                    Failed - Normal serum sodium on file in past 12 months     Recent Labs   Lab Test 07/27/18  0908                 Passed - Blood pressure under 140/90 in past 12 months     BP Readings from Last 3 Encounters:   07/05/19 128/84   09/07/18 130/82   07/27/18 120/86                 Passed - Recent (12 mo) or future (30 days) visit within the authorizing provider's specialty     Patient had office visit in the last 12 months or has a visit in the next 30 days with authorizing provider or within the authorizing provider's specialty.  See \"Patient Info\" tab in inbasket, or \"Choose Columns\" in Meds & Orders section of the refill encounter.              Passed - Medication is active on med list        Passed - Patient is age 18 or older        Passed - No active pregancy on record        Passed - No positive pregnancy test in past 12 months          "

## 2019-08-08 RX ORDER — HYDROCHLOROTHIAZIDE 12.5 MG/1
12.5 CAPSULE ORAL DAILY
Qty: 90 CAPSULE | Refills: 0 | OUTPATIENT
Start: 2019-08-08

## 2019-08-08 NOTE — TELEPHONE ENCOUNTER
Spoke with pharmacy and they state to disregard this request as pt has refills and she is on a program where they are aligning her refills    Lou Carmichael RN, BSN

## 2019-08-09 NOTE — TELEPHONE ENCOUNTER
Pt returned call  Pt just trying to get all refills to fill at the same time  Insurance only allows 30 day fills    90 day fill sent in July, has refill so no action needed at this time    Brandy Vickers RN, BS  Clinical Nurse Triage.

## 2019-08-15 ENCOUNTER — OFFICE VISIT (OUTPATIENT)
Dept: FAMILY MEDICINE | Facility: CLINIC | Age: 64
End: 2019-08-15
Payer: COMMERCIAL

## 2019-08-15 VITALS
DIASTOLIC BLOOD PRESSURE: 80 MMHG | TEMPERATURE: 98.1 F | WEIGHT: 161.6 LBS | HEART RATE: 72 BPM | RESPIRATION RATE: 16 BRPM | HEIGHT: 65 IN | SYSTOLIC BLOOD PRESSURE: 110 MMHG | BODY MASS INDEX: 26.92 KG/M2

## 2019-08-15 DIAGNOSIS — E28.2 PCOS (POLYCYSTIC OVARIAN SYNDROME): ICD-10-CM

## 2019-08-15 DIAGNOSIS — Z00.00 ROUTINE GENERAL MEDICAL EXAMINATION AT A HEALTH CARE FACILITY: Primary | ICD-10-CM

## 2019-08-15 DIAGNOSIS — Z12.11 SCREEN FOR COLON CANCER: ICD-10-CM

## 2019-08-15 DIAGNOSIS — K14.6 BURNING TONGUE: ICD-10-CM

## 2019-08-15 DIAGNOSIS — E78.5 HYPERLIPIDEMIA WITH TARGET LDL LESS THAN 130: ICD-10-CM

## 2019-08-15 DIAGNOSIS — L03.115 CELLULITIS OF RIGHT LOWER EXTREMITY: ICD-10-CM

## 2019-08-15 DIAGNOSIS — N81.89 WEAKNESS OF PELVIC FLOOR: ICD-10-CM

## 2019-08-15 LAB
ERYTHROCYTE [DISTWIDTH] IN BLOOD BY AUTOMATED COUNT: 13.2 % (ref 10–15)
HBA1C MFR BLD: 5.3 % (ref 0–5.6)
HCT VFR BLD AUTO: 42.4 % (ref 35–47)
HGB BLD-MCNC: 13.5 G/DL (ref 11.7–15.7)
MCH RBC QN AUTO: 28.2 PG (ref 26.5–33)
MCHC RBC AUTO-ENTMCNC: 31.8 G/DL (ref 31.5–36.5)
MCV RBC AUTO: 89 FL (ref 78–100)
PLATELET # BLD AUTO: 246 10E9/L (ref 150–450)
RBC # BLD AUTO: 4.78 10E12/L (ref 3.8–5.2)
WBC # BLD AUTO: 3.5 10E9/L (ref 4–11)

## 2019-08-15 PROCEDURE — 84443 ASSAY THYROID STIM HORMONE: CPT | Performed by: FAMILY MEDICINE

## 2019-08-15 PROCEDURE — 99396 PREV VISIT EST AGE 40-64: CPT | Performed by: FAMILY MEDICINE

## 2019-08-15 PROCEDURE — 36415 COLL VENOUS BLD VENIPUNCTURE: CPT | Performed by: FAMILY MEDICINE

## 2019-08-15 PROCEDURE — 80053 COMPREHEN METABOLIC PANEL: CPT | Performed by: FAMILY MEDICINE

## 2019-08-15 PROCEDURE — 80061 LIPID PANEL: CPT | Performed by: FAMILY MEDICINE

## 2019-08-15 PROCEDURE — 82306 VITAMIN D 25 HYDROXY: CPT | Performed by: FAMILY MEDICINE

## 2019-08-15 PROCEDURE — 85027 COMPLETE CBC AUTOMATED: CPT | Performed by: FAMILY MEDICINE

## 2019-08-15 PROCEDURE — 83036 HEMOGLOBIN GLYCOSYLATED A1C: CPT | Performed by: FAMILY MEDICINE

## 2019-08-15 PROCEDURE — G0145 SCR C/V CYTO,THINLAYER,RESCR: HCPCS | Performed by: FAMILY MEDICINE

## 2019-08-15 PROCEDURE — 87624 HPV HI-RISK TYP POOLED RSLT: CPT | Performed by: FAMILY MEDICINE

## 2019-08-15 RX ORDER — CEPHALEXIN 500 MG/1
500 CAPSULE ORAL 2 TIMES DAILY
Qty: 20 CAPSULE | Refills: 0 | Status: SHIPPED | OUTPATIENT
Start: 2019-08-15 | End: 2019-08-25

## 2019-08-15 ASSESSMENT — ENCOUNTER SYMPTOMS
SORE THROAT: 0
DIARRHEA: 0
MYALGIAS: 0
NAUSEA: 1
EYE PAIN: 0
FEVER: 0
HEMATOCHEZIA: 0
SHORTNESS OF BREATH: 0
ARTHRALGIAS: 0
HEADACHES: 0
CONSTIPATION: 0
WEAKNESS: 0
DYSURIA: 0
BREAST MASS: 0
PALPITATIONS: 0
JOINT SWELLING: 0
DIZZINESS: 0
COUGH: 0
FREQUENCY: 0
PARESTHESIAS: 0
NERVOUS/ANXIOUS: 0
ABDOMINAL PAIN: 0
CHILLS: 0
HEMATURIA: 0
HEARTBURN: 0

## 2019-08-15 ASSESSMENT — MIFFLIN-ST. JEOR: SCORE: 1289.92

## 2019-08-15 ASSESSMENT — PATIENT HEALTH QUESTIONNAIRE - PHQ9: SUM OF ALL RESPONSES TO PHQ QUESTIONS 1-9: 0

## 2019-08-15 NOTE — PATIENT INSTRUCTIONS
Preventive Health Recommendations  Female Ages 50 - 64    Yearly exam: See your health care provider every year in order to  o Review health changes.   o Discuss preventive care.    o Review your medicines if your doctor has prescribed any.      Get a Pap test every three years (unless you have an abnormal result and your provider advises testing more often).    If you get Pap tests with HPV test, you only need to test every 5 years, unless you have an abnormal result.     You do not need a Pap test if your uterus was removed (hysterectomy) and you have not had cancer.    You should be tested each year for STDs (sexually transmitted diseases) if you're at risk.     Have a mammogram every 1 to 2 years.    Have a colonoscopy at age 50, or have a yearly FIT test (stool test). These exams screen for colon cancer.      Have a cholesterol test every 5 years, or more often if advised.    Have a diabetes test (fasting glucose) every three years. If you are at risk for diabetes, you should have this test more often.     If you are at risk for osteoporosis (brittle bone disease), think about having a bone density scan (DEXA).    Shots: Get a flu shot each year. Get a tetanus shot every 10 years.    Nutrition:     Eat at least 5 servings of fruits and vegetables each day.    Eat whole-grain bread, whole-wheat pasta and brown rice instead of white grains and rice.    Get adequate Calcium and Vitamin D.     Lifestyle    Exercise at least 150 minutes a week (30 minutes a day, 5 days a week). This will help you control your weight and prevent disease.    Limit alcohol to one drink per day.    No smoking.     Wear sunscreen to prevent skin cancer.     See your dentist every six months for an exam and cleaning.    See your eye doctor every 1 to 2 years.     MD Notification    Notified Person: MD    Notified Person Name: Dr. Sage    Notification Date/Time:  1/3 at 0025    Notification Interaction: Page    Purpose of Notification: Received PRN neb, minimal improvements. Possible fluid overload    Orders Received: PRN albuterol orders placed    Comments: discontinue fluid. Chest x ray ordered

## 2019-08-15 NOTE — NURSING NOTE
"Chief Complaint   Patient presents with     Physical     Physical and Pap     Refill Request     Blood Draw     LABS.  Patient is fasting     Initial /80 (BP Location: Right arm, Patient Position: Sitting, Cuff Size: Adult Regular)   Pulse 72   Temp 98.1  F (36.7  C) (Oral)   Resp 16   Ht 1.645 m (5' 4.75\")   Wt 73.3 kg (161 lb 9.6 oz)   LMP 10/05/2012   BMI 27.10 kg/m   Estimated body mass index is 27.1 kg/m  as calculated from the following:    Height as of this encounter: 1.645 m (5' 4.75\").    Weight as of this encounter: 73.3 kg (161 lb 9.6 oz).  BP completed using cuff size regular right arm    Lisa Magill, CMA    "

## 2019-08-15 NOTE — PROGRESS NOTES
"   SUBJECTIVE:   CC: Brittany Chan is an 64 year old woman who presents for preventive health visit.     Healthy Habits:     Getting at least 3 servings of Calcium per day:  Yes    Bi-annual eye exam:  Yes    Dental care twice a year:  Yes    Sleep apnea or symptoms of sleep apnea:  None    Diet:  Regular (no restrictions), Low salt and Carbohydrate counting    Frequency of exercise:  1 day/week    Duration of exercise:  15-30 minutes    Taking medications regularly:  Yes    Medication side effects:  None    PHQ-2 Total Score: 0    Additional concerns today:  Yes    GERD/Heartburn  Onset: ongoing issue    Description:     Burning in chest: no    Intensity: NO PAIN    Progression of Symptoms: improving    Accompanying Signs & Symptoms:  Does it feel like food gets stuck: no  Nausea: YES  Vomiting (bloody?): no  Abdominal Pain: YES  Black-Tarry stools: no:  Bloody stools: no    History:   Previous ulcers: no    Precipitating factors:   Caffeine use: YES- 1-2 CUPS A DAY   Alcohol use: YES- REALLY CUT BACK   NSAID/Aspirin use: no  Tobacco use: no  Worse with no particular food or drink.    Alleviating factors:  NOTHING     Therapies Tried and outcome:claritin and prilosec and ranitidine.     Patient is very sensitive to vomiting and has to be very careful, she cannot even wear turtle necks. Patient reports that she does not burp, she just retches. Symptoms flare up between 2-6 pm. She has 1 cup of espresso in the mornings. She has tried going to only tea years ago but noticed no improvement with this. She has now greatly reduced her alcohol consumption but still noticed little improvement.     She is now taking omeprazole 20 mg twice daily and ranitidine 150 mg once daily, which has helped but the \"noise\" she makes when retching is still very bothersome for her, particularly when she is at work. She would like to make it stop if possible.     Reviewed that patient had an upper GI endoscopy on 8/5/2016, which was " normal. She was recommended taking omeprazole 20 mg daily for life. Patient notes that she has a cousin with esophageal cancer, but this cousin also had a hx of bulimia.       Concern - spot on right lower leg   Onset: since end of July after shaving legs     Description:   Sore on right lower leg     Intensity: mild, 1/10    Progression of Symptoms:  worsening    Accompanying Signs & Symptoms:  More redness and tenderness     Previous history of similar problem: no    Precipitating factors:   Worsened by: nothing     Alleviating factors:  Improved by: steptic     Therapies Tried and outcome: steptic and triple antibiotic ointment.     Thinks she cut herself after shaving and that the area got infected. The skin around the lesion is red.    Urine incontinence  Patient has a long hx of stress incontinence, worst at times. She has been doing kegel exercises for many years but has noticed little improvement.     Burning tongue  Started taking nortriptyline 10 mg on 7/5/2019. Reports that it has provided 50-75% improvement of the burning tongue.     Allergies  She discontinued Claritin and she still has intermittent post nasal drip. She is not using a nasal spray. She plans on resuming meds in the fall again.     Hypertension    BP Readings from Last 3 Encounters:   08/15/19 110/80   07/05/19 128/84   09/07/18 130/82     Patient has been monitoring her BP at home which has been good. She is taking hydrochlorothiazide 12.5 mg daily.     Hyperlipidemia    Recent Labs   Lab Test 07/27/18  0908 06/02/17  1029  10/31/14  0846 10/29/13  0825   CHOL 199 201*   < > 180 180   HDL 93 92   < > 79 81   LDL 93 97   < > 87 86   TRIG 64 61   < > 68 69   CHOLHDLRATIO  --   --   --  2.3 2.2    < > = values in this interval not displayed.     Comments that she feels as if she is on too many medications and would like to discontinue any meds if possible. She would like to trial discontinuing her simvastatin if possible. She eats a low fat  breakfast and lunch but for dinner she does admit to eating red meat often. She also eats at [employer] and [employer] frequently. She likes to eat a lot of cheese.     Anxiety  She does feel anxious with the recent turn of events with the stock market and thinking about her detention.     Other problems to add on...  -Both of her children will be moving out of the country.   -She will be going to [place] next month.         Today's PHQ-2 Score:   PHQ-2 (  [employer]) 8/15/2019   Q1: Little interest or pleasure in doing things 0   Q2: Feeling down, depressed or hopeless 0   PHQ-2 Score 0   Q1: Little interest or pleasure in doing things Not at all   Q2: Feeling down, depressed or hopeless Not at all   PHQ-2 Score 0     Abuse: Current or Past(Physical, Sexual or Emotional)- NO  Do you feel safe in your environment? YES    Social History     Tobacco Use     Smoking status: Former Smoker     Packs/day: 0.00     Years: 0.00     Pack years: 0.00     Last attempt to quit: 1980     Years since quittin.6     Smokeless tobacco: Never Used   Substance Use Topics     Alcohol use: Yes     Comment: weekends     If you drink alcohol do you typically have >3 drinks per day or >7 drinks per week? No    Alcohol Use 8/15/2019   Prescreen: >3 drinks/day or >7 drinks/week? No   Prescreen: >3 drinks/day or >7 drinks/week? -   No flowsheet data found.    Reviewed orders with patient.  Reviewed health maintenance and updated orders accordingly - Yes  Labs reviewed in EPIC  BP Readings from Last 3 Encounters:   08/15/19 110/80   19 128/84   18 130/82    Wt Readings from Last 3 Encounters:   08/15/19 73.3 kg (161 lb 9.6 oz)   18 74.2 kg (163 lb 8 oz)   16 74.8 kg (165 lb)          Recent Labs   Lab Test 18  0908 17  1029 16  0811 01/19/15  1005   A1C 5.2 5.4 5.6  --    LDL 93 97 94  --    HDL 93 92 79  --    TRIG 64 61 93  --    ALT 30 25 35 28   CR 0.60 0.66 0.67 0.53   GFRESTIMATED  >90 >90  Non  GFR Calc   90 >90  Non  GFR Calc     GFRESTBLACK >90 >90   GFR Calc   >90   GFR Calc   >90   GFR Calc     POTASSIUM 4.7 4.1 4.1 4.4   TSH 0.92  --   --  0.75      Mammogram Screening: Patient over age 50, mutual decision to screen reflected in health maintenance.    Pertinent mammograms are reviewed under the imaging tab.    History of abnormal Pap smear: YES - updated in Problem List and Health Maintenance accordingly  PAP / HPV Latest Ref Rng & Units 3/18/2016 1/19/2015 10/29/2013   PAP - NIL ASC-US(A) ASC-US(A)   HPV 16 DNA NEG Negative - -   HPV 18 DNA NEG Negative - -   OTHER HR HPV NEG Negative - -     Reviewed and updated as needed this visit by clinical staff  Tobacco  Allergies  Meds  Problems  Med Hx  Surg Hx  Fam Hx  Soc Hx          Reviewed and updated as needed this visit by Provider  Meds  Med Hx          Review of Systems   Constitutional: Negative for chills and fever.   HENT: Negative for congestion, ear pain, hearing loss and sore throat.    Eyes: Negative for pain and visual disturbance.   Respiratory: Negative for cough and shortness of breath.    Cardiovascular: Negative for chest pain, palpitations and peripheral edema.   Gastrointestinal: Positive for nausea. Negative for abdominal pain, constipation, diarrhea, heartburn and hematochezia.   Breasts:  Negative for tenderness, breast mass and discharge.   Genitourinary: Negative for dysuria, frequency, genital sores, hematuria, pelvic pain, urgency, vaginal bleeding and vaginal discharge.   Musculoskeletal: Negative for arthralgias, joint swelling and myalgias.   Skin: Negative for rash.   Neurological: Negative for dizziness, weakness, headaches and paresthesias.   Psychiatric/Behavioral: Negative for mood changes. The patient is not nervous/anxious.      GI: POSITIVE for vomiting   : POSITIVE for urine incontinence       This document  "serves as a record of the services and decisions personally performed and made by Sadaf Polanco MD. It was created on her behalf by Sally Salgado, a trained medical scribe. The creation of this document is based on the provider's statements to the medical scribe.  Sally Salgado August 15, 2019 7:42 AM      OBJECTIVE:   /80 (BP Location: Right arm, Patient Position: Sitting, Cuff Size: Adult Regular)   Pulse 72   Temp 98.1  F (36.7  C) (Oral)   Resp 16   Ht 1.645 m (5' 4.75\")   Wt 73.3 kg (161 lb 9.6 oz)   LMP 10/05/2012   BMI 27.10 kg/m       Physical Exam  GENERAL APPEARANCE: healthy, alert and no distress  EYES: Eyes grossly normal to inspection and conjunctivae and sclerae normal  HENT: ear canals and TM's normal, nose and mouth without ulcers or lesions, oropharynx clear and oral mucous membranes moist  NECK: no adenopathy, no asymmetry, masses, or scars and thyroid normal to palpation  RESP: lungs clear to auscultation - no rales, rhonchi or wheezes  BREAST: normal without masses, tenderness or nipple discharge and no palpable axillary masses or adenopathy  CV: regular rate and rhythm, normal S1 S2, no S3 or S4, no murmur, click or rub, no peripheral edema and peripheral pulses strong  ABDOMEN: soft, nontender, no hepatosplenomegaly, no masses and bowel sounds normal   (female): normal female external genitalia, normal urethral meatus, vaginal mucosal atrophy noted, normal cervix, adnexae, and uterus without masses or abnormal discharge. Weak pelvic floor muscles.   MS: no musculoskeletal defects are noted and gait is age appropriate without ataxia  SKIN: erythema surrounding skin lesion on lower left leg 3cm scratch surrounded by pink, no drainage, slightly warm, not tender  NEURO: Normal strength and tone, sensory exam grossly normal, mentation intact and speech normal  PSYCH: mentation appears normal and affect normal/bright    Diagnostic Test Results:  Labs reviewed in Epic  No results " found for this or any previous visit (from the past 24 hour(s)).    ASSESSMENT/PLAN:   (Z00.00) Routine general medical examination at a health care facility  (primary encounter diagnosis)  Comment: Fasting labs today, normal exam, other the infected scratch on lower leg  Plan: Pap imaged thin layer screen with HPV -         recommended age 30 - 65, HPV High Risk Types         DNA Cervical, Lipid panel reflex to direct LDL         Fasting, Comprehensive metabolic panel, TSH         with free T4 reflex, CBC with platelets, MA         Screening Digital Bilateral, Vitamin D         Deficiency          (Z12.11) Screen for colon cancer  Comment: Due for colonoscopy  Plan: GASTROENTEROLOGY ADULT REF PROCEDURE ONLY         [facility]  [phone]; [place]         General Surgery          (K14.6) Burning tongue  Comment: Improved with nortriptyline. Continue same med    (E78.5) Hyperlipidemia with target LDL less than 130  Comment: Recommended a low fat diet to help lower cholesterol--switching to 1% or skim dairy products. Continue statin    (E28.2) PCOS (polycystic ovarian syndrome)  Comment: Labs today. Continue metformin  Plan: Hemoglobin A1c          (N81.89) Weakness of pelvic floor  Comment: Patient has been doing Kegel exercises for years with little benefit. Recommended a trial of premarin cream.    Plan: conjugated estrogens (PREMARIN) 0.625 MG/GM         vaginal cream          (L03.115) Cellulitis of right lower extremity  Comment: Starting keflex. Follow up if erythema worsens   Plan: cephALEXin (KEFLEX) 500 MG capsule          GERD: Ongoing retching. Recommended patient decrease acidic and fatty foods. Aim for a low fat diet. Recommended a wedge pillow to elevate head at night. Continue with omeprazole and ranitidine.     Follow up in 6-12 months    COUNSELING:  Reviewed preventive health counseling, as reflected in patient instructions       Regular exercise       Healthy diet/nutrition       Colon  "cancer screening    Estimated body mass index is 27.1 kg/m  as calculated from the following:    Height as of this encounter: 1.645 m (5' 4.75\").    Weight as of this encounter: 73.3 kg (161 lb 9.6 oz).  Weight management plan: Discussed healthy diet and exercise guidelines   reports that she quit smoking about 39 years ago. She smoked 0.00 packs per day for 0.00 years. She has never used smokeless tobacco.    Counseling Resources:  ATP IV Guidelines  Pooled Cohorts Equation Calculator  Breast Cancer Risk Calculator  FRAX Risk Assessment  ICSI Preventive Guidelines  Dietary Guidelines for Americans, 2010  USDA's MyPlate  ASA Prophylaxis  Lung CA Screening    The information in this document, created by the medical scribe for me, accurately reflects the services I personally performed and the decisions made by me. I have reviewed and approved this document for accuracy prior to leaving the patient care area.  August 15, 2019 8:10 AM    Sadaf Polanco MD  [facility]  "

## 2019-08-16 LAB
ALBUMIN SERPL-MCNC: 3.7 G/DL (ref 3.4–5)
ALP SERPL-CCNC: 75 U/L (ref 40–150)
ALT SERPL W P-5'-P-CCNC: 30 U/L (ref 0–50)
ANION GAP SERPL CALCULATED.3IONS-SCNC: 8 MMOL/L (ref 3–14)
AST SERPL W P-5'-P-CCNC: 20 U/L (ref 0–45)
BILIRUB SERPL-MCNC: 0.5 MG/DL (ref 0.2–1.3)
BUN SERPL-MCNC: 16 MG/DL (ref 7–30)
CALCIUM SERPL-MCNC: 9 MG/DL (ref 8.5–10.1)
CHLORIDE SERPL-SCNC: 103 MMOL/L (ref 94–109)
CHOLEST SERPL-MCNC: 207 MG/DL
CO2 SERPL-SCNC: 28 MMOL/L (ref 20–32)
CREAT SERPL-MCNC: 0.67 MG/DL (ref 0.52–1.04)
DEPRECATED CALCIDIOL+CALCIFEROL SERPL-MC: 62 UG/L (ref 20–75)
GFR SERPL CREATININE-BSD FRML MDRD: >90 ML/MIN/{1.73_M2}
GLUCOSE SERPL-MCNC: 89 MG/DL (ref 70–99)
HDLC SERPL-MCNC: 86 MG/DL
LDLC SERPL CALC-MCNC: 111 MG/DL
NONHDLC SERPL-MCNC: 121 MG/DL
POTASSIUM SERPL-SCNC: 4.1 MMOL/L (ref 3.4–5.3)
PROT SERPL-MCNC: 7.1 G/DL (ref 6.8–8.8)
SODIUM SERPL-SCNC: 139 MMOL/L (ref 133–144)
TRIGL SERPL-MCNC: 49 MG/DL
TSH SERPL DL<=0.005 MIU/L-ACNC: 0.96 MU/L (ref 0.4–4)

## 2019-08-20 LAB
COPATH REPORT: NORMAL
PAP: NORMAL

## 2019-08-21 LAB
FINAL DIAGNOSIS: NORMAL
HPV HR 12 DNA CVX QL NAA+PROBE: NEGATIVE
HPV16 DNA SPEC QL NAA+PROBE: NEGATIVE
HPV18 DNA SPEC QL NAA+PROBE: NEGATIVE
SPECIMEN DESCRIPTION: NORMAL
SPECIMEN SOURCE CVX/VAG CYTO: NORMAL

## 2019-08-26 ENCOUNTER — HOSPITAL ENCOUNTER (OUTPATIENT)
Dept: MAMMOGRAPHY | Facility: CLINIC | Age: 64
Discharge: HOME | End: 2019-08-26
Attending: FAMILY MEDICINE | Admitting: FAMILY MEDICINE
Payer: COMMERCIAL

## 2019-08-26 DIAGNOSIS — Z00.00 ROUTINE GENERAL MEDICAL EXAMINATION AT A HEALTH CARE FACILITY: ICD-10-CM

## 2019-08-26 PROCEDURE — 77063 BREAST TOMOSYNTHESIS BI: CPT

## 2019-09-25 DIAGNOSIS — R53.83 OTHER FATIGUE: ICD-10-CM

## 2019-09-25 DIAGNOSIS — R03.0 ELEVATED BLOOD PRESSURE READING WITHOUT DIAGNOSIS OF HYPERTENSION: ICD-10-CM

## 2019-09-25 RX ORDER — BUPROPION HYDROCHLORIDE 150 MG/1
150 TABLET ORAL EVERY MORNING
Qty: 90 TABLET | Refills: 1
Start: 2019-09-25

## 2019-09-25 RX ORDER — HYDROCHLOROTHIAZIDE 12.5 MG/1
12.5 CAPSULE ORAL DAILY
Qty: 90 CAPSULE | Refills: 0 | Status: SHIPPED | OUTPATIENT
Start: 2019-09-25 | End: 2019-12-24

## 2019-09-25 NOTE — TELEPHONE ENCOUNTER
"Requested Prescriptions   Pending Prescriptions Disp Refills     hydrochlorothiazide (MICROZIDE) 12.5 MG capsule 90 capsule 0     Sig: Take 1 capsule (12.5 mg) by mouth daily   Last Written Prescription Date:  7/16/19  Last Fill Quantity: 90,  # refills: 0   Last Office Visit: 8/15/2019 [facility]    Future Office Visit:         Diuretics (Including Combos) Protocol Passed - 9/25/2019  9:13 AM        Passed - Blood pressure under 140/90 in past 12 months     BP Readings from Last 3 Encounters:   08/15/19 110/80   07/05/19 128/84   09/07/18 130/82                 Passed - Recent (12 mo) or future (30 days) visit within the authorizing provider's specialty     Patient had office visit in the last 12 months or has a visit in the next 30 days with authorizing provider or within the authorizing provider's specialty.  See \"Patient Info\" tab in inbasket, or \"Choose Columns\" in Meds & Orders section of the refill encounter.              Passed - Medication is active on med list        Passed - Patient is age 18 or older        Passed - No active pregancy on record        Passed - Normal serum creatinine on file in past 12 months     Recent Labs   Lab Test 08/15/19  0819   CR 0.67              Passed - Normal serum potassium on file in past 12 months     Recent Labs   Lab Test 08/15/19  0819   POTASSIUM 4.1                    Passed - Normal serum sodium on file in past 12 months     Recent Labs   Lab Test 08/15/19  0819                 Passed - No positive pregnancy test in past 12 months        "

## 2019-09-25 NOTE — TELEPHONE ENCOUNTER
"    buPROPion (WELLBUTRIN XL) 150 MG 24 hr tablet 90 tablet 1     Sig: Take 1 tablet (150 mg) by mouth every morning   Last Written Prescription Date:  7/5/19  Last Fill Quantity: 90,  # refills: 1   Last Office Visit: 8/15/2019 [facility]  Follow up in 6-12 months    Future Office Visit:         SSRIs Protocol Passed - 9/25/2019  9:25 AM   PHQ-9 SCORE 7/21/2016 8/15/2019   PHQ-9 Total Score 0 0     LAY-7 SCORE 7/21/2016   Total Score 0          Passed - Recent (12 mo) or future (30 days) visit within the authorizing provider's specialty     Patient had office visit in the last 12 months or has a visit in the next 30 days with authorizing provider or within the authorizing provider's specialty.  See \"Patient Info\" tab in inbasket, or \"Choose Columns\" in Meds & Orders section of the refill encounter.              Passed - Medication is Bupropion     If the medication is Bupropion (Wellbutrin), and the patient is taking for smoking cessation; OK to refill.          Passed - Medication is active on med list        Passed - Patient is age 18 or older        Passed - No active pregnancy on record        Passed - No positive pregnancy test in last 12 months        "

## 2019-09-26 ENCOUNTER — APPOINTMENT (OUTPATIENT)
Dept: SURGERY | Facility: PHYSICIAN GROUP | Age: 64
End: 2019-09-26
Payer: COMMERCIAL

## 2019-09-26 ENCOUNTER — HOSPITAL ENCOUNTER (OUTPATIENT)
Facility: CLINIC | Age: 64
Discharge: HOME | End: 2019-09-26
Attending: SURGERY | Admitting: SURGERY
Payer: COMMERCIAL

## 2019-09-26 VITALS
SYSTOLIC BLOOD PRESSURE: 119 MMHG | OXYGEN SATURATION: 94 % | HEART RATE: 69 BPM | WEIGHT: 161 LBS | RESPIRATION RATE: 16 BRPM | DIASTOLIC BLOOD PRESSURE: 101 MMHG | HEIGHT: 65 IN | BODY MASS INDEX: 26.82 KG/M2

## 2019-09-26 LAB
COLONOSCOPY: NORMAL
GLUCOSE BLDC GLUCOMTR-MCNC: 88 MG/DL (ref 70–99)

## 2019-09-26 PROCEDURE — G0500 MOD SEDAT ENDO SERVICE >5YRS: HCPCS | Performed by: SURGERY

## 2019-09-26 PROCEDURE — 45378 DIAGNOSTIC COLONOSCOPY: CPT | Performed by: SURGERY

## 2019-09-26 PROCEDURE — G0121 COLON CA SCRN NOT HI RSK IND: HCPCS | Performed by: SURGERY

## 2019-09-26 PROCEDURE — 99152 MOD SED SAME PHYS/QHP 5/>YRS: CPT | Performed by: SURGERY

## 2019-09-26 PROCEDURE — 99153 MOD SED SAME PHYS/QHP EA: CPT | Performed by: SURGERY

## 2019-09-26 PROCEDURE — 82962 GLUCOSE BLOOD TEST: CPT

## 2019-09-26 PROCEDURE — 25000128 H RX IP 250 OP 636: Performed by: SURGERY

## 2019-09-26 RX ORDER — NALOXONE HYDROCHLORIDE 0.4 MG/ML
.1-.4 INJECTION, SOLUTION INTRAMUSCULAR; INTRAVENOUS; SUBCUTANEOUS
Status: DISCONTINUED | OUTPATIENT
Start: 2019-09-26 | End: 2019-09-26 | Stop reason: HOSPADM

## 2019-09-26 RX ORDER — FENTANYL CITRATE 50 UG/ML
INJECTION, SOLUTION INTRAMUSCULAR; INTRAVENOUS PRN
Status: DISCONTINUED | OUTPATIENT
Start: 2019-09-26 | End: 2019-09-26 | Stop reason: HOSPADM

## 2019-09-26 RX ORDER — ONDANSETRON 2 MG/ML
4 INJECTION INTRAMUSCULAR; INTRAVENOUS EVERY 6 HOURS PRN
Status: DISCONTINUED | OUTPATIENT
Start: 2019-09-26 | End: 2019-09-26 | Stop reason: HOSPADM

## 2019-09-26 RX ORDER — FLUMAZENIL 0.1 MG/ML
0.2 INJECTION, SOLUTION INTRAVENOUS
Status: DISCONTINUED | OUTPATIENT
Start: 2019-09-26 | End: 2019-09-26 | Stop reason: HOSPADM

## 2019-09-26 RX ORDER — ONDANSETRON 4 MG/1
4 TABLET, ORALLY DISINTEGRATING ORAL EVERY 6 HOURS PRN
Status: DISCONTINUED | OUTPATIENT
Start: 2019-09-26 | End: 2019-09-26 | Stop reason: HOSPADM

## 2019-09-26 RX ORDER — ONDANSETRON 2 MG/ML
4 INJECTION INTRAMUSCULAR; INTRAVENOUS
Status: DISCONTINUED | OUTPATIENT
Start: 2019-09-26 | End: 2019-09-26 | Stop reason: HOSPADM

## 2019-09-26 RX ORDER — LIDOCAINE 40 MG/G
CREAM TOPICAL
Status: DISCONTINUED | OUTPATIENT
Start: 2019-09-26 | End: 2019-09-26 | Stop reason: HOSPADM

## 2019-09-26 ASSESSMENT — MIFFLIN-ST. JEOR: SCORE: 1287.2

## 2019-09-26 NOTE — LETTER
August 27, 2019      Brittany E Dustin  [address]  [facility] [address]         Thank you for choosing [facility]. You are scheduled for the following service(s).   Please be aware that coverage of these services is subject to the terms and limitations of your health insurance plan.  Call member services at your health plan with any benefit or coverage questions.    Date:  Thursday September 26, 2019             Procedure:  COLONOSCOPY  Doctor:        Dr Pablo   Arrival Time:  0800  *Check in at Emergency/Endoscopy desk*  Procedure Time:  0830      Location:   [facility]        Endoscopy Department, First Floor (Enter through ER Doors) *        [address] [place], [place] [address]      [phone] or [phone] (Formerly [facility], later [facility]) to reschedule      MIRALAX -GATORADE  PREP  Colonoscopy is the most accurate test to detect colon polyps and colon cancer; and the only test where polyps can be removed. During this procedure, a doctor examines the lining of your large intestine and rectum through a flexible tube.   Transportation  You must arrange for a ride for the day of your procedure with a responsible adult. A taxi , Uber, etc, is not an option unless you are accompanied by a responsible adult. If you fail to arrange transportation with a responsible adult, your procedure will be cancelled and rescheduled.    Purchase the  following supplies at your local pharmacy:  - 2 (two) bisacodyl tablets: each tablet contains 5 mg.  (Dulcolax  laxative NOT Dulcolax  stool softener)   - 1 (one) 8.3 oz bottle of Polyethylene Glycol (PEG) 3350 Powder   (MiraLAX , Smooth LAX , ClearLAX  or equivalent)  - 64 oz Gatorade    Regular Gatorade, Gatorade G2 , Powerade , Powerade Zero  or Pedialyte  is acceptable. Red colored flavors are not allowed; all other colors (yellow, green, orange, purple and blue) are okay. It is also okay to buy two 2.12 oz packets of powdered  Gatorade that can be mixed with water to a total volume of 64 oz of liquid.  - 1 (one) 10 oz bottle of Magnesium Citrate (Red colored flavors are not allowed)  It is also okay for you to use a 0.5 oz package of powdered magnesium citrate (17 g) mixed with 10 oz of water.      PREPARATION FOR COLONOSCOPY    7 days before:    Discontinue fiber supplements and medications containing iron. This includes Metamucil  and Fibercon ; and multivitamins with iron.    3 days before:    Begin a low-fiber diet. A low-fiber diet helps making the cleanout more effective.     Examples of a low-fiber diet include (but are not limited to): white bread, white rice, pasta, crackers, fish, chicken, eggs, ground beef, creamy peanut butter, cooked/steamed/boiled vegetables, canned fruit, bananas, melons, milk, plain yogurt cheese, salad dressing and other condiments.     The following are not allowed on a low-fiber diet: seeds, nuts, popcorn, bran, whole wheat, corn, quinoa, raw fruits and vegetables, berries and dried fruit, beans and lentils.    For additional details on low-fiber diet, please refer to the table on the last page.    2 days before:    Continue the low-fiber diet.     Drink at least 8 glasses of water throughout the day.     Stop eating solid foods at 11:45 pm.  1.   2. 1 day before:    In the morning: begin a clear liquid diet (liquids you can see through).     Examples of a clear liquid diet include: water, clear broth or bouillon, Gatorade, Pedialyte or Powerade, carbonated and non-carbonated soft drinks (Sprite , 7-Up , ginger ale), strained fruit juices without pulp (apple, white grape, white cranberry), Jell-O  and popsicles.     The following are not allowed on a clear liquid diet: red liquids, alcoholic beverages, dairy products (milk, creamer, and yogurt), protein shakes, creamy broths, juice with pulp and chewing tobacco.    At noon: take 2 (two) bisacodyl tablets     At 4 (and no later than 6pm): start drinking  the Miralax-Gatorade preparation (8.3 oz of Miralax mixed with 64 oz of Gatorade in a large pitcher). Drink 1(one) 8 oz glass every 15 minutes thereafter, until the mixture is gone.    COLON CLEANSING TIPS: drink adequate amounts of fluids before and after your colon cleansing to prevent dehydration. Stay near a toilet because you will have diarrhea. Even if you are sitting on the toilet, continue to drink the cleansing solution every 15 minutes. If you feel nauseous or vomit, rinse your mouth with water, take a 15 to 30-minute-break and then continue drinking the solution. You will be uncomfortable until the stool has flushed from your colon (in about 2 to 4 hours). You may feel chilled.    Day of your procedure  You may take all of your morning medications including blood pressure medications, blood thinners (if you have not been instructed to stop these by our office), methadone, anti-seizure medications with sips of water 3 hours prior to your procedure or earlier. Do not take insulin or vitamins prior to your procedure. Continue the clear liquid diet.       4 hours prior: drink 10 oz of magnesium citrate. It may be easier to drink it with a straw.    STOP consuming all liquids after that.     Do not take anything by mouth during this time.     Allow extra time to travel to your procedure as you may need to stop and use a restroom along the way.    You are ready for the procedure, if you followed all instructions and your stool is no longer formed, but clear or yellow liquid. If you are unsure whether your colon is clean, please call our office at [phone] before you leave for your appointment.    Bring the following to your procedure:  - Insurance Card/Photo ID.   - List of current medications including over-the-counter medications and supplements.   - Your rescue inhaler if you currently use one to control asthma.      Canceling or rescheduling your appointment:   If you must cancel or reschedule your  appointment, please call [phone] as soon as possible.      COLONOSCOPY PRE-PROCEDURE CHECKLIST    If you have diabetes, ask your regular doctor for diet and medication restrictions.  If you take an anticoagulant or anti-platelet medication (such as Coumadin , Lovenox , Pradaxa , Xarelto , Eliquis , etc.), please call your primary doctor for advice on holding this medication.  If you take aspirin you may continue to do so.  If you are or may be pregnant, please discuss the risks and benefits of this procedure with your doctor.        What happens during a colonoscopy?    Plan to spend up to two hours, starting at registration time, at the endoscopy center the day of your procedure. The colonoscopy takes an average of 15 to 30 minutes. Recovery time is about 30 minutes.      Before the exam:    You will change into a gown.    Your medical history and medication list will be reviewed with you, unless that has been done over the phone prior to the procedure.     A nurse will insert an intravenous (IV) line into your hand or arm.    The doctor will meet with you and will give you a consent form to sign.  1. During the exam:     Medicine will be given through the IV line to help you relax.     Your heart rate and oxygen levels will be monitored. If your blood pressure is low, you may be given fluids through the IV line.     The doctor will insert a flexible hollow tube, called a colonoscope, into your rectum. The scope will be advanced slowly through the large intestine (colon).    You may have a feeling of fullness or pressure.     If an abnormal tissue or a polyp is found, the doctor may remove it through the endoscope for closer examination, or biopsy. Tissue removal is painless    After the exam:           Any tissue samples removed during the exam will be sent to a lab for evaluation. It may take 5-7 working days for you to be notified of the results.     A nurse will provide you with complete discharge  instructions before you leave the endoscopy center. Be sure to ask the nurse for specific instructions if you take blood thinners such as Aspirin, Coumadin or Plavix.     The doctor will prepare a full report for you and for the physician who referred you for the procedure.     Your doctor will talk with you about the initial results of your exam.      Medication given during the exam will prohibit you from driving for the rest of the day.     Following the exam, you may resume your normal diet. Your first meal should be light, no greasy foods. Avoid alcohol until the next day.     You may resume your regular activities the day after the procedure.         LOW-FIBER DIET    Foods RECOMMENDED Foods to AVOID   Breads, Cereal, Rice and Pasta:   White bread, rolls, biscuits, croissant and nini toast.   Waffles, Arabic toast and pancakes.   White rice, noodles, pasta, macaroni and peeled cooked potatoes.   Plain crackers and saltines.   Cooked cereals: farina, cream of rice.   Cold cereals: Puffed Rice , Rice Krispies , Corn Flakes  and Special K    Breads, Cereal, Rice and Pasta:   Breads or rolls with nuts, seeds or fruit.   Whole wheat, pumpernickel, rye breads and cornbread.   Potatoes with skin, brown or wild rice, and kasha (buckwheat).     Vegetables:   Tender cooked and canned vegetables without seeds: carrots, asparagus tips, green or wax beans, pumpkin, spinach, lima beans. Vegetables:   Raw or steamed vegetables.   Vegetables with seeds.   Sauerkraut.   Winter squash, peas, broccoli, Brussel sprouts, cabbage, onions, cauliflower, baked beans, peas and corn.   Fruits:   Strained fruit juice.   Canned fruit, except pineapple.   Ripe bananas and melon. Fruits:   Prunes and prune juice.   Raw fruits.   Dried fruits: figs, dates and raisins.   Milk/Dairy:   Milk: plain or flavored.   Yogurt, custard and ice cream.   Cheese and cottage cheese Milk/Dairy:     Meat and other proteins:   ground, well-cooked tender  beef, lamb, ham, veal, pork, fish, poultry and organ meats.   Eggs.   Peanut butter without nuts. Meat and other proteins:   Tough, fibrous meats with gristle.   Dry beans, peas and lentils.   Peanut butter with nuts.   Tofu.   Fats, Snack, Sweets, Condiments and Beverages:   Margarine, butter, oils, mayonnaise, sour cream and salad dressing, plain gravy.   Sugar, hard candy, clear jelly, honey and syrup.   Spices, cooked herbs, bouillon, broth and soups made with allowed vegetable, ketchup and mustard.   Coffee, tea and carbonated drinks.   Plain cakes, cookies and pretzels.   Gelatin, plain puddings, custard, ice cream, sherbet and popsicles. Fats, Snack, Sweets, Condiments and Beverages:   Nuts, seeds and coconut.   Jam, marmalade and preserves.   Pickles, olives, relish and horseradish.   All desserts containing nuts, seeds, dried fruit and coconut; or made from whole grains or bran.   Candy made with nuts or seeds.   Popcorn.                     DIRECTIONS TO THE ENDOSCOPY DEPARTMENT     From the north ([facility])  Take [address] South, exit on [address] [address]. Get into the left hand kareem, turn left (east), go one-half mile to [address] and turn left. Go north to the first stoplight, take a right on [address] and follow it to the Emergency entrance.    From the south ([facility])  Take [address]N to the [address] split and exit on [address] [address]. On [address] [address], turn left (west) to [address]. Turn right (north) on [address]. Go north to the first stoplight, take a right on [address] and follow it to the Emergency entrance.    From the east via 35E ([facility])  Take [address] south to [address] [address] exit. Turn right on [address] [address]. Go west to [address]. Turn right (north) on [address]. Go to the first stoplight, take a right and follow on [address] to the Emergency entrance.    From the east via [address] ([facility])  [address] [facility] [facility] [address]  to [address]. Turn left (south) on [address] to [address]. Turn left (east) on [address] and follow it to the Emergency entrance.    From the west via [address] ([place])  Take [address] east to [address]. Turn right (south) on [address] to [address]. Turn left (east) on [address] and follow it to the Emergency entrance.

## 2019-09-26 NOTE — PRE-PROCEDURE
GENERAL PRE-PROCEDURE:   Procedure:  Colonoscopy  Date/Time:  9/26/2019 8:31 AM    Written consent obtained?: Yes    Risks and benefits: Risks, benefits and alternatives were discussed    Consent given by:  Patient  Patient states understanding of procedure being performed: Yes    Patient's understanding of procedure matches consent: Yes    Procedure consent matches procedure scheduled: Yes    Expected level of sedation:  Moderate  Appropriately NPO:  Yes  ASA Class:  Class 2- mild systemic disease, no acute problems, no functional limitations  Mallampati  :  Grade 2- soft palate, base of uvula, tonsillar pillars, and portion of posterior pharyngeal wall visible  Lungs:  Lungs clear with good breath sounds bilaterally  Heart:  Normal heart sounds and rate  History & Physical reviewed:  History and physical reviewed and no updates needed  Statement of review:  I have reviewed the lab findings, diagnostic data, medications, and the plan for sedation  Renita Pablo MD

## 2019-09-27 ENCOUNTER — HEALTH MAINTENANCE LETTER (OUTPATIENT)
Age: 64
End: 2019-09-27

## 2019-10-22 DIAGNOSIS — K14.6 BURNING TONGUE: ICD-10-CM

## 2019-10-22 NOTE — TELEPHONE ENCOUNTER
"Requested Prescriptions   Pending Prescriptions Disp Refills     nortriptyline (PAMELOR) 10 MG capsule      Last Written Prescription Date:  7/5/19  Last Fill Quantity: 30 capsule,  # refills: 3   Last office visit: 8/15/2019 with prescribing provider:  Collin     Future Office Visit:     30 capsule 3     Sig: Take 1 capsule (10 mg) by mouth At Bedtime       Tricyclic Agents ( Annual appt and no PHQ9) Failed - 10/22/2019  9:23 AM        Failed - Blood Pressure under 140/90 in past 12 mos     BP Readings from Last 3 Encounters:   09/26/19 (!) 119/101   08/15/19 110/80   07/05/19 128/84                 Passed - Recent (12 mo) or future (30 days) visit within authorizing provider's specialty     Patient has had an office visit with the authorizing provider or a provider within the authorizing providers department within the previous 12 mos or has a future within next 30 days. See \"Patient Info\" tab in inbasket, or \"Choose Columns\" in Meds & Orders section of the refill encounter.              Passed - Medication is active on med list        Passed - Patient is age 18 or older        Passed - Patient is not pregnant        Passed - No positive pregnancy test on record in past 12 mos        "

## 2019-10-22 NOTE — LETTER
[address], [address]  [facility] [id number] [phone]  October 25, 2019    Brittany Chan  [address]  [facility] [address]      Dear Brittany,    I care about your health and have reviewed your health plan.  I have reviewed your medical conditions, medication list, and lab results and am making recommendations  based on this review, to better manage your health.    You are particularly in need of attention regarding:  -medication refill    I am recommending that you:  -schedule a NURSE-ONLY BLOOD PRESSURE APPOINTMENT within the next 1-4 weeks.      Here is a list of Health Maintenance topics that are due now or due soon:  Health Maintenance Due   Topic Date Due     ZOSTER IMMUNIZATION (1 of 2) 03/11/2007     ADVANCE CARE PLANNING  10/12/2017     INFLUENZA VACCINE (1) 09/01/2019       Please call us at [phone] (or use [employer]) to address the above   recommendations.    Thank you for trusting [facility] and we appreciate the opportunity to serve you.  We look forward to supporting your healthcare needs in the future.    Healthy Regards,    Sadaf Polanco MD

## 2019-10-24 RX ORDER — NORTRIPTYLINE HCL 10 MG
10 CAPSULE ORAL AT BEDTIME
Qty: 30 CAPSULE | Refills: 1 | Status: SHIPPED | OUTPATIENT
Start: 2019-10-24 | End: 2019-12-24

## 2019-10-24 NOTE — TELEPHONE ENCOUNTER
Routing refill request to provider for review/approval because:  Failed BP Goal    Giovanna Flynn RN -- [facility]

## 2019-11-01 ENCOUNTER — ALLIED HEALTH/NURSE VISIT (OUTPATIENT)
Dept: NURSING | Facility: CLINIC | Age: 64
End: 2019-11-01
Payer: COMMERCIAL

## 2019-11-01 VITALS — DIASTOLIC BLOOD PRESSURE: 70 MMHG | SYSTOLIC BLOOD PRESSURE: 122 MMHG

## 2019-11-01 DIAGNOSIS — Z01.30 BLOOD PRESSURE CHECK: Primary | ICD-10-CM

## 2019-11-01 PROCEDURE — 99207 ZZC NO CHARGE NURSE ONLY: CPT

## 2019-12-24 DIAGNOSIS — K14.6 BURNING TONGUE: ICD-10-CM

## 2019-12-24 DIAGNOSIS — R03.0 ELEVATED BLOOD PRESSURE READING WITHOUT DIAGNOSIS OF HYPERTENSION: ICD-10-CM

## 2019-12-24 RX ORDER — HYDROCHLOROTHIAZIDE 12.5 MG/1
12.5 CAPSULE ORAL DAILY
Qty: 90 CAPSULE | Refills: 1 | Status: SHIPPED | OUTPATIENT
Start: 2019-12-24 | End: 2020-06-22

## 2019-12-24 RX ORDER — NORTRIPTYLINE HCL 10 MG
10 CAPSULE ORAL AT BEDTIME
Qty: 30 CAPSULE | Refills: 3 | Status: SHIPPED | OUTPATIENT
Start: 2019-12-24 | End: 2020-04-23

## 2019-12-24 NOTE — TELEPHONE ENCOUNTER
Prescription approved per [facility] Refill Protocol.  Lindsay Mccrary RN

## 2020-03-23 DIAGNOSIS — R53.83 OTHER FATIGUE: ICD-10-CM

## 2020-03-23 RX ORDER — BUPROPION HYDROCHLORIDE 150 MG/1
150 TABLET ORAL EVERY MORNING
Qty: 90 TABLET | Refills: 1 | Status: SHIPPED | OUTPATIENT
Start: 2020-03-23 | End: 2020-09-24

## 2020-04-20 DIAGNOSIS — K14.6 BURNING TONGUE: ICD-10-CM

## 2020-04-23 RX ORDER — NORTRIPTYLINE HCL 10 MG
10 CAPSULE ORAL AT BEDTIME
Qty: 30 CAPSULE | Refills: 3 | Status: SHIPPED | OUTPATIENT
Start: 2020-04-23 | End: 2020-08-19

## 2020-06-22 DIAGNOSIS — R03.0 ELEVATED BLOOD PRESSURE READING WITHOUT DIAGNOSIS OF HYPERTENSION: ICD-10-CM

## 2020-06-22 RX ORDER — HYDROCHLOROTHIAZIDE 12.5 MG/1
12.5 CAPSULE ORAL DAILY
Qty: 90 CAPSULE | Refills: 0 | Status: SHIPPED | OUTPATIENT
Start: 2020-06-22 | End: 2020-09-24

## 2020-06-22 NOTE — TELEPHONE ENCOUNTER
Prescription approved per [facility] Refill Protocol.  Lindsay Mccrary RN

## 2020-07-22 DIAGNOSIS — E78.5 HYPERLIPIDEMIA WITH TARGET LDL LESS THAN 130: ICD-10-CM

## 2020-07-22 DIAGNOSIS — E28.2 PCOS (POLYCYSTIC OVARIAN SYNDROME): ICD-10-CM

## 2020-07-22 DIAGNOSIS — K21.9 GASTROESOPHAGEAL REFLUX DISEASE, ESOPHAGITIS PRESENCE NOT SPECIFIED: ICD-10-CM

## 2020-07-22 RX ORDER — SIMVASTATIN 20 MG
20 TABLET ORAL AT BEDTIME
Qty: 90 TABLET | Refills: 0 | Status: SHIPPED | OUTPATIENT
Start: 2020-07-22 | End: 2020-10-27

## 2020-07-22 RX ORDER — METFORMIN HCL 500 MG
500 TABLET, EXTENDED RELEASE 24 HR ORAL
Qty: 90 TABLET | Refills: 0 | Status: SHIPPED | OUTPATIENT
Start: 2020-07-22 | End: 2020-10-07

## 2020-08-18 DIAGNOSIS — K14.6 BURNING TONGUE: ICD-10-CM

## 2020-08-19 RX ORDER — NORTRIPTYLINE HCL 10 MG
10 CAPSULE ORAL AT BEDTIME
Qty: 30 CAPSULE | Refills: 3 | Status: SHIPPED | OUTPATIENT
Start: 2020-08-19 | End: 2020-10-07

## 2020-08-19 NOTE — TELEPHONE ENCOUNTER
Routing refill request to provider for review/approval because:  Patient needs to be seen because:  Annual visit due  Sigrid Amin RN, BSN  Message handled by CLINIC NURSE.

## 2020-09-04 ENCOUNTER — HOSPITAL ENCOUNTER (OUTPATIENT)
Dept: MAMMOGRAPHY | Facility: CLINIC | Age: 65
Discharge: HOME | End: 2020-09-04
Attending: FAMILY MEDICINE | Admitting: FAMILY MEDICINE
Payer: COMMERCIAL

## 2020-09-04 DIAGNOSIS — Z12.31 OTHER SCREENING MAMMOGRAM: ICD-10-CM

## 2020-09-04 PROCEDURE — 77063 BREAST TOMOSYNTHESIS BI: CPT

## 2020-09-23 DIAGNOSIS — R03.0 ELEVATED BLOOD PRESSURE READING WITHOUT DIAGNOSIS OF HYPERTENSION: ICD-10-CM

## 2020-09-23 DIAGNOSIS — R53.83 OTHER FATIGUE: ICD-10-CM

## 2020-09-24 RX ORDER — HYDROCHLOROTHIAZIDE 12.5 MG/1
12.5 CAPSULE ORAL DAILY
Qty: 90 CAPSULE | Refills: 0 | Status: SHIPPED | OUTPATIENT
Start: 2020-09-24 | End: 2020-12-17

## 2020-09-24 RX ORDER — BUPROPION HYDROCHLORIDE 150 MG/1
150 TABLET ORAL EVERY MORNING
Qty: 90 TABLET | Refills: 0 | Status: SHIPPED | OUTPATIENT
Start: 2020-09-24 | End: 2020-10-07

## 2020-09-24 NOTE — TELEPHONE ENCOUNTER
Has appointment scheduled  Prescription approved per [facility] Refill Protocol.  Sigrid Amin RN, BSN  Message handled by CLINIC NURSE.

## 2020-10-07 ENCOUNTER — OFFICE VISIT (OUTPATIENT)
Dept: FAMILY MEDICINE | Facility: CLINIC | Age: 65
End: 2020-10-07
Payer: COMMERCIAL

## 2020-10-07 VITALS
WEIGHT: 158.4 LBS | SYSTOLIC BLOOD PRESSURE: 118 MMHG | HEIGHT: 66 IN | DIASTOLIC BLOOD PRESSURE: 77 MMHG | RESPIRATION RATE: 16 BRPM | HEART RATE: 73 BPM | OXYGEN SATURATION: 97 % | TEMPERATURE: 98.1 F | BODY MASS INDEX: 25.46 KG/M2

## 2020-10-07 DIAGNOSIS — K21.9 GASTROESOPHAGEAL REFLUX DISEASE WITHOUT ESOPHAGITIS: ICD-10-CM

## 2020-10-07 DIAGNOSIS — E28.2 PCOS (POLYCYSTIC OVARIAN SYNDROME): ICD-10-CM

## 2020-10-07 DIAGNOSIS — R03.0 ELEVATED BLOOD PRESSURE READING WITHOUT DIAGNOSIS OF HYPERTENSION: ICD-10-CM

## 2020-10-07 DIAGNOSIS — E78.5 HYPERLIPIDEMIA LDL GOAL <130: ICD-10-CM

## 2020-10-07 DIAGNOSIS — F41.9 ANXIETY: ICD-10-CM

## 2020-10-07 DIAGNOSIS — Z00.00 ROUTINE GENERAL MEDICAL EXAMINATION AT A HEALTH CARE FACILITY: Primary | ICD-10-CM

## 2020-10-07 LAB
ALBUMIN SERPL-MCNC: 3.5 G/DL (ref 3.4–5)
ALP SERPL-CCNC: 70 U/L (ref 40–150)
ALT SERPL W P-5'-P-CCNC: 41 U/L (ref 0–50)
ANION GAP SERPL CALCULATED.3IONS-SCNC: 6 MMOL/L (ref 3–14)
AST SERPL W P-5'-P-CCNC: 24 U/L (ref 0–45)
BILIRUB SERPL-MCNC: 0.4 MG/DL (ref 0.2–1.3)
BUN SERPL-MCNC: 17 MG/DL (ref 7–30)
CALCIUM SERPL-MCNC: 9.1 MG/DL (ref 8.5–10.1)
CHLORIDE SERPL-SCNC: 105 MMOL/L (ref 94–109)
CHOLEST SERPL-MCNC: 195 MG/DL
CO2 SERPL-SCNC: 28 MMOL/L (ref 20–32)
CREAT SERPL-MCNC: 0.7 MG/DL (ref 0.52–1.04)
ERYTHROCYTE [DISTWIDTH] IN BLOOD BY AUTOMATED COUNT: 14.3 % (ref 10–15)
GFR SERPL CREATININE-BSD FRML MDRD: >90 ML/MIN/{1.73_M2}
GLUCOSE SERPL-MCNC: 98 MG/DL (ref 70–99)
HBA1C MFR BLD: 5.3 % (ref 0–5.6)
HCT VFR BLD AUTO: 40.3 % (ref 35–47)
HDLC SERPL-MCNC: 100 MG/DL
HGB BLD-MCNC: 12.9 G/DL (ref 11.7–15.7)
LDLC SERPL CALC-MCNC: 86 MG/DL
MCH RBC QN AUTO: 28.2 PG (ref 26.5–33)
MCHC RBC AUTO-ENTMCNC: 32 G/DL (ref 31.5–36.5)
MCV RBC AUTO: 88 FL (ref 78–100)
NONHDLC SERPL-MCNC: 95 MG/DL
PLATELET # BLD AUTO: 247 10E9/L (ref 150–450)
POTASSIUM SERPL-SCNC: 4.2 MMOL/L (ref 3.4–5.3)
PROT SERPL-MCNC: 6.9 G/DL (ref 6.8–8.8)
RBC # BLD AUTO: 4.57 10E12/L (ref 3.8–5.2)
SODIUM SERPL-SCNC: 139 MMOL/L (ref 133–144)
TRIGL SERPL-MCNC: 45 MG/DL
TSH SERPL DL<=0.005 MIU/L-ACNC: 0.86 MU/L (ref 0.4–4)
WBC # BLD AUTO: 4.1 10E9/L (ref 4–11)

## 2020-10-07 PROCEDURE — 80061 LIPID PANEL: CPT | Performed by: FAMILY MEDICINE

## 2020-10-07 PROCEDURE — 80053 COMPREHEN METABOLIC PANEL: CPT | Performed by: FAMILY MEDICINE

## 2020-10-07 PROCEDURE — 85027 COMPLETE CBC AUTOMATED: CPT | Performed by: FAMILY MEDICINE

## 2020-10-07 PROCEDURE — 99213 OFFICE O/P EST LOW 20 MIN: CPT | Mod: 25 | Performed by: FAMILY MEDICINE

## 2020-10-07 PROCEDURE — 99396 PREV VISIT EST AGE 40-64: CPT | Performed by: FAMILY MEDICINE

## 2020-10-07 PROCEDURE — 36415 COLL VENOUS BLD VENIPUNCTURE: CPT | Performed by: FAMILY MEDICINE

## 2020-10-07 PROCEDURE — 83036 HEMOGLOBIN GLYCOSYLATED A1C: CPT | Performed by: FAMILY MEDICINE

## 2020-10-07 PROCEDURE — 84443 ASSAY THYROID STIM HORMONE: CPT | Performed by: FAMILY MEDICINE

## 2020-10-07 RX ORDER — METFORMIN HCL 500 MG
500 TABLET, EXTENDED RELEASE 24 HR ORAL
Qty: 90 TABLET | Refills: 1 | Status: SHIPPED | OUTPATIENT
Start: 2020-10-07 | End: 2021-05-21

## 2020-10-07 ASSESSMENT — ENCOUNTER SYMPTOMS
CHILLS: 0
COUGH: 0
BREAST MASS: 0
PARESTHESIAS: 0
JOINT SWELLING: 0
DIARRHEA: 0
FREQUENCY: 1
HEMATOCHEZIA: 0
EYE PAIN: 0
NERVOUS/ANXIOUS: 0
FEVER: 0
NAUSEA: 0
CONSTIPATION: 0
DYSURIA: 0
ABDOMINAL PAIN: 0
HEMATURIA: 0
MYALGIAS: 0
WEAKNESS: 0
SHORTNESS OF BREATH: 0
HEADACHES: 0
PALPITATIONS: 0
HEARTBURN: 0
ARTHRALGIAS: 0
SORE THROAT: 0
DIZZINESS: 0

## 2020-10-07 ASSESSMENT — MIFFLIN-ST. JEOR: SCORE: 1282.31

## 2020-10-07 NOTE — PROGRESS NOTES
SUBJECTIVE:   CC: Brittany Chan is an 65 year old woman who presents for preventive health visit.       Patient has been advised of split billing requirements and indicates understanding: Yes  Healthy Habits:     Getting at least 3 servings of Calcium per day:  Yes    Bi-annual eye exam:  Yes    Dental care twice a year:  Yes    Sleep apnea or symptoms of sleep apnea:  None    Diet:  Regular (no restrictions)    Frequency of exercise:  2-3 days/week    Duration of exercise:  45-60 minutes    Taking medications regularly:  Yes    Medication side effects:  Not applicable    PHQ-2 Total Score: 0    Additional concerns today:  Yes    Seeing gynurology soon, for leaking urine.    retirering in April 2021    Her youngest is now moved out and will be graduated. Living in [place].     Her son lives in [place].    The 10-year ASCVD risk score (Cindy HUDSON Jr., et al., 2013) is: 3.1%    Values used to calculate the score:      Age: 65 years      Sex: Female      Is Non- : No      Diabetic: No      Tobacco smoker: No      Systolic Blood Pressure: 118 mmHg      Is BP treated: No      HDL Cholesterol: 86 mg/dL      Total Cholesterol: 207 mg/dL      PROBLEMS TO ADD ON...    Today's PHQ-2 Score:   PHQ-2 ( 1999 [employer]) 10/7/2020   Q1: Little interest or pleasure in doing things 0   Q2: Feeling down, depressed or hopeless 0   PHQ-2 Score 0   Q1: Little interest or pleasure in doing things Not at all   Q2: Feeling down, depressed or hopeless Not at all   PHQ-2 Score 0       Abuse: Current or Past (Physical, Sexual or Emotional) - No  Do you feel safe in your environment? Yes    Have you ever done Advance Care Planning? (For example, a Health Directive, POLST, or a discussion with a medical provider or your loved ones about your wishes): No, advance care planning information given to patient to review.  Patient plans to discuss their wishes with loved ones or provider.      Social History     Tobacco  Use     Smoking status: Former Smoker     Packs/day: 0.00     Years: 0.00     Pack years: 0.00     Quit date: 1980     Years since quittin.7     Smokeless tobacco: Never Used   Substance Use Topics     Alcohol use: Yes     Comment: 5 a week         Alcohol Use 10/7/2020   Prescreen: >3 drinks/day or >7 drinks/week? No   Prescreen: >3 drinks/day or >7 drinks/week? -       Reviewed orders with patient.  Reviewed health maintenance and updated orders accordingly - Yes  BP Readings from Last 3 Encounters:   10/07/20 118/77   19 122/70   19 (!) 119/101    Wt Readings from Last 3 Encounters:   10/07/20 71.8 kg (158 lb 6.4 oz)   19 73 kg (161 lb)   08/15/19 73.3 kg (161 lb 9.6 oz)                  Recent Labs   Lab Test 10/07/20  0905 08/15/19  0819 18  0908   A1C 5.3 5.3 5.2   LDL 86 111* 93    86 93   TRIG 45 49 64   ALT 41 30 30   CR 0.70 0.67 0.60   GFRESTIMATED >90 >90 >90   GFRESTBLACK >90 >90 >90   POTASSIUM 4.2 4.1 4.7   TSH 0.86 0.96 0.92        Mammogram Screening: Patient over age 50, mutual decision to screen reflected in health maintenance.    Pertinent mammograms are reviewed under the imaging tab.  History of abnormal Pap smear: NO - age 30-65 PAP every 5 years with negative HPV co-testing recommended  PAP / HPV Latest Ref Rng & Units 8/15/2019 3/18/2016 2015   PAP - NIL NIL ASC-US(A)   HPV 16 DNA NEG:Negative Negative Negative -   HPV 18 DNA NEG:Negative Negative Negative -   OTHER HR HPV NEG:Negative Negative Negative -     Reviewed and updated as needed this visit by clinical staff  Tobacco  Allergies    Med Hx  Surg Hx  Fam Hx  Soc Hx        Reviewed and updated as needed this visit by Provider                    Review of Systems   Constitutional: Negative for chills and fever.   HENT: Negative for congestion, ear pain, hearing loss and sore throat.    Eyes: Negative for pain and visual disturbance.   Respiratory: Negative for cough and shortness of  "breath.    Cardiovascular: Negative for chest pain, palpitations and peripheral edema.   Gastrointestinal: Negative for abdominal pain, constipation, diarrhea, heartburn, hematochezia and nausea.   Breasts:  Negative for tenderness, breast mass and discharge.   Genitourinary: Positive for frequency and urgency. Negative for dysuria, genital sores, hematuria, pelvic pain, vaginal bleeding and vaginal discharge.   Musculoskeletal: Negative for arthralgias, joint swelling and myalgias.   Skin: Negative for rash.   Neurological: Negative for dizziness, weakness, headaches and paresthesias.   Psychiatric/Behavioral: Negative for mood changes. The patient is not nervous/anxious.            OBJECTIVE:   /77 (BP Location: Right arm, Patient Position: Sitting, Cuff Size: Adult Regular)   Pulse 73   Temp 98.1  F (36.7  C) (Oral)   Resp 16   Ht 1.664 m (5' 5.5\")   Wt 71.8 kg (158 lb 6.4 oz)   LMP 10/05/2012   SpO2 97%   Breastfeeding No   BMI 25.96 kg/m    Physical Exam  GENERAL: healthy, alert and no distress  EYES: Eyes grossly normal to inspection, PERRL and conjunctivae and sclerae normal  HENT: ear canals and TM's normal, nose and mouth without ulcers or lesions  NECK: no adenopathy, no asymmetry, masses, or scars and thyroid normal to palpation  RESP: lungs clear to auscultation - no rales, rhonchi or wheezes  BREAST: normal without masses, tenderness or nipple discharge and no palpable axillary masses or adenopathy  CV: regular rate and rhythm, normal S1 S2, no S3 or S4, no murmur, click or rub, no peripheral edema and peripheral pulses strong  ABDOMEN: soft, nontender, no hepatosplenomegaly, no masses and bowel sounds normal  MS: no gross musculoskeletal defects noted, no edema  SKIN: no suspicious lesions or rashes  NEURO: Normal strength and tone, mentation intact and speech normal  PSYCH: mentation appears normal, affect normal/bright    Diagnostic Test Results:  Labs reviewed in " "Epic    ASSESSMENT/PLAN:   1. Routine general medical examination at a health care facility  Normal exam today  - REVIEW OF HEALTH MAINTENANCE PROTOCOL ORDERS  - Lipid panel reflex to direct LDL Fasting  - CBC with platelets  - TSH with free T4 reflex  - Comprehensive metabolic panel    2. Hyperlipidemia LDL goal <130  Due for labs, discussed diet    3. Anxiety  controlled    4. Gastroesophageal reflux disease without esophagitis  Controlled on meds    5. Elevated blood pressure reading without diagnosis of hypertension  improved    6. PCOS (polycystic ovarian syndrome)  Will cont metformin for prevention of diabetes, pt doing well on low dose, due for a1c  - Hemoglobin A1c  - metFORMIN (GLUCOPHAGE-XR) 500 MG 24 hr tablet; Take 1 tablet (500 mg) by mouth daily (with dinner)  Dispense: 90 tablet; Refill: 1    Patient has been advised of split billing requirements and indicates understanding: Yes  COUNSELING:  Reviewed preventive health counseling, as reflected in patient instructions       Regular exercise       Healthy diet/nutrition    Estimated body mass index is 25.96 kg/m  as calculated from the following:    Height as of this encounter: 1.664 m (5' 5.5\").    Weight as of this encounter: 71.8 kg (158 lb 6.4 oz).    Weight management plan: Discussed healthy diet and exercise guidelines    She reports that she quit smoking about 40 years ago. She smoked 0.00 packs per day for 0.00 years. She has never used smokeless tobacco.      Counseling Resources:  ATP IV Guidelines  Pooled Cohorts Equation Calculator  Breast Cancer Risk Calculator  BRCA-Related Cancer Risk Assessment: FHS-7 Tool  FRAX Risk Assessment  ICSI Preventive Guidelines  Dietary Guidelines for Americans, 2010  USDA's MyPlate  ASA Prophylaxis  Lung CA Screening    Sadaf Polanco MD  [facility]"

## 2020-10-21 DIAGNOSIS — Z11.59 ENCOUNTER FOR SCREENING FOR OTHER VIRAL DISEASES: Primary | ICD-10-CM

## 2020-10-26 DIAGNOSIS — K21.9 GASTROESOPHAGEAL REFLUX DISEASE WITHOUT ESOPHAGITIS: Primary | ICD-10-CM

## 2020-10-26 DIAGNOSIS — E78.5 HYPERLIPIDEMIA WITH TARGET LDL LESS THAN 130: ICD-10-CM

## 2020-10-27 RX ORDER — SIMVASTATIN 20 MG
20 TABLET ORAL AT BEDTIME
Qty: 90 TABLET | Refills: 0 | Status: SHIPPED | OUTPATIENT
Start: 2020-10-27 | End: 2021-01-23

## 2020-10-27 NOTE — TELEPHONE ENCOUNTER
Prescription approved per [facility] [place].    Meaghan Ledbetter RN

## 2020-10-27 NOTE — TELEPHONE ENCOUNTER
Prescription approved per [facility] Refill Protocol.    Meaghan Ledbetter RN

## 2020-11-09 DIAGNOSIS — N81.89 WEAKNESS OF PELVIC FLOOR: ICD-10-CM

## 2020-11-10 NOTE — TELEPHONE ENCOUNTER
Prescription approved per [facility] [place] [place] Refill Protocol.  Lou Carmichael RN, BSN

## 2020-12-11 ENCOUNTER — OFFICE VISIT (OUTPATIENT)
Dept: FAMILY MEDICINE | Facility: CLINIC | Age: 65
End: 2020-12-11
Payer: COMMERCIAL

## 2020-12-11 VITALS
OXYGEN SATURATION: 99 % | BODY MASS INDEX: 27.83 KG/M2 | HEART RATE: 72 BPM | TEMPERATURE: 98 F | WEIGHT: 163 LBS | HEIGHT: 64 IN | SYSTOLIC BLOOD PRESSURE: 110 MMHG | DIASTOLIC BLOOD PRESSURE: 74 MMHG

## 2020-12-11 DIAGNOSIS — N81.89 WEAKNESS OF PELVIC FLOOR: ICD-10-CM

## 2020-12-11 DIAGNOSIS — Z11.59 ENCOUNTER FOR SCREENING FOR OTHER VIRAL DISEASES: ICD-10-CM

## 2020-12-11 DIAGNOSIS — E78.5 HYPERLIPIDEMIA LDL GOAL <130: ICD-10-CM

## 2020-12-11 DIAGNOSIS — R03.0 ELEVATED BLOOD PRESSURE READING WITHOUT DIAGNOSIS OF HYPERTENSION: ICD-10-CM

## 2020-12-11 DIAGNOSIS — Z01.818 PREOP GENERAL PHYSICAL EXAM: Primary | ICD-10-CM

## 2020-12-11 LAB — HGB BLD-MCNC: 12.6 G/DL (ref 11.7–15.7)

## 2020-12-11 PROCEDURE — 85018 HEMOGLOBIN: CPT | Performed by: NURSE PRACTITIONER

## 2020-12-11 PROCEDURE — 99214 OFFICE O/P EST MOD 30 MIN: CPT | Performed by: NURSE PRACTITIONER

## 2020-12-11 PROCEDURE — 80048 BASIC METABOLIC PNL TOTAL CA: CPT | Performed by: NURSE PRACTITIONER

## 2020-12-11 PROCEDURE — 36415 COLL VENOUS BLD VENIPUNCTURE: CPT | Performed by: NURSE PRACTITIONER

## 2020-12-11 PROCEDURE — 93000 ELECTROCARDIOGRAM COMPLETE: CPT | Performed by: NURSE PRACTITIONER

## 2020-12-11 PROCEDURE — U0003 INFECTIOUS AGENT DETECTION BY NUCLEIC ACID (DNA OR RNA); SEVERE ACUTE RESPIRATORY SYNDROME CORONAVIRUS 2 (SARS-COV-2) (CORONAVIRUS DISEASE [COVID-19]), AMPLIFIED PROBE TECHNIQUE, MAKING USE OF HIGH THROUGHPUT TECHNOLOGIES AS DESCRIBED BY CMS-2020-01-R: HCPCS | Performed by: OBSTETRICS & GYNECOLOGY

## 2020-12-11 ASSESSMENT — MIFFLIN-ST. JEOR: SCORE: 1283.33

## 2020-12-11 NOTE — PROGRESS NOTES
[facility]  [address] [address]  Phone: [phone]  Primary Provider: Sadaf Polanco  Pre-op Performing Provider: LILY PHILIPPE RA    PREOPERATIVE EVALUATION:  Today's date: 12/11/2020    Brittany Chan is a 65 year old female who presents for a preoperative evaluation.    Surgical Information:  Surgery/Procedure:       Procedure Laterality Anesthesia   Robotic assisted sacrocolpopexy, supracervical hysterectomy with bilateral salpingectomy, abdominal enterocele repair, midurethral sling, cystoscopy, possible posterior repair          Procedure       Procedure Laterality Anesthesia   Robotic assisted sacrocolpopexy, supracervical hysterectomy with bilateral salpingectomy, abdominal enterocele repair, midurethral sling, cystoscopy, possible posterior repair          Procedure       Procedure Laterality Anesthesia   Robotic assisted sacrocolpopexy, supracervical hysterectomy with bilateral salpingectomy, abdominal enterocele repair, midurethral sling, cystoscopy, possible posterior repair            Surgery Location [facility]  Surgeon: Trey  Surgery Date: 12/14/20  Time of Surgery:12:15p  Where patient plans to recover: At a TCU (Transitional Care Unit)  Fax number for surgical facility: Note does not need to be faxed, will be available electronically in Epic.    Type of Anesthesia Anticipated: General    Subjective     HPI related to upcoming procedure:     Preop Questions 12/11/2020   1. Have you ever had a heart attack or stroke? No   2. Have you ever had surgery on your heart or blood vessels, such as a stent placement, a coronary artery bypass, or surgery on an artery in your head, neck, heart, or legs? No   3. Do you have chest pain with activity? No   4. Do you have a history of  heart failure? No   5. Do you currently have a cold, bronchitis or symptoms of other infection? No   6. Do you have a cough, shortness of breath, or wheezing? No    7. Do you or anyone in your family have previous history of blood clots? No   8. Do you or does anyone in your family have a serious bleeding problem such as prolonged bleeding following surgeries or cuts? No   9. Have you ever had problems with anemia or been told to take iron pills? No   10. Have you had any abnormal blood loss such as black, tarry or bloody stools, or abnormal vaginal bleeding? No   11. Have you ever had a blood transfusion? No   12. Are you willing to have a blood transfusion if it is medically needed before, during, or after your surgery? Yes   13. Have you or any of your relatives ever had problems with anesthesia? No   14. Do you have sleep apnea, excessive snoring or daytime drowsiness? No   15. Do you have any artifical heart valves or other implanted medical devices like a pacemaker, defibrillator, or continuous glucose monitor? No   16. Do you have artificial joints? No   17. Are you allergic to latex? No         }    Status of Chronic Conditions:  See problem list for active medical problems.  Problems all longstanding and stable, except as noted/documented.  See ROS for pertinent symptoms related to these conditions.      Review of Systems  Constitutional, neuro, ENT, endocrine, pulmonary, cardiac, gastrointestinal, genitourinary, musculoskeletal, integument and psychiatric systems are negative, except as otherwise noted.    Patient Active Problem List    Diagnosis Date Noted     Anxiety 05/31/2016     Priority: Medium     Stress incontinence 10/12/2012     Priority: Medium     Overweight (BMI 25.0-29.9) 10/12/2012     Priority: Medium     Family history of diabetes mellitus 09/29/2011     Priority: Medium     Father, borderline, lost weight and went away       Atypical squamous cells cannot exclude high grade squamous intraepithelial lesion on cytologic smear of cervix (ASC-H) 09/29/2011     Priority: Medium     09/29/11 ASC-H: referred for colposcopy by 01/2012  10/28/11 Colposcopy  done with [facility], abstracted in chart. Per patient, results were unremarkable and will be having follow up pap with them by April 2012 05/16/12 Pap= Normal (abstracted into chart)  10/12/12 Dx pap= Normal. Plan r/p 1 year- due 10/2013  10/29/13 Dx pap= ASCUS, Negative HPV. Routine screening.  01/19/15 Pap= ASCUS, Neg HPV. Co-test 1 yr per Dr. Polanco  03/18/16 Pap= NIL, Neg HPV. 3 yr co-test  8/15/19 Pap: NIL/neg HR HPV. Pap in 3 years       HYPERLIPIDEMIA LDL GOAL <130 10/31/2010     Priority: Medium     Hemorrhoid 06/04/2009     Priority: Medium     Mild internal hemorrhoids       PCOS (polycystic ovarian syndrome) 04/28/2009     Priority: Medium     H/o PCOS & irregular periods in past.        Esophageal reflux 01/19/2007     Priority: Medium     Loss of height 08/04/2003     Priority: Medium     Family history of other endocrine and metabolic diseases 08/04/2003     Priority: Medium     Problem list name updated by automated process. Provider to review and confirm       Flatulence, eructation, and gas pain 08/04/2003     Priority: Medium     Migraine 08/04/2003     Priority: Medium     Problem list name updated by automated process. Provider to review        Past Medical History:   Diagnosis Date     Abnormal Pap smear, can't excl hi gd sq intraepithelial lesion (ASC-H) 09/29/11     Arthritis     mother     Cancer (H)     prostate- father     Complication of anesthesia     Gets tachycardic from epi in Dental numbing injections     Depressive disorder     sisters     GERD (gastroesophageal reflux disease)      Heart disease     father     High cholesterol      Hypertension     father     Incontinence of urine in female      Pelvic floor relaxation      Past Surgical History:   Procedure Laterality Date     BREAST LUMPECTOMY, RT/LT  1978    lt fibroadenoma     COLONOSCOPY       COLONOSCOPY Left 9/26/2019    Procedure: COLONOSCOPY;  Surgeon: Renita Pablo MD;  Location:  GI     ESOPHAGOSCOPY,  GASTROSCOPY, DUODENOSCOPY (EGD), COMBINED N/A 2016    Procedure: COMBINED ESOPHAGOSCOPY, GASTROSCOPY, DUODENOSCOPY (EGD);  Surgeon: Feroz Ramirez MD;  Location:  GI     ORTHOPEDIC SURGERY Right     rotator cuff repair     TONSILLECTOMY & ADENOIDECTOMY       Current Outpatient Medications   Medication Sig Dispense Refill     Cholecalciferol (VITAMIN D) 1000 UNIT capsule Take 2 capsules by mouth daily.        conjugated estrogens (PREMARIN) 0.625 MG/GM vaginal cream Place 0.5 g vaginally twice a week 30 g 11     hydrochlorothiazide (MICROZIDE) 12.5 MG capsule Take 1 capsule (12.5 mg) by mouth daily 90 capsule 0     Loratadine (CLARITIN PO) Take 10 mg by mouth daily as needed        melatonin 3 MG tablet Take 3 mg by mouth nightly as needed for sleep       metFORMIN (GLUCOPHAGE-XR) 500 MG 24 hr tablet Take 1 tablet (500 mg) by mouth daily (with dinner) 90 tablet 1     Multiple Vitamins-Minerals (MULTIVITAMIN ADULT PO) Take 1 tablet by mouth daily        omeprazole (PRILOSEC) 20 MG DR capsule Take 1 capsule (20 mg) by mouth daily 90 capsule 2     Probiotic Product (PROBIOTIC PO) Take by mouth daily        RANITIDINE HCL PO Take 150 mg by mouth At Bedtime        simvastatin (ZOCOR) 20 MG tablet Take 1 tablet (20 mg) by mouth At Bedtime at bedtime. 90 tablet 0       Allergies   Allergen Reactions     Lisinopril Cough     Thimerosal      preservative in contact solution -        Social History     Tobacco Use     Smoking status: Former Smoker     Packs/day: 0.00     Years: 0.00     Pack years: 0.00     Quit date: 1980     Years since quittin.9     Smokeless tobacco: Never Used   Substance Use Topics     Alcohol use: Yes     Comment: 5 a week     Family History   Problem Relation Age of Onset     Hypertension Father      Prostate Cancer Father      Eye Disorder Father         macular degeneration     Allergies Mother         nuts     Arthritis Mother      Osteoporosis Mother      Obesity Sister       "Gynecology Sister         polycystic ovaries     Diabetes Nephew         type 2     Anxiety Disorder Daughter      History   Drug Use No         Objective     /74   Pulse 72   Temp 98  F (36.7  C) (Oral)   Ht 1.632 m (5' 4.25\")   Wt 73.9 kg (163 lb)   LMP 10/05/2012   SpO2 99%   BMI 27.76 kg/m      Physical Exam    GENERAL APPEARANCE: healthy, alert and no distress     EYES: Eyes grossly normal to inspection     HENT: ear canals and TM's normal and nose and mouth without ulcers or lesions     NECK: no adenopathy, no asymmetry, masses, or scars and thyroid normal to palpation     RESP: lungs clear to auscultation - no rales, rhonchi or wheezes     CV: regular rates and rhythm, normal S1 S2, no S3 or S4 and no murmur, click or rub     ABDOMEN:  soft, nontender, no HSM or masses and bowel sounds normal     NEURO: Normal strength and tone, sensory exam grossly normal, mentation intact and speech normal     PSYCH: mentation appears normal. and affect normal/bright     LYMPHATICS: No cervical adenopathy    Recent Labs   Lab Test 10/07/20  0905 08/15/19  0819   HGB 12.9 13.5    246    139   POTASSIUM 4.2 4.1   CR 0.70 0.67   A1C 5.3 5.3      Hemoglobin   Date Value Ref Range Status   12/11/2020 12.6 11.7 - 15.7 g/dL Final       Diagnostics:  Labs pending at this time.  Results will be reviewed when available.   EKG required for hypertension and not completed in the last 90 days.     Revised Cardiac Risk Index (RCRI):  The patient has the following serious cardiovascular risks for perioperative complications:   - No serious cardiac risks = 0 points     RCRI Interpretation: 0 points: Class I (very low risk - 0.4% complication rate)           Assessment & Plan   The proposed surgical procedure is considered INTERMEDIATE risk.    Preop general physical exam  - Hemoglobin  - Basic metabolic panel    Weakness of pelvic floor    Hyperlipidemia LDL goal <130    Elevated blood pressure reading without " diagnosis of hypertension  - EKG 12-lead complete w/read - Clinics       Risks and Recommendations:  The patient has the following additional risks and recommendations for perioperative complications:   - No identified additional risk factors other than previously addressed    Hold metformin 24 hours prior to procedure    RECOMMENDATION:  APPROVAL GIVEN to proceed with proposed procedure, without further diagnostic evaluation.    BMP PENDING    Signed Electronically by: ELI España Ra, CNP    Copy of this evaluation report is provided to requesting physician.    Preop [facility] Preop Guidelines    Revised Cardiac Risk Index

## 2020-12-11 NOTE — PATIENT INSTRUCTIONS

## 2020-12-12 LAB
ANION GAP SERPL CALCULATED.3IONS-SCNC: 5 MMOL/L (ref 3–14)
BUN SERPL-MCNC: 19 MG/DL (ref 7–30)
CALCIUM SERPL-MCNC: 9 MG/DL (ref 8.5–10.1)
CHLORIDE SERPL-SCNC: 104 MMOL/L (ref 94–109)
CO2 SERPL-SCNC: 29 MMOL/L (ref 20–32)
CREAT SERPL-MCNC: 0.69 MG/DL (ref 0.52–1.04)
GFR SERPL CREATININE-BSD FRML MDRD: >90 ML/MIN/{1.73_M2}
GLUCOSE SERPL-MCNC: 86 MG/DL (ref 70–99)
POTASSIUM SERPL-SCNC: 4.1 MMOL/L (ref 3.4–5.3)
SARS-COV-2 RNA SPEC QL NAA+PROBE: NOT DETECTED
SODIUM SERPL-SCNC: 138 MMOL/L (ref 133–144)
SPECIMEN SOURCE: NORMAL

## 2020-12-14 ENCOUNTER — ANESTHESIA (OUTPATIENT)
Dept: SURGERY | Facility: CLINIC | Age: 65
End: 2020-12-14
Payer: COMMERCIAL

## 2020-12-14 ENCOUNTER — ANESTHESIA EVENT (OUTPATIENT)
Dept: SURGERY | Facility: CLINIC | Age: 65
End: 2020-12-14
Payer: COMMERCIAL

## 2020-12-14 ENCOUNTER — HOSPITAL ENCOUNTER (OUTPATIENT)
Facility: CLINIC | Age: 65
Discharge: HOME | End: 2020-12-15
Attending: OBSTETRICS & GYNECOLOGY | Admitting: OBSTETRICS & GYNECOLOGY
Payer: COMMERCIAL

## 2020-12-14 DIAGNOSIS — Z98.890 POSTOPERATIVE STATE: Primary | ICD-10-CM

## 2020-12-14 PROCEDURE — 250N000011 HC RX IP 250 OP 636: Performed by: OBSTETRICS & GYNECOLOGY

## 2020-12-14 PROCEDURE — 370N000002 HC ANESTHESIA TECHNICAL FEE, EACH ADDTL 15 MIN: Performed by: OBSTETRICS & GYNECOLOGY

## 2020-12-14 PROCEDURE — 250N000011 HC RX IP 250 OP 636: Performed by: NURSE ANESTHETIST, CERTIFIED REGISTERED

## 2020-12-14 PROCEDURE — 250N000011 HC RX IP 250 OP 636: Performed by: ANESTHESIOLOGY

## 2020-12-14 PROCEDURE — 258N000003 HC RX IP 258 OP 636: Performed by: OBSTETRICS & GYNECOLOGY

## 2020-12-14 PROCEDURE — 250N000009 HC RX 250: Performed by: NURSE ANESTHETIST, CERTIFIED REGISTERED

## 2020-12-14 PROCEDURE — 250N000011 HC RX IP 250 OP 636

## 2020-12-14 PROCEDURE — 250N000009 HC RX 250

## 2020-12-14 PROCEDURE — 761N000001 HC RECOVERY PHASE 1 LEVEL 1 FIRST HR: Performed by: OBSTETRICS & GYNECOLOGY

## 2020-12-14 PROCEDURE — 88305 TISSUE EXAM BY PATHOLOGIST: CPT | Mod: TC | Performed by: OBSTETRICS & GYNECOLOGY

## 2020-12-14 PROCEDURE — 360N000069 HC SURGERY LEVEL 8 EA 15 ADDTL MIN: Performed by: OBSTETRICS & GYNECOLOGY

## 2020-12-14 PROCEDURE — 360N000068 HC SURGERY LEVEL 8 1ST 30 MIN: Performed by: OBSTETRICS & GYNECOLOGY

## 2020-12-14 PROCEDURE — C1771 REP DEV, URINARY, W/SLING: HCPCS | Performed by: OBSTETRICS & GYNECOLOGY

## 2020-12-14 PROCEDURE — 999N000136 HC STATISTIC PRE PROC ASSESS II: Performed by: OBSTETRICS & GYNECOLOGY

## 2020-12-14 PROCEDURE — 250N000009 HC RX 250: Performed by: OBSTETRICS & GYNECOLOGY

## 2020-12-14 PROCEDURE — 88305 TISSUE EXAM BY PATHOLOGIST: CPT | Mod: 26 | Performed by: PATHOLOGY

## 2020-12-14 PROCEDURE — 272N000001 HC OR GENERAL SUPPLY STERILE: Performed by: OBSTETRICS & GYNECOLOGY

## 2020-12-14 PROCEDURE — C1781 MESH (IMPLANTABLE): HCPCS | Performed by: OBSTETRICS & GYNECOLOGY

## 2020-12-14 PROCEDURE — 370N000001 HC ANESTHESIA TECHNICAL FEE, 1ST 30 MIN: Performed by: OBSTETRICS & GYNECOLOGY

## 2020-12-14 PROCEDURE — 258N000003 HC RX IP 258 OP 636: Performed by: ANESTHESIOLOGY

## 2020-12-14 DEVICE — MESH SLING ADVANTAGE MID URETHERAL BLUE M0068502120: Type: IMPLANTABLE DEVICE | Site: ABDOMEN | Status: FUNCTIONAL

## 2020-12-14 DEVICE — MESH SLING PROLAPSE POLYFORM SYNTH 10X15CM M0068402580: Type: IMPLANTABLE DEVICE | Site: ABDOMEN | Status: FUNCTIONAL

## 2020-12-14 RX ORDER — CIPROFLOXACIN 250 MG/1
250 TABLET, FILM COATED ORAL 2 TIMES DAILY
Qty: 14 TABLET | Refills: 0 | Status: SHIPPED | OUTPATIENT
Start: 2020-12-14 | End: 2020-12-21

## 2020-12-14 RX ORDER — NALOXONE HYDROCHLORIDE 0.4 MG/ML
0.2 INJECTION, SOLUTION INTRAMUSCULAR; INTRAVENOUS; SUBCUTANEOUS
Status: DISCONTINUED | OUTPATIENT
Start: 2020-12-14 | End: 2020-12-14 | Stop reason: HOSPADM

## 2020-12-14 RX ORDER — MEPERIDINE HYDROCHLORIDE 25 MG/ML
12.5 INJECTION INTRAMUSCULAR; INTRAVENOUS; SUBCUTANEOUS
Status: DISCONTINUED | OUTPATIENT
Start: 2020-12-14 | End: 2020-12-14 | Stop reason: HOSPADM

## 2020-12-14 RX ORDER — SODIUM CHLORIDE, SODIUM LACTATE, POTASSIUM CHLORIDE, CALCIUM CHLORIDE 600; 310; 30; 20 MG/100ML; MG/100ML; MG/100ML; MG/100ML
INJECTION, SOLUTION INTRAVENOUS CONTINUOUS
Status: DISCONTINUED | OUTPATIENT
Start: 2020-12-14 | End: 2020-12-14 | Stop reason: HOSPADM

## 2020-12-14 RX ORDER — ACETAMINOPHEN 325 MG/1
975 TABLET ORAL ONCE
Status: DISCONTINUED | OUTPATIENT
Start: 2020-12-14 | End: 2020-12-14 | Stop reason: HOSPADM

## 2020-12-14 RX ORDER — PROPOFOL 10 MG/ML
INJECTION, EMULSION INTRAVENOUS PRN
Status: DISCONTINUED | OUTPATIENT
Start: 2020-12-14 | End: 2020-12-14

## 2020-12-14 RX ORDER — LIDOCAINE 40 MG/G
CREAM TOPICAL
Status: DISCONTINUED | OUTPATIENT
Start: 2020-12-14 | End: 2020-12-14 | Stop reason: HOSPADM

## 2020-12-14 RX ORDER — HYDROMORPHONE HYDROCHLORIDE 2 MG/1
2 TABLET ORAL EVERY 6 HOURS PRN
Qty: 30 TABLET | Refills: 0 | Status: SHIPPED | OUTPATIENT
Start: 2020-12-14 | End: 2020-12-17

## 2020-12-14 RX ORDER — LIDOCAINE HYDROCHLORIDE 10 MG/ML
INJECTION, SOLUTION INFILTRATION; PERINEURAL PRN
Status: DISCONTINUED | OUTPATIENT
Start: 2020-12-14 | End: 2020-12-14

## 2020-12-14 RX ORDER — KETAMINE HYDROCHLORIDE 10 MG/ML
INJECTION INTRAMUSCULAR; INTRAVENOUS PRN
Status: DISCONTINUED | OUTPATIENT
Start: 2020-12-14 | End: 2020-12-14

## 2020-12-14 RX ORDER — NALOXONE HYDROCHLORIDE 0.4 MG/ML
0.4 INJECTION, SOLUTION INTRAMUSCULAR; INTRAVENOUS; SUBCUTANEOUS
Status: DISCONTINUED | OUTPATIENT
Start: 2020-12-14 | End: 2020-12-14 | Stop reason: HOSPADM

## 2020-12-14 RX ORDER — HYDROMORPHONE HYDROCHLORIDE 1 MG/ML
0.2 INJECTION, SOLUTION INTRAMUSCULAR; INTRAVENOUS; SUBCUTANEOUS
Status: DISCONTINUED | OUTPATIENT
Start: 2020-12-14 | End: 2020-12-15 | Stop reason: HOSPADM

## 2020-12-14 RX ORDER — KETOROLAC TROMETHAMINE 30 MG/ML
INJECTION, SOLUTION INTRAMUSCULAR; INTRAVENOUS PRN
Status: DISCONTINUED | OUTPATIENT
Start: 2020-12-14 | End: 2020-12-14

## 2020-12-14 RX ORDER — CEFAZOLIN SODIUM 2 G/100ML
2 INJECTION, SOLUTION INTRAVENOUS
Status: COMPLETED | OUTPATIENT
Start: 2020-12-14 | End: 2020-12-14

## 2020-12-14 RX ORDER — ONDANSETRON 2 MG/ML
4 INJECTION INTRAMUSCULAR; INTRAVENOUS EVERY 30 MIN PRN
Status: DISCONTINUED | OUTPATIENT
Start: 2020-12-14 | End: 2020-12-14 | Stop reason: HOSPADM

## 2020-12-14 RX ORDER — POLYETHYLENE GLYCOL 3350 17 G/17G
17 POWDER, FOR SOLUTION ORAL DAILY
Qty: 510 G | Refills: 0 | Status: SHIPPED | OUTPATIENT
Start: 2020-12-14 | End: 2021-11-16

## 2020-12-14 RX ORDER — DIPHENHYDRAMINE HCL 25 MG
25 CAPSULE ORAL EVERY 6 HOURS PRN
Status: DISCONTINUED | OUTPATIENT
Start: 2020-12-14 | End: 2020-12-15 | Stop reason: HOSPADM

## 2020-12-14 RX ORDER — DIPHENHYDRAMINE HYDROCHLORIDE 50 MG/ML
25 INJECTION INTRAMUSCULAR; INTRAVENOUS EVERY 6 HOURS PRN
Status: DISCONTINUED | OUTPATIENT
Start: 2020-12-14 | End: 2020-12-15 | Stop reason: HOSPADM

## 2020-12-14 RX ORDER — FENTANYL CITRATE 50 UG/ML
INJECTION, SOLUTION INTRAMUSCULAR; INTRAVENOUS PRN
Status: DISCONTINUED | OUTPATIENT
Start: 2020-12-14 | End: 2020-12-14

## 2020-12-14 RX ORDER — ALBUTEROL SULFATE 0.83 MG/ML
2.5 SOLUTION RESPIRATORY (INHALATION) EVERY 4 HOURS PRN
Status: DISCONTINUED | OUTPATIENT
Start: 2020-12-14 | End: 2020-12-14 | Stop reason: HOSPADM

## 2020-12-14 RX ORDER — IBUPROFEN 600 MG/1
600 TABLET, FILM COATED ORAL EVERY 6 HOURS PRN
Qty: 30 TABLET | Refills: 0 | Status: SHIPPED | OUTPATIENT
Start: 2020-12-14 | End: 2023-10-18

## 2020-12-14 RX ORDER — ONDANSETRON 4 MG/1
4 TABLET, ORALLY DISINTEGRATING ORAL EVERY 30 MIN PRN
Status: DISCONTINUED | OUTPATIENT
Start: 2020-12-14 | End: 2020-12-14 | Stop reason: HOSPADM

## 2020-12-14 RX ORDER — FAMOTIDINE 20 MG/1
20 TABLET, FILM COATED ORAL 2 TIMES DAILY
Status: DISCONTINUED | OUTPATIENT
Start: 2020-12-15 | End: 2020-12-15 | Stop reason: HOSPADM

## 2020-12-14 RX ORDER — NEOSTIGMINE METHYLSULFATE 1 MG/ML
VIAL (ML) INJECTION PRN
Status: DISCONTINUED | OUTPATIENT
Start: 2020-12-14 | End: 2020-12-14

## 2020-12-14 RX ORDER — ONDANSETRON 2 MG/ML
4 INJECTION INTRAMUSCULAR; INTRAVENOUS EVERY 6 HOURS PRN
Status: DISCONTINUED | OUTPATIENT
Start: 2020-12-14 | End: 2020-12-15 | Stop reason: HOSPADM

## 2020-12-14 RX ORDER — ONDANSETRON 4 MG/1
4 TABLET, ORALLY DISINTEGRATING ORAL EVERY 6 HOURS PRN
Status: DISCONTINUED | OUTPATIENT
Start: 2020-12-14 | End: 2020-12-15 | Stop reason: HOSPADM

## 2020-12-14 RX ORDER — GLYCOPYRROLATE 0.2 MG/ML
INJECTION, SOLUTION INTRAMUSCULAR; INTRAVENOUS PRN
Status: DISCONTINUED | OUTPATIENT
Start: 2020-12-14 | End: 2020-12-14

## 2020-12-14 RX ORDER — PROCHLORPERAZINE MALEATE 10 MG
10 TABLET ORAL EVERY 6 HOURS PRN
Status: DISCONTINUED | OUTPATIENT
Start: 2020-12-14 | End: 2020-12-15 | Stop reason: HOSPADM

## 2020-12-14 RX ORDER — HYDROCHLOROTHIAZIDE 12.5 MG/1
12.5 CAPSULE ORAL DAILY
Status: DISCONTINUED | OUTPATIENT
Start: 2020-12-15 | End: 2020-12-15 | Stop reason: HOSPADM

## 2020-12-14 RX ORDER — PROPOFOL 10 MG/ML
INJECTION, EMULSION INTRAVENOUS CONTINUOUS PRN
Status: DISCONTINUED | OUTPATIENT
Start: 2020-12-14 | End: 2020-12-14

## 2020-12-14 RX ORDER — CEFAZOLIN SODIUM 1 G/3ML
1 INJECTION, POWDER, FOR SOLUTION INTRAMUSCULAR; INTRAVENOUS SEE ADMIN INSTRUCTIONS
Status: DISCONTINUED | OUTPATIENT
Start: 2020-12-14 | End: 2020-12-14 | Stop reason: HOSPADM

## 2020-12-14 RX ORDER — ONDANSETRON 2 MG/ML
INJECTION INTRAMUSCULAR; INTRAVENOUS PRN
Status: DISCONTINUED | OUTPATIENT
Start: 2020-12-14 | End: 2020-12-14

## 2020-12-14 RX ORDER — KETOROLAC TROMETHAMINE 30 MG/ML
30 INJECTION, SOLUTION INTRAMUSCULAR; INTRAVENOUS EVERY 6 HOURS
Status: DISCONTINUED | OUTPATIENT
Start: 2020-12-14 | End: 2020-12-15 | Stop reason: HOSPADM

## 2020-12-14 RX ORDER — FENTANYL CITRATE 50 UG/ML
25-50 INJECTION, SOLUTION INTRAMUSCULAR; INTRAVENOUS
Status: DISCONTINUED | OUTPATIENT
Start: 2020-12-14 | End: 2020-12-14 | Stop reason: HOSPADM

## 2020-12-14 RX ORDER — SODIUM CHLORIDE 9 MG/ML
INJECTION, SOLUTION INTRAVENOUS CONTINUOUS
Status: DISCONTINUED | OUTPATIENT
Start: 2020-12-14 | End: 2020-12-15 | Stop reason: HOSPADM

## 2020-12-14 RX ORDER — DEXAMETHASONE SODIUM PHOSPHATE 4 MG/ML
INJECTION, SOLUTION INTRA-ARTICULAR; INTRALESIONAL; INTRAMUSCULAR; INTRAVENOUS; SOFT TISSUE PRN
Status: DISCONTINUED | OUTPATIENT
Start: 2020-12-14 | End: 2020-12-14

## 2020-12-14 RX ORDER — HYDROMORPHONE HYDROCHLORIDE 2 MG/1
2-4 TABLET ORAL
Status: DISCONTINUED | OUTPATIENT
Start: 2020-12-14 | End: 2020-12-15 | Stop reason: HOSPADM

## 2020-12-14 RX ORDER — POLYETHYLENE GLYCOL 3350 17 G/17G
17 POWDER, FOR SOLUTION ORAL DAILY
Status: DISCONTINUED | OUTPATIENT
Start: 2020-12-15 | End: 2020-12-15 | Stop reason: HOSPADM

## 2020-12-14 RX ORDER — METOPROLOL TARTRATE 1 MG/ML
1-2 INJECTION, SOLUTION INTRAVENOUS EVERY 5 MIN PRN
Status: DISCONTINUED | OUTPATIENT
Start: 2020-12-14 | End: 2020-12-14 | Stop reason: HOSPADM

## 2020-12-14 RX ORDER — EPHEDRINE SULFATE 50 MG/ML
INJECTION, SOLUTION INTRAMUSCULAR; INTRAVENOUS; SUBCUTANEOUS PRN
Status: DISCONTINUED | OUTPATIENT
Start: 2020-12-14 | End: 2020-12-14

## 2020-12-14 RX ORDER — HYDROMORPHONE HYDROCHLORIDE 1 MG/ML
.3-.5 INJECTION, SOLUTION INTRAMUSCULAR; INTRAVENOUS; SUBCUTANEOUS EVERY 10 MIN PRN
Status: DISCONTINUED | OUTPATIENT
Start: 2020-12-14 | End: 2020-12-14 | Stop reason: HOSPADM

## 2020-12-14 RX ORDER — PHENAZOPYRIDINE HYDROCHLORIDE 200 MG/1
200 TABLET, FILM COATED ORAL ONCE
Status: DISCONTINUED | OUTPATIENT
Start: 2020-12-14 | End: 2020-12-14 | Stop reason: HOSPADM

## 2020-12-14 RX ADMIN — GLYCOPYRROLATE 0.7 MG: 0.2 INJECTION, SOLUTION INTRAMUSCULAR; INTRAVENOUS at 18:40

## 2020-12-14 RX ADMIN — HYDROMORPHONE HYDROCHLORIDE 0.5 MG: 1 INJECTION, SOLUTION INTRAMUSCULAR; INTRAVENOUS; SUBCUTANEOUS at 15:53

## 2020-12-14 RX ADMIN — ROCURONIUM BROMIDE 40 MG: 10 INJECTION INTRAVENOUS at 15:24

## 2020-12-14 RX ADMIN — FENTANYL CITRATE 25 MCG: 50 INJECTION, SOLUTION INTRAMUSCULAR; INTRAVENOUS at 19:18

## 2020-12-14 RX ADMIN — TAZOBACTAM SODIUM AND PIPERACILLIN SODIUM 3.38 G: 375; 3 INJECTION, SOLUTION INTRAVENOUS at 22:15

## 2020-12-14 RX ADMIN — ONDANSETRON HYDROCHLORIDE 4 MG: 2 INJECTION, SOLUTION INTRAVENOUS at 15:41

## 2020-12-14 RX ADMIN — LIDOCAINE HYDROCHLORIDE 30 MG: 10 INJECTION, SOLUTION INFILTRATION; PERINEURAL at 15:24

## 2020-12-14 RX ADMIN — KETOROLAC TROMETHAMINE 30 MG: 30 INJECTION, SOLUTION INTRAMUSCULAR at 18:47

## 2020-12-14 RX ADMIN — CEFAZOLIN SODIUM 1 G: 2 INJECTION, SOLUTION INTRAVENOUS at 17:16

## 2020-12-14 RX ADMIN — MIDAZOLAM 2 MG: 1 INJECTION INTRAMUSCULAR; INTRAVENOUS at 15:15

## 2020-12-14 RX ADMIN — SODIUM CHLORIDE, POTASSIUM CHLORIDE, SODIUM LACTATE AND CALCIUM CHLORIDE: 600; 310; 30; 20 INJECTION, SOLUTION INTRAVENOUS at 18:58

## 2020-12-14 RX ADMIN — SODIUM CHLORIDE, POTASSIUM CHLORIDE, SODIUM LACTATE AND CALCIUM CHLORIDE: 600; 310; 30; 20 INJECTION, SOLUTION INTRAVENOUS at 15:15

## 2020-12-14 RX ADMIN — HYDROMORPHONE HYDROCHLORIDE 0.5 MG: 1 INJECTION, SOLUTION INTRAMUSCULAR; INTRAVENOUS; SUBCUTANEOUS at 17:50

## 2020-12-14 RX ADMIN — SODIUM CHLORIDE: 9 INJECTION, SOLUTION INTRAVENOUS at 21:52

## 2020-12-14 RX ADMIN — ROCURONIUM BROMIDE 10 MG: 10 INJECTION INTRAVENOUS at 16:28

## 2020-12-14 RX ADMIN — SODIUM CHLORIDE, POTASSIUM CHLORIDE, SODIUM LACTATE AND CALCIUM CHLORIDE: 600; 310; 30; 20 INJECTION, SOLUTION INTRAVENOUS at 15:59

## 2020-12-14 RX ADMIN — DEXAMETHASONE SODIUM PHOSPHATE 8 MG: 4 INJECTION, SOLUTION INTRA-ARTICULAR; INTRALESIONAL; INTRAMUSCULAR; INTRAVENOUS; SOFT TISSUE at 15:41

## 2020-12-14 RX ADMIN — Medication 5 MG: at 15:39

## 2020-12-14 RX ADMIN — FENTANYL CITRATE 100 MCG: 50 INJECTION, SOLUTION INTRAMUSCULAR; INTRAVENOUS at 15:24

## 2020-12-14 RX ADMIN — ROCURONIUM BROMIDE 20 MG: 10 INJECTION INTRAVENOUS at 17:17

## 2020-12-14 RX ADMIN — FENTANYL CITRATE 50 MCG: 50 INJECTION, SOLUTION INTRAMUSCULAR; INTRAVENOUS at 19:14

## 2020-12-14 RX ADMIN — PROPOFOL 150 MG: 10 INJECTION, EMULSION INTRAVENOUS at 15:24

## 2020-12-14 RX ADMIN — CEFAZOLIN SODIUM 2 G: 2 INJECTION, SOLUTION INTRAVENOUS at 15:27

## 2020-12-14 RX ADMIN — Medication 5 MG: at 18:40

## 2020-12-14 RX ADMIN — Medication 20 MG: at 15:56

## 2020-12-14 RX ADMIN — Medication 10 MG: at 17:47

## 2020-12-14 RX ADMIN — PROPOFOL 40 MCG/KG/MIN: 10 INJECTION, EMULSION INTRAVENOUS at 15:51

## 2020-12-14 RX ADMIN — Medication 5 MG: at 18:03

## 2020-12-14 RX ADMIN — HYDROMORPHONE HYDROCHLORIDE 0.5 MG: 1 INJECTION, SOLUTION INTRAMUSCULAR; INTRAVENOUS; SUBCUTANEOUS at 15:45

## 2020-12-14 RX ADMIN — HYDROMORPHONE HYDROCHLORIDE 0.5 MG: 1 INJECTION, SOLUTION INTRAMUSCULAR; INTRAVENOUS; SUBCUTANEOUS at 18:06

## 2020-12-14 RX ADMIN — ROCURONIUM BROMIDE 15 MG: 10 INJECTION INTRAVENOUS at 16:45

## 2020-12-14 SDOH — HEALTH STABILITY: MENTAL HEALTH: CURRENT SMOKER: 0

## 2020-12-14 ASSESSMENT — MIFFLIN-ST. JEOR: SCORE: 1283.79

## 2020-12-14 NOTE — PHARMACY-ADMISSION MEDICATION HISTORY
PTA meds completed by pre-admitting nurse Carmen Gifford RN and reviewed by pharmacy   See [facility] admission navigator for allergy information, prior to admission medications and immunization status.     Prior to Admission medications    Medication Sig Last Dose Taking? Auth Provider   Cholecalciferol (VITAMIN D) 1000 UNIT capsule Take 2 capsules by mouth daily.   Yes Reported, Patient   conjugated estrogens (PREMARIN) 0.625 MG/GM vaginal cream Place 0.5 g vaginally twice a week  Yes Sadaf Polanco MD   hydrochlorothiazide (MICROZIDE) 12.5 MG capsule Take 1 capsule (12.5 mg) by mouth daily  Yes Sadaf Polanco MD   Loratadine (CLARITIN PO) Take 10 mg by mouth daily as needed   Yes Reported, Patient   melatonin 3 MG tablet Take 3 mg by mouth nightly as needed for sleep  Yes Reported, Patient   metFORMIN (GLUCOPHAGE-XR) 500 MG 24 hr tablet Take 1 tablet (500 mg) by mouth daily (with dinner)  Yes Sadaf Polanco MD   Multiple Vitamins-Minerals (MULTIVITAMIN ADULT PO) Take 1 tablet by mouth daily  11/29/2020 Yes Reported, Patient   omeprazole (PRILOSEC) 20 MG DR capsule Take 1 capsule (20 mg) by mouth daily  Yes Sadaf Polanco MD   Probiotic Product (PROBIOTIC PO) Take by mouth daily  11/29/2020 Yes Reported, Patient   RANITIDINE HCL PO Take 150 mg by mouth At Bedtime   Yes Reported, Patient   simvastatin (ZOCOR) 20 MG tablet Take 1 tablet (20 mg) by mouth At Bedtime at bedtime.  Yes Sadaf Polanco MD

## 2020-12-14 NOTE — OP NOTE
OPERATIVE REPORT    NAME: ERIK LOPEZ  MR#: [id number]  : 1955    DATE OF OPERATION: 2020    SURGEON: Gail Yang MD    PREOPERATIVE DIAGNOSES:  1. Incomplete uterovaginal prolapse.  2. Associated cystocele, rectocele and enterocele  3. Stress urinary incontinence with ISD    POSTOPERATIVE DIAGNOSES:  1. Incomplete uterovaginal prolapse.  2. Associated cystocele, rectocele and enterocele  3. Stress urinary incontinence with ISD    PROCEDURE:  1. Da Kamryn laparoscopic sacral colpopexy  2. Da Kamryn laparoscopic supracervical hysterectomy  3. Da Kamryn laparoscopic bilateral salpingo-oophorectomy  4. Da Kamryn laparoscopic abdominal enterocele repair  5. Retropubic midurethral sling  6. Cystourethroscopy  7. Rectocele repair    ASSISTANT:  ANUPAMA Rojas    ANESTHESIA:  General endotracheal.    ESTIMATED BLOOD LOSS:  100 ml    IV FLUIDS:  1700 ml crystalloid    FINDINGS:  1. The bladder was found to be free of lesion. Ureters were in their normal anatomic positions, were noted to be patent via administration of dye.  2. The ovaries were normal appearing- resected per surgical plan.  3. Normal anorectal examination at the conclusion of the procedure.    DRAINS:  16-[place] Taylor catheter to gravity drainage.    PACKING:  Saline-soaked vaginal packing placed.    COMPLICATIONS:  None.    INDICATIONS :  This patient was seen in consultation regarding uterovaginal prolapse and urinary incontinence . Please refer to her clinic documentation for a complete description of her evaluation and treatment plan. She was desirous of a definitive surgical approach. Prior to the procedure the risks, benefits, indications, and alternatives were discussed. Written and verbal consent were obtained.    PROCEDURE IN DETAIL:  The patient was brought to the operating suite. She was administered prophylactic IV antibiotics, had sequential compression devices present and functioning on her lower  extremities.    She was placed in a supine position, administered general endotracheal anesthesia without complication. She was now carefully positioned in the dorsal lithotomy position with her legs carefully stationed in Yellofin stirrups, with attention to avoiding pressure points. An exam under anesthesia was performed with noted relaxation, no adnexal or parametrial masses, normal anorectal exam. She was now sterilely prepped and draped both abdominally and vaginally, and an 18-[place] Taylor catheter was placed to gravity drainage.    Laparoscopic entry:  At the base of the umbilicus, a 10 mm incision was created. The veress needle was inserted and the abdomen insuflated to 15 mmg/hg. The 10 mm trocar was inserted. Inspection of the intra-abdominal cavity showed there to be no lesions. She was placed in steep Trendelenburg position and 4 additional laparoscopic ports were placed 8 mm mid right quadrant, 8 mm far right quadrant, 8 mm mid left quadrant, and 8 mm far left quadrant. The da Kamryn robotic arms were brought to the patient's bedside and operative control was assumed at the console.    Bilateral Salpingo-oophorectomy:  The right infundibulo-pelvic ligament was elevated. A peritoneal window was created below the ligament and above the level of the visualized ureter. The right IP ligament was then cauterized. The adnexa was grasped, the peritoneum was cauterized mobilizing and the specimen. This was repeated on the left side in-a similar fashion. The specimens were left attached to the uterus.    Supracervical hysterectomy:  The left round ligament was grasped, cauterized, and incised. The anterior broad ligament was then scored opened. The utero-ovarian pedicle was now cauterized and incised. The broad ligament was further dissected using cautery. The uterine vessels were visualized and cauterized. Anteriorly, a peritoneal bladder flap had been developed transversely and dissected down past the external  cervical os. This procedure was then repeated in a similar fashion on the patient's right side. At this point, the specimen was truncated from the residual cervix leaving 1-2 cm of residual cervical tissue. The specimen was tagged for later removal.    Sacral colpopexy:  A Lucite probe was placed within the vaginal canal. Anteriorly, the bladder was dissected down the anterior vaginal muscularis approximately 9 cm sagitally. Posteriorly, the peritoneum was dissected off the posterior rectovaginal septum and dissected down to the rectovaginal septum, approximately 11 cm sagitally, as close to the perineal body as possible.    Sacral dissection:  At the sacral promontory the right ureter was noted to be lateral to the area of dissection. The peritoneum was elevated and incised. The peritoneal incision was taken down around the pelvic curvature to meet up with the posterior vaginal dissection. The peritoneal edges were carefully mobilized for future closure. At the promontory the connective tissue was dissected down to the anterior longitudinal ligament. The middle sacral vessel was cauterized.    Mesh Attachment.  A 5 cm x 15 cm piece of [employer] Polyform polypropylene mesh was attached to the anterior vaginal muscularis using approximately 9 interrupted sutures of 2-0 PDS. An identical sheet of mesh was attached to the posterior vaginal wall using approximately 9 interrupted sutures of 2-0 PDS. The long arms of the mesh were now brought to the sacral promontory and attached to the anterior longitudinal ligament using 3 interrupted sutures of 0 [place]. Appropriate tensioning was ascertained using visual and palpating clues. Redundant longitudinal mesh was trimmed and the resultant peritoneum was closed with monocryl suture, thus retroperitonealizing the mesh repair.    Abdominal Enterocele Repair  Using a Halban technique, the deep pelvis was closed/obliterated using 2-0 PDS suture, imbricating the  peritoneal tissue.    Cystourethroscopy was now performed, which noted patent ureters bilaterally and normal appearing bladder.    Specimen removal:  The uterine/adnexal specimen was now removed from the abdominal cavity through the umbilical incision using an endo-catch bag.    Laparoscopic closure:  The umbilical fascia was closed with 0-PDS. The CO2 gas was allowed to escape from the patient's abdomen, and the resultant 5 skin incisions were closed with a subcuticular suture of 4-0 monocryl, and skin glue was applied.    Retropubic midurethral sling:  Two marks were created on the anterior abdominal wall 2.5 cm lateral to midline at the level of the pubic bone. Attention was turned to the vagina, where an Allis clamp was applied 1 cm distal from the meatus, an additional Allis clamp 2.5 cm from the meatus. Periurethral tissue was injected with a solution of Marcaine with epinephrine. A 1.5 cm incision was created between the 2 Allis clamps beneath the mid urethra in the sagittal plane. Two periurethral tunnels were then created out laterally towards the pubic bone. Once an adequate dissection had been performed, the [email or web address] Advantage Fit device was selected and loaded. Trocar was brought through the patient's left periurethral tunnel back behind the pubic bone, through the space of Retzius, and out through the previously created shani on the anterior abdominal wall. The blue stay sheath was left in place.    This was repeated in a similar fashion on the patient's right side. The urethra was diverted in ipsilateral fashion during trocar placement. Cystourethroscopy was now performed with the above noted normal findings. The cystoscope was removed. The sling material was appropriately positioned beneath the mid urethra with no overt tension. The resultant incision was closed with a running locking suture of 2-0 Vicryl. Excess sling material on the anterior abdominal wall was trimmed. The resultant  incisions were closed with Dermabond.    Rectocele Repair:  The lower aspect of the posterior vaginal canal and the perineal body still demonstrated marked relaxation. The decision was made to proceed with rectocele repair and perineorrhaphy. The perineal body was injected with local anesthetic. A thin wedge resection was performed. The vaginal epithelium was dissected off the underlying rectovaginal tissue. The midline defect was repaired with a midline plication using 0-vicryl suture. The perineal body was mobilized and reconstructed with appropriate support. The epithelium was closed with 2-0 vicryl suture.    The vagina was packed with a saline-soaked vaginal packing. She had a 16-[place] Taylor catheter present to gravity drainage. Sponge, lap, and needle counts were found to be correct. There were no complications from surgery. Patient was awoken from anesthesia, and brought to the recovery room in excellent condition.    Gail Yang MD

## 2020-12-14 NOTE — ANESTHESIA PREPROCEDURE EVALUATION
Anesthesia Pre-Procedure Evaluation    Patient: Brittany Chan   MRN: [id number] : 1955          Preoperative Diagnosis: Uterovaginal prolapse, incomplete [N81.2]  Female stress incontinence [N39.3]  Incomplete defecation [R15.0]  Rectocele [N81.6]    Procedure(s):  Robotic assisted sacrocolpopexy, supracervical hysterectomy with bilateral salpingectomy, abdominal enterocele repair, midurethral sling, cystoscopy, possible posterior repair    Past Medical History:   Diagnosis Date     Abnormal Pap smear, can't excl hi gd sq intraepithelial lesion (ASC-H) 11     Arthritis     mother     Cancer (H)     prostate- father     Complication of anesthesia     Gets tachycardic from epi in Dental numbing injections     Depressive disorder     sisters     GERD (gastroesophageal reflux disease)      Heart disease     father     High cholesterol      Hypertension     father     Incontinence of urine in female      Pelvic floor relaxation      Past Surgical History:   Procedure Laterality Date     BREAST LUMPECTOMY, RT/LT      lt fibroadenoma     COLONOSCOPY       COLONOSCOPY Left 2019    Procedure: COLONOSCOPY;  Surgeon: Renita Pablo MD;  Location:  GI     ESOPHAGOSCOPY, GASTROSCOPY, DUODENOSCOPY (EGD), COMBINED N/A 2016    Procedure: COMBINED ESOPHAGOSCOPY, GASTROSCOPY, DUODENOSCOPY (EGD);  Surgeon: Feroz Ramirez MD;  Location:  GI     ORTHOPEDIC SURGERY Right 2014    rotator cuff repair     TONSILLECTOMY & ADENOIDECTOMY       Anesthesia Evaluation     . Pt has had prior anesthetic. Type: General    No history of anesthetic complications          ROS/MED HX    ENT/Pulmonary:  - neg pulmonary ROS     Neurologic:  - neg neurologic ROS     Cardiovascular:     (+) Dyslipidemia, hypertension----. : . . . :. .       METS/Exercise Tolerance:     Hematologic:  - neg hematologic  ROS       Musculoskeletal:  - neg musculoskeletal ROS       GI/Hepatic:     (+) GERD Asymptomatic on medication,      "  Renal/Genitourinary:  - ROS Renal section negative       Endo:  - neg endo ROS       Psychiatric:  - neg psychiatric ROS       Infectious Disease:  - neg infectious disease ROS       Malignancy:         Other:    - neg other ROS                      Physical Exam  Normal systems: cardiovascular, pulmonary and dental    Airway   Mallampati: II  TM distance: >3 FB  Neck ROM: full    Dental     Cardiovascular       Pulmonary             Lab Results   Component Value Date    WBC 4.1 10/07/2020    HGB 12.6 12/11/2020    HCT 40.3 10/07/2020     10/07/2020     12/11/2020    POTASSIUM 4.1 12/11/2020    CHLORIDE 104 12/11/2020    CO2 29 12/11/2020    BUN 19 12/11/2020    CR 0.69 12/11/2020    GLC 86 12/11/2020    BILL 9.0 12/11/2020    ALBUMIN 3.5 10/07/2020    PROTTOTAL 6.9 10/07/2020    ALT 41 10/07/2020    AST 24 10/07/2020    ALKPHOS 70 10/07/2020    BILITOTAL 0.4 10/07/2020    TSH 0.86 10/07/2020       Preop Vitals  BP Readings from Last 3 Encounters:   12/14/20 (!) 145/70   12/11/20 110/74   10/07/20 118/77    Pulse Readings from Last 3 Encounters:   12/14/20 69   12/11/20 72   10/07/20 73      Resp Readings from Last 3 Encounters:   12/14/20 20   10/07/20 16   09/26/19 16    SpO2 Readings from Last 3 Encounters:   12/14/20 100%   12/11/20 99%   10/07/20 97%      Temp Readings from Last 1 Encounters:   12/14/20 98.2  F (36.8  C) (Temporal)    Ht Readings from Last 1 Encounters:   12/14/20 1.632 m (5' 4.25\")      Wt Readings from Last 1 Encounters:   12/14/20 74 kg (163 lb 1.6 oz)    Estimated body mass index is 27.78 kg/m  as calculated from the following:    Height as of this encounter: 1.632 m (5' 4.25\").    Weight as of this encounter: 74 kg (163 lb 1.6 oz).       Anesthesia Plan      History & Physical Review      ASA Status:  2 .    NPO Status:  > 8 hours    Plan for General with Intravenous induction. Maintenance will be Balanced.    PONV prophylaxis:  Ondansetron (or other 5HT-3) and Dexamethasone " or Solumedrol    The patient is not a current smoker      Postoperative Care  Postoperative pain management:  IV analgesics, Oral pain medications and Multi-modal analgesia.      Consents  Anesthetic plan, risks, benefits and alternatives discussed with:  Patient..                 Al Falk MD                    .

## 2020-12-15 VITALS
OXYGEN SATURATION: 96 % | HEART RATE: 64 BPM | BODY MASS INDEX: 27.84 KG/M2 | DIASTOLIC BLOOD PRESSURE: 61 MMHG | WEIGHT: 163.1 LBS | RESPIRATION RATE: 16 BRPM | SYSTOLIC BLOOD PRESSURE: 117 MMHG | TEMPERATURE: 98.7 F | HEIGHT: 64 IN

## 2020-12-15 LAB — HGB BLD-MCNC: 12.4 G/DL (ref 11.7–15.7)

## 2020-12-15 PROCEDURE — 36415 COLL VENOUS BLD VENIPUNCTURE: CPT | Performed by: OBSTETRICS & GYNECOLOGY

## 2020-12-15 PROCEDURE — 85018 HEMOGLOBIN: CPT | Performed by: OBSTETRICS & GYNECOLOGY

## 2020-12-15 PROCEDURE — 250N000013 HC RX MED GY IP 250 OP 250 PS 637: Performed by: OBSTETRICS & GYNECOLOGY

## 2020-12-15 PROCEDURE — 250N000011 HC RX IP 250 OP 636: Performed by: OBSTETRICS & GYNECOLOGY

## 2020-12-15 RX ORDER — IBUPROFEN 600 MG/1
600 TABLET, FILM COATED ORAL EVERY 6 HOURS PRN
Status: DISCONTINUED | OUTPATIENT
Start: 2020-12-15 | End: 2020-12-15 | Stop reason: HOSPADM

## 2020-12-15 RX ADMIN — HYDROCHLOROTHIAZIDE 12.5 MG: 12.5 CAPSULE ORAL at 08:19

## 2020-12-15 RX ADMIN — KETOROLAC TROMETHAMINE 30 MG: 30 INJECTION, SOLUTION INTRAMUSCULAR at 06:35

## 2020-12-15 RX ADMIN — KETOROLAC TROMETHAMINE 30 MG: 30 INJECTION, SOLUTION INTRAMUSCULAR at 00:53

## 2020-12-15 RX ADMIN — TAZOBACTAM SODIUM AND PIPERACILLIN SODIUM 3.38 G: 375; 3 INJECTION, SOLUTION INTRAVENOUS at 04:20

## 2020-12-15 RX ADMIN — IBUPROFEN 600 MG: 600 TABLET, FILM COATED ORAL at 12:35

## 2020-12-15 RX ADMIN — POLYETHYLENE GLYCOL 3350 17 G: 17 POWDER, FOR SOLUTION ORAL at 08:19

## 2020-12-15 RX ADMIN — TAZOBACTAM SODIUM AND PIPERACILLIN SODIUM 3.38 G: 375; 3 INJECTION, SOLUTION INTRAVENOUS at 10:43

## 2020-12-15 RX ADMIN — OMEPRAZOLE 20 MG: 20 CAPSULE, DELAYED RELEASE ORAL at 08:19

## 2020-12-15 NOTE — PROVIDER NOTIFICATION
12/15/20 1317   Provider Notification   Provider Name/Title Dr. Yang   Method of Notification Phone   Request Evaluate-Remote   Notification Reason Status Update   Updated this MD that patient was able to void without difficulty after voiding trial.  300mls placed into bladder and patient then voided 250mls.  MD states ok to discharge to home today with no mendoza.  Cipro therefore will not be needed. MD requested that writer marcosed Cipro rx.  Patient is aware that follow up will be with Dr. Yang on 12/29 at 0930.

## 2020-12-15 NOTE — PROGRESS NOTES
"UROGYNECOLOGY    POST-OP DAY #1    S: Doing well  Ambulating: no  Diet: liquids  Flatus: no  Pain control: good  Vaginal Pack: in place   Taylor catheter: in place    O:  Vitals: /86   Pulse 81   Temp 98.5  F (36.9  C) (Oral)   Resp 21   Ht 1.632 m (5' 4.25\")   Wt 74 kg (163 lb 1.6 oz)   LMP 10/05/2012   SpO2 100%   BMI 27.78 kg/m    BMI= Body mass index is 27.78 kg/m .      Intake/Output Summary (Last 24 hours) at 12/15/2020 0624  Last data filed at 12/14/2020 2200  Gross per 24 hour   Intake 2500 ml   Output 525 ml   Net 1975 ml       Appears healthy and well, A&O x3  Abdomen is soft, slight bloating, incisions C/D/I, good BS  Ext SCD, no edema  Vaginal packing in place, no perineal edema, incisions intact.    POD#1  A) Post-Operative Care:    ambulate     ADAT    continue with pain control strategies.    perform voiding trial when able to ambulate without assistance.    I reviewed post-operative instructions and precautions/ written information provided.    Discharge home anticipated this afternoon    Follow-up  Based on results of voiding trial.      B) Medical   -no issues    Gail Yang MD    "

## 2020-12-15 NOTE — PLAN OF CARE
Patient vital signs stable on 2L O2 via NC. Adequate UOP via mendoza. Pain controlled by scheduled Toradol. Incision sites MELANY, without signs of infection. Tolerating ice chips without nausea.

## 2020-12-15 NOTE — PLAN OF CARE
Pt moving independently.  Tolerating PO fluids well, denies nausea.  Pain well controlled with scheduled toradol.  Pt declines other pain medications.  O2 flowing at 1L per pt request, sats remain %.  Pt asking that as long as she has the capnography, the O2 continue.  Incisions intact with no drainage.  Taylor draining clear yellow urine in adequate amounts.  Will continue to monitor.

## 2020-12-15 NOTE — ANESTHESIA POSTPROCEDURE EVALUATION
Patient: Brittany Chan    Procedure(s):  Robotic assisted sacrocolpopexy, supracervical hysterectomy with bilateral salpingo-oophorectomy, abdominal enterocele repair, midurethral sling, cystoscopy, and  posterior repair    Diagnosis:Uterovaginal prolapse, incomplete [N81.2]  Female stress incontinence [N39.3]  Incomplete defecation [R15.0]  Rectocele [N81.6]  Diagnosis Additional Information: No value filed.    Anesthesia Type:  General    Note:  Anesthesia Post Evaluation    Patient location during evaluation: PACU  Patient participation: Able to fully participate in evaluation  Level of consciousness: awake  Pain management: adequate  Airway patency: patent  Cardiovascular status: acceptable  Respiratory status: acceptable  Hydration status: acceptable  PONV: controlled     Anesthetic complications: None          Last vitals:  Vitals:    12/14/20 1925 12/14/20 1940 12/14/20 2000   BP: 116/80 108/68 114/74   Pulse: 54 63 60   Resp: 10 10 13   Temp:      SpO2: 96% 98% 94%         Electronically Signed By: Rios Leonard DO  December 14, 2020  8:20 PM

## 2020-12-15 NOTE — PROGRESS NOTES
All discharge instructions were reviewed with patient by writer and all question answered.  Discharge medications issued and next times available discussed.  Patient left unit via wheelchair at 1522. with all belongings.

## 2020-12-15 NOTE — ANESTHESIA CARE TRANSFER NOTE
Patient: Brittany Chan    Procedure(s):  Robotic assisted sacrocolpopexy, supracervical hysterectomy with bilateral salpingo-oophorectomy, abdominal enterocele repair, midurethral sling, cystoscopy, and  posterior repair    Diagnosis: Uterovaginal prolapse, incomplete [N81.2]  Female stress incontinence [N39.3]  Incomplete defecation [R15.0]  Rectocele [N81.6]  Diagnosis Additional Information: No value filed.    Anesthesia Type:   General     Note:  Airway :Face Mask  Patient transferred to:PACU  Comments: Pt to PACU,  VSS., report to RNHandoff Report: Identifed the Patient, Identified the Reponsible Provider, Reviewed the pertinent medical history, Discussed the surgical course, Reviewed Intra-OP anesthesia mangement and issues during anesthesia, Set expectations for post-procedure period and Allowed opportunity for questions and acknowledgement of understanding      Vitals: (Last set prior to Anesthesia Care Transfer)    CRNA VITALS  12/14/2020 1821 - 12/14/2020 1859      12/14/2020             Pulse:  81    SpO2:  98 %    Resp Rate (observed):  16                Electronically Signed By: ELI Townsend CRNA  December 14, 2020  6:59 PM

## 2020-12-15 NOTE — DISCHARGE INSTRUCTIONS
Prolapse/Pelvic Reconstructive Surgery  Instructions for Caring for yourself after Surgery   FOLLOW UP WITH DR. MERCADO ON 12/29 AT 9:30.     How do I manage my pain?   Pain and tenderness should lessen each day. To help keep pain under control, use the following guidelines:    Apply ice packs to your perineum (vaginal and rectal area) for the 1st couple of days.    Take 600 milligrams (mg) of ibuprofen (Advil) every 6 hours for the 1st several days.     Use your prescribed narcotic (hydromorphone) for additional pain relief as needed.    Do not drive, drink alcohol or make any major decisions, such as signing important papers or managing legal issues, while taking prescription pain medication.     Take pain medication with food to avoid an upset stomach.    How do I care for my perineum?    Use pads for vaginal discharge after surgery. Discharge is normal and can last several weeks. Discharge may appear bloody, yellow or white.    Do not place anything in your vagina until advised by your doctor.     What about bathing?       Do not take a tub bath, use a hot tub or swim until advised by your doctor. You may take showers.    What about bowel and bladder management?    Keep stools soft and regular. We recommend using the following medicine that loosens stools and increases bowel movements:  - MiraLAX-  17 grams or a capful daily following surgery.      When urinating, do not bear down. Relax and allow the bladder muscle to contract. If you are unable to urinate, contact your doctor.    If you go home with a catheter, your doctor may prescribe an antibiotic for you to take before bed to help prevent infection. Follow up in the clinic as instructed to have the catheter removed.    What about activity?    Do not lift more than 10 pounds for 12 weeks after surgery. Avoid heavy pushing or pulling, such as vacuuming or lawn mowing. Your body s tissues need time to heal and regain maximum strength.    Keep Active. Walking  is encouraged. Gradually build up how long and far you walk. Climbing stairs is OK if able.        You may resume driving when you are no longer taking narcotic pain medication and have the strength to use the brake pedal as needed.     When do I call my doctor?  Call Dr. Yang call [phone] if you have:     (for urgent questions/concerns CELL PHONE [phone])    -Any post-operative questions or concerns     -A fever over 100.4 F (38 C)      -Difficulty emptying your bladder    -Chills         -Worsening pain                -Nausea or vomiting

## 2020-12-15 NOTE — PLAN OF CARE
Pt up walking in [place], tolerated well.  O2 and capnography discontinued per verbal order from MD.  Last dose of toradol given, then will switch to oral ibuprofen per verbal order from MD.

## 2020-12-15 NOTE — PLAN OF CARE
A&Ox4. VSS on RA. C/o abdominal pain; given ibuprofen x1.Pt aware dilaudid is available as well. Abdominal surgical incisions WDL with no drainage noted. Vaginal packing remains in place. Scant bleeding noted.  Up independently in the room. Taylor patent with good output. Plan later for removal with voiding trial. IV SL. MD to check in at noon re; status. Possible discharge later today.

## 2020-12-16 LAB — COPATH REPORT: NORMAL

## 2020-12-28 ENCOUNTER — NURSE TRIAGE (OUTPATIENT)
Dept: NURSING | Facility: CLINIC | Age: 65
End: 2020-12-28

## 2020-12-28 ENCOUNTER — VIRTUAL VISIT (OUTPATIENT)
Dept: URGENT CARE | Facility: CLINIC | Age: 65
End: 2020-12-28
Payer: COMMERCIAL

## 2020-12-28 DIAGNOSIS — Z20.822 EXPOSURE TO COVID-19 VIRUS: Primary | ICD-10-CM

## 2020-12-28 PROCEDURE — 99213 OFFICE O/P EST LOW 20 MIN: CPT | Mod: TEL | Performed by: FAMILY MEDICINE

## 2020-12-28 NOTE — PROGRESS NOTES
"The patient has been notified of following:     \"This telephone or video visit will be conducted via a call between you and your physician/provider. We have found that certain health care needs can be provided without the need for a physical exam.  This service lets us provide the care you need with a short phone conversation.  If a prescription is necessary we can send it directly to your pharmacy.  If lab work is needed we can place an order for that and you can then stop by our lab to have the test done at a later time.    Telephone or video visits are billed at different rates depending on your insurance coverage. During this emergency period, for some insurers they may be billed the same as an in-person visit.  Please reach out to your insurance provider with any questions.    Patient has given verbal consent for Telephone or video visit?  Yes  Subjective   HPI    12/11 had preop covid test negative   12/14 had surgery     is scheduled 12/30 for colonoscopy and had pre-procedure covid test yesterday     is asymptomatic positive  Patient is asymptomatic     Patient is a nurse     Patient Active Problem List   Diagnosis     Loss of height     Family history of other endocrine and metabolic diseases     Flatulence, eructation, and gas pain     Migraine     Esophageal reflux     PCOS (polycystic ovarian syndrome)     Hemorrhoid     HYPERLIPIDEMIA LDL GOAL <130     Family history of diabetes mellitus     Atypical squamous cells cannot exclude high grade squamous intraepithelial lesion on cytologic smear of cervix (ASC-H)     Stress incontinence     Overweight (BMI 25.0-29.9)     Anxiety     Post-operative state     Past Surgical History:   Procedure Laterality Date     BREAST LUMPECTOMY, RT/LT  1978    lt fibroadenoma     COLONOSCOPY       COLONOSCOPY Left 9/26/2019    Procedure: COLONOSCOPY;  Surgeon: Renita Pablo MD;  Location:  GI     DAVINCI PELVIC PROCEDURE N/A 12/14/2020    Procedure: " Robotic assisted sacrocolpopexy, supracervical hysterectomy with bilateral salpingo-oophorectomy, abdominal enterocele repair, midurethral sling, cystoscopy, and  posterior repair;  Surgeon: Gail Yang MD;  Location:  OR     ESOPHAGOSCOPY, GASTROSCOPY, DUODENOSCOPY (EGD), COMBINED N/A 2016    Procedure: COMBINED ESOPHAGOSCOPY, GASTROSCOPY, DUODENOSCOPY (EGD);  Surgeon: Feroz Ramirez MD;  Location:  GI     ORTHOPEDIC SURGERY Right 2014    rotator cuff repair     TONSILLECTOMY & ADENOIDECTOMY         Social History     Tobacco Use     Smoking status: Former Smoker     Packs/day: 0.00     Years: 0.00     Pack years: 0.00     Quit date: 1980     Years since quittin.0     Smokeless tobacco: Never Used   Substance Use Topics     Alcohol use: Yes     Comment: 5 a week     Family History   Problem Relation Age of Onset     Hypertension Father      Prostate Cancer Father      Eye Disorder Father         macular degeneration     Allergies Mother         nuts     Arthritis Mother      Osteoporosis Mother      Obesity Sister      Gynecology Sister         polycystic ovaries     Diabetes Nephew         type 2     Anxiety Disorder Daughter            Reviewed and updated as needed this visit by Provider   Allergies  Meds              Review of Systems   Constitutional, HEENT, cardiovascular, pulmonary, GI, , musculoskeletal, neuro, skin, endocrine and psych systems are negative, except as otherwise noted.       Objective   Reported vitals:  There were no vitals taken for this visit.   healthy, alert and no distress  PSYCH: Alert and oriented times 3; coherent speech, normal   rate and volume, able to articulate logical thoughts, able   to abstract reason, no tangential thoughts, no hallucinations   or delusions  Her affect is normal  RESP: No cough, no audible wheezing, able to talk in full sentences  Additional exam:  None   Remainder of exam unable to be completed due to telephone  visits    Diagnostic Test Results:  Labs reviewed in Epic  none         Assessment/Plan:      ICD-10-CM    1. Exposure to COVID-19 virus  Z20.828 Asymptomatic COVID-19 Virus (Coronavirus) by PCR       If you know you have had close contact with someone who tested positive, you should be quarantined for 14 days after this exposure. You should stay in quarantine for the14 days even if the covid test is negative, the optimal time to test after exposure is 5-7 days from the exposure  Quarantine means   What should I do?  For safety, it's very important to follow these rules. Do this for 14 days after the date you were last exposed to the virus..  Stay home and away from others. Don't go to school or anywhere else. Generally quarantine means staying home for work but there are some exceptions to this. Please contact your workplace.   No hugging, kissing or shaking hands.  Don't let anyone visit.  Cover your mouth and nose with a mask, tissue or washcloth to avoid spreading germs.  Wash your hands and face often. Use soap and water.  What are the symptoms of COVID-19?  The most common symptoms are cough, fever and trouble breathing. Less common symptoms include headache, body aches, fatigue (feeling very tired), chills, sore throat, stuffy or runny nose, diarrhea (loose poop), loss of taste or smell, belly pain, and nausea or vomiting (feeling sick to your stomach or throwing up).  After 14 days, if you have still don't have symptoms, you likely don't have this virus.  If you develop symptoms, follow these guidelines.  If you're normally healthy: Please start another [facility] visit to report your symptoms. Go to [email or web address].  If you have a serious health problem (like cancer, heart failure, an organ transplant or kidney disease): Call your specialty clinic. Let them know that you might have COVID-19.  2. When it's time for your COVID test:  Stay at least 6 feet away from others. (If someone will drive you to your test, stay  in the backseat, as far away from the  as you can.)  Cover your mouth and nose with a mask, tissue or washcloth.  Go straight to the testing site. Don't make any stops on the way there or back.  Please note  Caregivers in these groups are at risk for severe illness due to COVID-19:  o People 65 years and older  o People who live in a nursing home or long-term care facility  o People with chronic disease (lung, heart, cancer, diabetes, kidney, liver, immunologic)  o People who have a weakened immune system, including those who:  Are in cancer treatment  Take medicine that weakens the immune system, such as corticosteroids  Had a bone marrow or organ transplant  Have an immune deficiency  Have poorly controlled HIV or AIDS  Are obese (body mass index of 40 or higher)  Smoke regularly  Where can I get more information?  [facility] - About COVID-19: [email or web address]  CDC - What to Do If You're Sick: www.cdc.gov/coronavirus/2019-ncov/about/steps-when-sick.html  CDC - Ending Home Isolation: www.cdc.gov/coronavirus/2019-ncov/hcp/disposition-in-home-patients.html  CDC - Caring for Someone: www.cdc.gov/coronavirus/2019-ncov/if-you-are-sick/care-for-someone.html  [facility] - Interim Guidance for Hospital Discharge to Home: www.health.[facility].[place]/diseases/coronavirus/hcp/hospdischarge.pdf  [facility] clinical trials (COVID-19 research studies): clinicalaffairs.[facility]-clinical-trials  Below are the COVID-19 hotlines at the [place] Department of Health ([facility]). Interpreters are available.  For health questions: Call 546-068-5694 or 1-122.584.9643 (7 a.m. to 7 p.m.)   For questions about schools and childcare: Call 293-232-2047 or 1-872.751.6216 (7 a.m. to 7 p.m.)      call duration:  16 minutes  Video: yony Zurita MD

## 2020-12-28 NOTE — TELEPHONE ENCOUNTER
Pt tested negative for covid prior to her procedure.  Test date Dec 11th (seventeen days ago).     then tested positive Dec 27th (yesterday).  Pt herself therefore has definite known exposure.    No symptoms for pt.  No symptoms for .   is currently isolating.  Pt herself is now quarantining.    The concern is pt's elderly mother, currently living in same household.  Elderly mother has no symptoms.    Pt herself would like re-testing to determine whether she might now be positive (although asymptomatic), as her  now is.    Transferred to a  for an appointment now.    Lizette VÁSQUEZ Health Nurse Advisor     Reason for Disposition    [1] CLOSE CONTACT COVID-19 EXPOSURE within last 14 days AND [2] NO symptoms    Additional Information    Negative: COVID-19 lab test positive    Negative: [1] Lives with someone known to have influenza (flu test positive) AND [2] flu-like symptoms (e.g., cough, runny nose, sore throat, SOB; with or without fever)    Negative: [1] Symptoms of COVID-19 (e.g., cough, fever, SOB, or others) AND [2] HCP diagnosed COVID-19 based on symptoms    Negative: [1] Symptoms of COVID-19 (e.g., cough, fever, SOB, or others) AND [2] lives in an area with community spread    Negative: [1] Symptoms of COVID-19 (e.g., cough, fever, SOB, or others) AND [2] within 14 days of EXPOSURE (close contact) with diagnosed or suspected COVID-19 patient    Negative: [1] Symptoms of COVID-19 (e.g., cough, fever, SOB, or others) AND [2] within 14 days of travel from high-risk area for COVID-19 community spread (identified by CDC)    Negative: [1] Difficulty breathing (shortness of breath) occurs AND [2] onset > 14 days after COVID-19 EXPOSURE (Close Contact)    Negative: [1] Dry cough occurs AND [2] onset > 14 days after COVID-19 EXPOSURE    Negative: [1] Wet cough (i.e., white-yellow, yellow, green, or alejandro colored sputum) AND [2] onset > 14 days after COVID-19 EXPOSURE    Negative:  [1] Common cold symptoms AND [2] onset > 14 days after COVID-19 EXPOSURE    Negative: [1] CLOSE CONTACT COVID-19 EXPOSURE within last 14 days AND [2] needs COVID-19 lab test to return to work AND [3] NO symptoms    Negative: [1] CLOSE CONTACT COVID-19 EXPOSURE within last 14 days AND [2] exposed person is a  (e.g., police or paramedic) AND [3] NO symptoms    Negative: [1] CLOSE CONTACT COVID-19 EXPOSURE within last 14 days AND [2] exposed person is a healthcare worker who was NOT using all recommended personal protective equipment (i.e., a respirator-N95 mask, eye protection, gloves, and gown) AND [3] NO symptoms    Negative: [1] Living or working in a correctional facility, long-term care facility, or shelter (i.e., congregate setting; densely populated) AND [2] where an outbreak has occurred AND [3] NO symptoms    [facility] Specific Disposition  - [facility] Specific Patient Instructions  COVID 19 Nurse Triage Plan/Patient Instructions    Please be aware that novel coronavirus (COVID-19) may be circulating in the community. If you develop symptoms such as fever, cough, or SOB or if you have concerns about the presence of another infection including coronavirus (COVID-19), please contact your health care provider or visit [email or web address].       Virtual Visit with provider recommended. Reference Visit Selection Guide.    Thank you for taking steps to prevent the spread of this virus.  Limit your contact with others.  Wear a simple mask to cover your cough.  Wash your hands well and often.    Resources  [facility]: About COVID-19: [email or web address]  CDC: What to Do If You're Sick: www.cdc.gov/coronavirus/2019-ncov/about/steps-when-sick.html  CDC: Ending Home Isolation: www.cdc.gov/coronavirus/2019-ncov/hcp/disposition-in-home-patients.html   CDC: Caring for Someone: www.cdc.gov/coronavirus/2019-ncov/if-you-are-sick/care-for-someone.html   [facility]: Interim Guidance for  Hospital Discharge to Home: www.health.[facility].[place]/diseases/coronavirus/hcp/hospdischarge.pdf  [facility] clinical trials (COVID-19 research studies): clinicalaffairs.[facility]/n-clinical-trials   Below are the COVID-19 hotlines at the [place] Department of Health ([facility]). Interpreters are available.   For health questions: Call 638-839-1342 or 1-528.617.5397 (7 a.m. to 7 p.m.)  For questions about schools and childcare: Call 731-179-7110 or 1-787.683.6866 (7 a.m. to 7 p.m.)    Protocols used: CORONAVIRUS (COVID-19) EXPOSURE-A- 12.1

## 2021-01-02 DIAGNOSIS — Z20.822 EXPOSURE TO COVID-19 VIRUS: Primary | ICD-10-CM

## 2021-01-02 LAB
LABORATORY COMMENT REPORT: NORMAL
SARS-COV-2 RNA SPEC QL NAA+PROBE: NEGATIVE
SARS-COV-2 RNA SPEC QL NAA+PROBE: NORMAL
SPECIMEN SOURCE: NORMAL
SPECIMEN SOURCE: NORMAL

## 2021-01-02 PROCEDURE — U0005 INFEC AGEN DETEC AMPLI PROBE: HCPCS | Performed by: FAMILY MEDICINE

## 2021-01-02 PROCEDURE — U0003 INFECTIOUS AGENT DETECTION BY NUCLEIC ACID (DNA OR RNA); SEVERE ACUTE RESPIRATORY SYNDROME CORONAVIRUS 2 (SARS-COV-2) (CORONAVIRUS DISEASE [COVID-19]), AMPLIFIED PROBE TECHNIQUE, MAKING USE OF HIGH THROUGHPUT TECHNOLOGIES AS DESCRIBED BY CMS-2020-01-R: HCPCS | Performed by: FAMILY MEDICINE

## 2021-01-18 ENCOUNTER — DOCUMENTATION ONLY (OUTPATIENT)
Dept: OTHER | Facility: CLINIC | Age: 66
End: 2021-01-18

## 2021-01-22 ENCOUNTER — TELEPHONE (OUTPATIENT)
Dept: FAMILY MEDICINE | Facility: CLINIC | Age: 66
End: 2021-01-22

## 2021-01-22 NOTE — TELEPHONE ENCOUNTER
Reason for call:  Medication   If this is a refill request, has the caller requested the refill from the pharmacy already? Yes  Will the patient be using a [facility]? No  Name of the pharmacy and phone number for the current request: [facility] [phone]    Name of the medication requested: simvastatin (ZOCOR) 20 MG tablet    Other request: GOING OUT OF TOWN, NEEDS BEFORE THEN.     Phone number to reach patient:  Cell number on file:    No relevant phone numbers on file.       Best Time:  ANY    Can we leave a detailed message on this number?  Not Applicable    Travel screening: Not Applicable

## 2021-04-11 ENCOUNTER — MYC MEDICAL ADVICE (OUTPATIENT)
Dept: FAMILY MEDICINE | Facility: CLINIC | Age: 66
End: 2021-04-11

## 2021-04-12 ENCOUNTER — E-VISIT (OUTPATIENT)
Dept: FAMILY MEDICINE | Facility: CLINIC | Age: 66
End: 2021-04-12
Payer: COMMERCIAL

## 2021-04-12 DIAGNOSIS — Z53.9 ERRONEOUS ENCOUNTER--DISREGARD: Primary | ICD-10-CM

## 2021-04-12 PROCEDURE — 99207 PR NON-BILLABLE SERV PER CHARTING: CPT | Performed by: FAMILY MEDICINE

## 2021-04-26 ENCOUNTER — MYC REFILL (OUTPATIENT)
Dept: FAMILY MEDICINE | Facility: CLINIC | Age: 66
End: 2021-04-26

## 2021-04-26 DIAGNOSIS — E78.5 HYPERLIPIDEMIA WITH TARGET LDL LESS THAN 130: ICD-10-CM

## 2021-04-28 RX ORDER — SIMVASTATIN 20 MG
20 TABLET ORAL AT BEDTIME
Qty: 90 TABLET | Refills: 0 | Status: SHIPPED | OUTPATIENT
Start: 2021-04-28 | End: 2021-07-28

## 2021-04-28 NOTE — TELEPHONE ENCOUNTER
LMTCB- due for a VIDEO visit with Dr. Polanco or partner.   Sent pt [facility] too.      Return in about 6 months (around 4/7/2021) for High blood pressure med check, Video Visit.      Eileen REYES RN

## 2021-04-29 NOTE — TELEPHONE ENCOUNTER
Patient called back and preferred in person appt, this was scheduled.     Arielle Baker RN on 4/29/2021 at 8:36 AM

## 2021-05-21 DIAGNOSIS — E28.2 PCOS (POLYCYSTIC OVARIAN SYNDROME): ICD-10-CM

## 2021-05-21 RX ORDER — METFORMIN HCL 500 MG
500 TABLET, EXTENDED RELEASE 24 HR ORAL
Qty: 90 TABLET | Refills: 0 | Status: SHIPPED | OUTPATIENT
Start: 2021-05-21 | End: 2021-07-28

## 2021-07-28 ENCOUNTER — OFFICE VISIT (OUTPATIENT)
Dept: FAMILY MEDICINE | Facility: CLINIC | Age: 66
End: 2021-07-28
Payer: COMMERCIAL

## 2021-07-28 VITALS
HEIGHT: 65 IN | TEMPERATURE: 97.8 F | RESPIRATION RATE: 16 BRPM | DIASTOLIC BLOOD PRESSURE: 74 MMHG | OXYGEN SATURATION: 98 % | SYSTOLIC BLOOD PRESSURE: 100 MMHG | BODY MASS INDEX: 26.64 KG/M2 | WEIGHT: 159.9 LBS | HEART RATE: 57 BPM

## 2021-07-28 DIAGNOSIS — E28.2 PCOS (POLYCYSTIC OVARIAN SYNDROME): ICD-10-CM

## 2021-07-28 DIAGNOSIS — K21.9 GASTROESOPHAGEAL REFLUX DISEASE WITHOUT ESOPHAGITIS: ICD-10-CM

## 2021-07-28 DIAGNOSIS — L30.9 ECZEMA, UNSPECIFIED TYPE: ICD-10-CM

## 2021-07-28 DIAGNOSIS — I10 ESSENTIAL HYPERTENSION: Primary | ICD-10-CM

## 2021-07-28 DIAGNOSIS — E78.5 HYPERLIPIDEMIA WITH TARGET LDL LESS THAN 130: ICD-10-CM

## 2021-07-28 DIAGNOSIS — M19.049 OSTEOARTHRITIS OF FINGER, UNSPECIFIED LATERALITY: ICD-10-CM

## 2021-07-28 LAB
ALBUMIN SERPL-MCNC: 3.5 G/DL (ref 3.4–5)
ALP SERPL-CCNC: 70 U/L (ref 40–150)
ALT SERPL W P-5'-P-CCNC: 32 U/L (ref 0–70)
ANION GAP SERPL CALCULATED.3IONS-SCNC: 2 MMOL/L (ref 3–14)
AST SERPL W P-5'-P-CCNC: 21 U/L (ref 0–45)
BILIRUB SERPL-MCNC: 0.3 MG/DL (ref 0.2–1.3)
BUN SERPL-MCNC: 16 MG/DL (ref 7–30)
CALCIUM SERPL-MCNC: 9.5 MG/DL (ref 8.5–10.1)
CHLORIDE BLD-SCNC: 105 MMOL/L (ref 94–109)
CHOLEST SERPL-MCNC: 255 MG/DL
CO2 SERPL-SCNC: 31 MMOL/L (ref 20–32)
CREAT SERPL-MCNC: 0.72 MG/DL (ref 0.52–1.25)
ERYTHROCYTE [DISTWIDTH] IN BLOOD BY AUTOMATED COUNT: 13.7 % (ref 10–15)
FASTING STATUS PATIENT QL REPORTED: YES
GFR SERPL CREATININE-BSD FRML MDRD: 89 ML/MIN/1.73M2
GLUCOSE BLD-MCNC: 101 MG/DL (ref 70–99)
HBA1C MFR BLD: 5.5 % (ref 0–5.6)
HCT VFR BLD AUTO: 42.5 % (ref 35–53)
HDLC SERPL-MCNC: 96 MG/DL
HGB BLD-MCNC: 13.5 G/DL (ref 11.7–17.7)
LDLC SERPL CALC-MCNC: 142 MG/DL
MCH RBC QN AUTO: 27.9 PG (ref 26.5–33)
MCHC RBC AUTO-ENTMCNC: 31.8 G/DL (ref 31.5–36.5)
MCV RBC AUTO: 88 FL (ref 78–100)
NONHDLC SERPL-MCNC: 159 MG/DL
PLATELET # BLD AUTO: 258 10E3/UL (ref 150–450)
POTASSIUM BLD-SCNC: 4.4 MMOL/L (ref 3.4–5.3)
PROT SERPL-MCNC: 6.9 G/DL (ref 6.8–8.8)
RBC # BLD AUTO: 4.84 10E6/UL (ref 3.8–5.9)
SODIUM SERPL-SCNC: 138 MMOL/L (ref 133–144)
TRIGL SERPL-MCNC: 85 MG/DL
TSH SERPL DL<=0.005 MIU/L-ACNC: 1.38 MU/L (ref 0.4–4)
WBC # BLD AUTO: 3.5 10E3/UL (ref 4–11)

## 2021-07-28 PROCEDURE — 84443 ASSAY THYROID STIM HORMONE: CPT | Performed by: FAMILY MEDICINE

## 2021-07-28 PROCEDURE — 99214 OFFICE O/P EST MOD 30 MIN: CPT | Performed by: FAMILY MEDICINE

## 2021-07-28 PROCEDURE — 36415 COLL VENOUS BLD VENIPUNCTURE: CPT | Performed by: FAMILY MEDICINE

## 2021-07-28 PROCEDURE — 80061 LIPID PANEL: CPT | Performed by: FAMILY MEDICINE

## 2021-07-28 PROCEDURE — 85027 COMPLETE CBC AUTOMATED: CPT | Performed by: FAMILY MEDICINE

## 2021-07-28 PROCEDURE — 83036 HEMOGLOBIN GLYCOSYLATED A1C: CPT | Performed by: FAMILY MEDICINE

## 2021-07-28 PROCEDURE — 80053 COMPREHEN METABOLIC PANEL: CPT | Performed by: FAMILY MEDICINE

## 2021-07-28 RX ORDER — SIMVASTATIN 20 MG
20 TABLET ORAL AT BEDTIME
Qty: 90 TABLET | Refills: 0 | Status: SHIPPED | OUTPATIENT
Start: 2021-07-28 | End: 2021-08-27

## 2021-07-28 RX ORDER — METFORMIN HCL 500 MG
500 TABLET, EXTENDED RELEASE 24 HR ORAL
Qty: 90 TABLET | Refills: 0 | Status: SHIPPED | OUTPATIENT
Start: 2021-07-28 | End: 2021-08-27

## 2021-07-28 RX ORDER — HYDROCHLOROTHIAZIDE 12.5 MG/1
12.5 CAPSULE ORAL DAILY
Qty: 90 CAPSULE | Refills: 2 | Status: SHIPPED | OUTPATIENT
Start: 2021-07-28 | End: 2021-11-16

## 2021-07-28 ASSESSMENT — MIFFLIN-ST. JEOR: SCORE: 1268.24

## 2021-07-28 NOTE — PROGRESS NOTES
"    Assessment & Plan     Essential hypertension  Well controlled and ok to taper off hydrochlorothiazide or use prn. She will consider taking it as needed or every other day    Hyperlipidemia with target LDL less than 130  Cont same, no side effects and is fasting for labs  - simvastatin (ZOCOR) 20 MG tablet; Take 1 tablet (20 mg) by mouth At Bedtime at bedtime.  - Lipid panel reflex to direct LDL Fasting; Future  - Lipid panel reflex to direct LDL Fasting    Gastroesophageal reflux disease without esophagitis  Refilled, doing well  - omeprazole (PRILOSEC) 20 MG DR capsule; Take 1 capsule (20 mg) by mouth daily    PCOS (polycystic ovarian syndrome)  Recheck, much more active, but no diet changes, discussed  - metFORMIN (GLUCOPHAGE-XR) 500 MG 24 hr tablet; Take 1 tablet (500 mg) by mouth daily (with dinner)  - Comprehensive metabolic panel (BMP + Alb, Alk Phos, ALT, AST, Total. Bili, TP); Future  - TSH with free T4 reflex; Future  - Hemoglobin A1c; Future  - CBC with platelets; Future  - Comprehensive metabolic panel (BMP + Alb, Alk Phos, ALT, AST, Total. Bili, TP)  - TSH with free T4 reflex  - Hemoglobin A1c  - CBC with platelets    Osteoarthritis of finger, unspecified laterality  Heat, voltaren gel, avoid prolonged activity with the joint immobile, diane holding on to small items    Eczema, unspecified type  Nonspecific, recommend topical steroid cream, follow up if not improving or getting worse      Ordering of each unique test  Prescription drug management  40 minutes spent on the date of the encounter doing chart review, history and exam, documentation and further activities per the note       BMI:   Estimated body mass index is 27.02 kg/m  as calculated from the following:    Height as of this encounter: 1.638 m (5' 4.5\").    Weight as of this encounter: 72.5 kg (159 lb 14.4 oz).   Weight management plan: Discussed healthy diet and exercise guidelines    Work on weight loss  Regular exercise-al;ready doing a " great job!!  Discussed diet    Return in about 6 months (around 1/28/2022) for Preventive Visit.    Sadaf Polanco MD  [facility] BUCK Soto is a 66 year old who presents for the following health issues   RIDING HER BIKE EVERY DAY    HPI     Hyperlipidemia Follow-Up      Are you regularly taking any medication or supplement to lower your cholesterol?   Yes- zocor     Are you having muscle aches or other side effects that you think could be caused by your cholesterol lowering medication?  No    Hypertension Follow-up      Do you check your blood pressure regularly outside of the clinic? Yes     Are you following a low salt diet? No    Are your blood pressures ever more than 140 on the top number (systolic) OR more   than 90 on the bottom number (diastolic), for example 140/90? No      How many servings of fruits and vegetables do you eat daily?  2-3    On average, how many sweetened beverages do you drink each day (Examples: soda, juice, sweet tea, etc.  Do NOT count diet or artificially sweetened beverages)?   0    How many days per week do you exercise enough to make your heart beat faster? 7    How many minutes a day do you exercise enough to make your heart beat faster? 30 - 60  How many days per week do you miss taking your medication? Once every two weeks     What makes it hard for you to take your medications?  remembering to take     Her BP has been great, rarely feels lightheaded. Does notice going to the bathroom more often at times    Medication Followup of METFORMIN     Taking Medication as prescribed: yes    Side Effects:  None    Medication Helping Symptoms:  Yes    No weight loss, but much more active, hoping labs will look better today, frustrated with her weight     Finger pain, along PIP joint, right hand-on and off, but can be terrible at times, worse with crafting, maybe biking? voltaren gel does help her finger join pain, she thinks it maybe arthritis,  "but wanted to discuss    PELVIC FLOOR-s/p surgery, see chart, doing well, rectocele and cystocele all fixed.    Elbow rash, noticed a while ago, feels bumpy and has some scale, no hx of psoriasis, but she is wondering if this could be the problem? Using lotion so far, not helping much, not painful.        Review of Systems   CONSTITUTIONAL: NEGATIVE for fever, chills, change in weight  ENT/MOUTH: NEGATIVE for ear, mouth and throat problems  RESP: NEGATIVE for significant cough or SOB  CV: NEGATIVE for chest pain, palpitations or peripheral edema      Objective    /74 (BP Location: Right arm, Patient Position: Sitting, Cuff Size: Adult Regular)   Pulse 57   Temp 97.8  F (36.6  C) (Oral)   Resp 16   Ht 1.638 m (5' 4.5\")   Wt 72.5 kg (159 lb 14.4 oz)   LMP 10/05/2012   SpO2 98%   BMI 27.02 kg/m    Body mass index is 27.02 kg/m .  Physical Exam   GENERAL: healthy, alert and no distress  NECK: no adenopathy, no asymmetry, masses, or scars and thyroid normal to palpation  RESP: lungs clear to auscultation - no rales, rhonchi or wheezes  CV: regular rate and rhythm, normal S1 S2, no S3 or S4, no murmur, click or rub, no peripheral edema and peripheral pulses strong  Finger joints appear normal, no deformity or nodules, no redness or warmth  Elbow rash, raised chalky in color, no redness or plaque.   MS: no gross musculoskeletal defects noted, no edema                "

## 2021-07-28 NOTE — PATIENT INSTRUCTIONS
The Mediterranean Diet can reduce your risk of Heart Disease and Stroke    Recommended:     Olive oil         >4 Tbs/day  Tree nuts       >3 handfuls/wk, prefer once daily 1/4 cup, Almonds, Walnuts, Hazelnuts preferred   Fresh Fruits   >3/day  Vegetables     >2/day  Fish, seafood >3/week  Legumes        >3/week  White meat     Instead of red meat  Wine with meals, optional, only for those who wish to drink, up to 7 drinks per week    Discouraged; Limit the following    Soda drinks                 <1/day  Commercial baked goods, sweets, pastries  <3/week  Spread fats                 <1/day  Red meat                    <1/day  Or processed meats  <1/day     Taken from [place] Journal of Medicine Feb 2013

## 2021-10-23 ENCOUNTER — HEALTH MAINTENANCE LETTER (OUTPATIENT)
Age: 66
End: 2021-10-23

## 2021-11-03 ENCOUNTER — MYC MEDICAL ADVICE (OUTPATIENT)
Dept: FAMILY MEDICINE | Facility: CLINIC | Age: 66
End: 2021-11-03

## 2021-11-03 DIAGNOSIS — E78.5 HYPERLIPIDEMIA WITH TARGET LDL LESS THAN 130: Primary | ICD-10-CM

## 2021-11-05 ENCOUNTER — HOSPITAL ENCOUNTER (OUTPATIENT)
Dept: MAMMOGRAPHY | Facility: CLINIC | Age: 66
Discharge: HOME | End: 2021-11-05
Attending: FAMILY MEDICINE | Admitting: FAMILY MEDICINE
Payer: COMMERCIAL

## 2021-11-05 DIAGNOSIS — Z12.31 VISIT FOR SCREENING MAMMOGRAM: ICD-10-CM

## 2021-11-05 PROCEDURE — 77063 BREAST TOMOSYNTHESIS BI: CPT

## 2021-11-08 ENCOUNTER — LAB (OUTPATIENT)
Dept: LAB | Facility: CLINIC | Age: 66
End: 2021-11-08
Payer: COMMERCIAL

## 2021-11-08 ENCOUNTER — DOCUMENTATION ONLY (OUTPATIENT)
Dept: LAB | Facility: CLINIC | Age: 66
End: 2021-11-08

## 2021-11-08 DIAGNOSIS — E78.5 HYPERLIPIDEMIA WITH TARGET LDL LESS THAN 130: ICD-10-CM

## 2021-11-08 DIAGNOSIS — E28.2 PCOS (POLYCYSTIC OVARIAN SYNDROME): ICD-10-CM

## 2021-11-08 DIAGNOSIS — E28.2 PCOS (POLYCYSTIC OVARIAN SYNDROME): Primary | ICD-10-CM

## 2021-11-08 LAB
ALBUMIN SERPL-MCNC: 3.7 G/DL (ref 3.4–5)
ALP SERPL-CCNC: 76 U/L (ref 40–150)
ALT SERPL W P-5'-P-CCNC: 35 U/L (ref 0–70)
ANION GAP SERPL CALCULATED.3IONS-SCNC: 8 MMOL/L (ref 3–14)
AST SERPL W P-5'-P-CCNC: 21 U/L (ref 0–45)
BILIRUB SERPL-MCNC: 0.5 MG/DL (ref 0.2–1.3)
BUN SERPL-MCNC: 19 MG/DL (ref 7–30)
CALCIUM SERPL-MCNC: 9.1 MG/DL (ref 8.5–10.1)
CHLORIDE BLD-SCNC: 105 MMOL/L (ref 94–109)
CHOLEST SERPL-MCNC: 216 MG/DL
CO2 SERPL-SCNC: 24 MMOL/L (ref 20–32)
CREAT SERPL-MCNC: 0.7 MG/DL (ref 0.52–1.25)
FASTING STATUS PATIENT QL REPORTED: YES
GFR SERPL CREATININE-BSD FRML MDRD: >90 ML/MIN/1.73M2
GLUCOSE BLD-MCNC: 90 MG/DL (ref 70–99)
HBA1C MFR BLD: 5.4 % (ref 0–5.6)
HDLC SERPL-MCNC: 114 MG/DL
LDLC SERPL CALC-MCNC: 93 MG/DL
NONHDLC SERPL-MCNC: 102 MG/DL
POTASSIUM BLD-SCNC: 3.9 MMOL/L (ref 3.4–5.3)
PROT SERPL-MCNC: 7.3 G/DL (ref 6.8–8.8)
SODIUM SERPL-SCNC: 137 MMOL/L (ref 133–144)
TRIGL SERPL-MCNC: 44 MG/DL

## 2021-11-08 PROCEDURE — 83036 HEMOGLOBIN GLYCOSYLATED A1C: CPT

## 2021-11-08 PROCEDURE — 80061 LIPID PANEL: CPT

## 2021-11-08 PROCEDURE — 36415 COLL VENOUS BLD VENIPUNCTURE: CPT

## 2021-11-08 PROCEDURE — 80053 COMPREHEN METABOLIC PANEL: CPT

## 2021-11-08 NOTE — PROGRESS NOTES
Brtitany was in for a lab only to recheck her labs. She is requesting that an a1c be added and a Glucose as well. Please place orders as needed. Thanks!  -Arcelia

## 2021-11-10 SDOH — ECONOMIC STABILITY: FOOD INSECURITY: WITHIN THE PAST 12 MONTHS, THE FOOD YOU BOUGHT JUST DIDN'T LAST AND YOU DIDN'T HAVE MONEY TO GET MORE.: NEVER TRUE

## 2021-11-10 SDOH — ECONOMIC STABILITY: INCOME INSECURITY: HOW HARD IS IT FOR YOU TO PAY FOR THE VERY BASICS LIKE FOOD, HOUSING, MEDICAL CARE, AND HEATING?: NOT HARD AT ALL

## 2021-11-10 SDOH — ECONOMIC STABILITY: TRANSPORTATION INSECURITY
IN THE PAST 12 MONTHS, HAS THE LACK OF TRANSPORTATION KEPT YOU FROM MEDICAL APPOINTMENTS OR FROM GETTING MEDICATIONS?: NO

## 2021-11-10 SDOH — HEALTH STABILITY: PHYSICAL HEALTH: ON AVERAGE, HOW MANY DAYS PER WEEK DO YOU ENGAGE IN MODERATE TO STRENUOUS EXERCISE (LIKE A BRISK WALK)?: 3 DAYS

## 2021-11-10 SDOH — ECONOMIC STABILITY: FOOD INSECURITY: WITHIN THE PAST 12 MONTHS, YOU WORRIED THAT YOUR FOOD WOULD RUN OUT BEFORE YOU GOT MONEY TO BUY MORE.: NEVER TRUE

## 2021-11-10 SDOH — ECONOMIC STABILITY: INCOME INSECURITY: IN THE LAST 12 MONTHS, WAS THERE A TIME WHEN YOU WERE NOT ABLE TO PAY THE MORTGAGE OR RENT ON TIME?: NO

## 2021-11-10 SDOH — ECONOMIC STABILITY: TRANSPORTATION INSECURITY
IN THE PAST 12 MONTHS, HAS LACK OF TRANSPORTATION KEPT YOU FROM MEETINGS, WORK, OR FROM GETTING THINGS NEEDED FOR DAILY LIVING?: NO

## 2021-11-10 SDOH — HEALTH STABILITY: PHYSICAL HEALTH: ON AVERAGE, HOW MANY MINUTES DO YOU ENGAGE IN EXERCISE AT THIS LEVEL?: 50 MIN

## 2021-11-10 ASSESSMENT — SOCIAL DETERMINANTS OF HEALTH (SDOH)
DO YOU BELONG TO ANY CLUBS OR ORGANIZATIONS SUCH AS CHURCH GROUPS UNIONS, FRATERNAL OR ATHLETIC GROUPS, OR SCHOOL GROUPS?: NO
HOW OFTEN DO YOU ATTEND CHURCH OR RELIGIOUS SERVICES?: MORE THAN 4 TIMES PER YEAR
IN A TYPICAL WEEK, HOW MANY TIMES DO YOU TALK ON THE PHONE WITH FAMILY, FRIENDS, OR NEIGHBORS?: MORE THAN THREE TIMES A WEEK
HOW OFTEN DO YOU GET TOGETHER WITH FRIENDS OR RELATIVES?: ONCE A WEEK

## 2021-11-10 ASSESSMENT — LIFESTYLE VARIABLES
HOW OFTEN DO YOU HAVE A DRINK CONTAINING ALCOHOL: 4 OR MORE TIMES A WEEK
HOW MANY STANDARD DRINKS CONTAINING ALCOHOL DO YOU HAVE ON A TYPICAL DAY: 1 OR 2
HOW OFTEN DO YOU HAVE SIX OR MORE DRINKS ON ONE OCCASION: NEVER

## 2021-11-16 ENCOUNTER — OFFICE VISIT (OUTPATIENT)
Dept: FAMILY MEDICINE | Facility: CLINIC | Age: 66
End: 2021-11-16
Payer: COMMERCIAL

## 2021-11-16 ENCOUNTER — ANCILLARY PROCEDURE (OUTPATIENT)
Dept: GENERAL RADIOLOGY | Facility: CLINIC | Age: 66
End: 2021-11-16
Attending: FAMILY MEDICINE
Payer: COMMERCIAL

## 2021-11-16 VITALS
OXYGEN SATURATION: 99 % | HEART RATE: 70 BPM | RESPIRATION RATE: 15 BRPM | BODY MASS INDEX: 26.17 KG/M2 | DIASTOLIC BLOOD PRESSURE: 70 MMHG | TEMPERATURE: 98.3 F | WEIGHT: 157.1 LBS | HEIGHT: 65 IN | SYSTOLIC BLOOD PRESSURE: 124 MMHG

## 2021-11-16 DIAGNOSIS — L40.9 PSORIASIS: ICD-10-CM

## 2021-11-16 DIAGNOSIS — I10 ESSENTIAL HYPERTENSION: ICD-10-CM

## 2021-11-16 DIAGNOSIS — K14.6 BURNING TONGUE: ICD-10-CM

## 2021-11-16 DIAGNOSIS — K21.9 GASTROESOPHAGEAL REFLUX DISEASE WITHOUT ESOPHAGITIS: ICD-10-CM

## 2021-11-16 DIAGNOSIS — N81.89 PELVIC FLOOR WEAKNESS: ICD-10-CM

## 2021-11-16 DIAGNOSIS — R10.30 GROIN PAIN, UNSPECIFIED LATERALITY: ICD-10-CM

## 2021-11-16 DIAGNOSIS — E28.2 PCOS (POLYCYSTIC OVARIAN SYNDROME): ICD-10-CM

## 2021-11-16 DIAGNOSIS — M19.049 OSTEOARTHRITIS OF FINGER, UNSPECIFIED LATERALITY: ICD-10-CM

## 2021-11-16 DIAGNOSIS — E78.5 HYPERLIPIDEMIA WITH TARGET LDL LESS THAN 130: ICD-10-CM

## 2021-11-16 DIAGNOSIS — Z00.00 ROUTINE GENERAL MEDICAL EXAMINATION AT A HEALTH CARE FACILITY: Primary | ICD-10-CM

## 2021-11-16 PROCEDURE — 99214 OFFICE O/P EST MOD 30 MIN: CPT | Mod: 25 | Performed by: FAMILY MEDICINE

## 2021-11-16 PROCEDURE — 73522 X-RAY EXAM HIPS BI 3-4 VIEWS: CPT | Mod: FY | Performed by: RADIOLOGY

## 2021-11-16 PROCEDURE — 99396 PREV VISIT EST AGE 40-64: CPT | Performed by: FAMILY MEDICINE

## 2021-11-16 RX ORDER — TRIAMCINOLONE ACETONIDE 1 MG/G
OINTMENT TOPICAL 2 TIMES DAILY
Qty: 30 G | Refills: 3 | Status: SHIPPED | OUTPATIENT
Start: 2021-11-16 | End: 2024-05-10

## 2021-11-16 RX ORDER — METFORMIN HCL 500 MG
500 TABLET, EXTENDED RELEASE 24 HR ORAL
Qty: 90 TABLET | Refills: 1 | Status: SHIPPED | OUTPATIENT
Start: 2021-11-16 | End: 2022-08-26

## 2021-11-16 RX ORDER — CHLORAL HYDRATE 500 MG
1 CAPSULE ORAL 2 TIMES DAILY
COMMUNITY

## 2021-11-16 RX ORDER — ESTRADIOL 10 UG/1
10 INSERT VAGINAL
Qty: 24 TABLET | Refills: 3 | Status: SHIPPED | OUTPATIENT
Start: 2021-11-18 | End: 2022-02-16

## 2021-11-16 RX ORDER — HYDROCHLOROTHIAZIDE 12.5 MG/1
12.5 CAPSULE ORAL DAILY
Qty: 90 CAPSULE | Refills: 2 | Status: SHIPPED | OUTPATIENT
Start: 2021-11-16 | End: 2022-06-28

## 2021-11-16 RX ORDER — SIMVASTATIN 20 MG
20 TABLET ORAL AT BEDTIME
Qty: 90 TABLET | Refills: 3 | Status: SHIPPED | OUTPATIENT
Start: 2021-11-16 | End: 2022-11-22

## 2021-11-16 SDOH — HEALTH STABILITY: PHYSICAL HEALTH: ON AVERAGE, HOW MANY MINUTES DO YOU ENGAGE IN EXERCISE AT THIS LEVEL?: 50 MIN

## 2021-11-16 SDOH — HEALTH STABILITY: PHYSICAL HEALTH: ON AVERAGE, HOW MANY DAYS PER WEEK DO YOU ENGAGE IN MODERATE TO STRENUOUS EXERCISE (LIKE A BRISK WALK)?: 3 DAYS

## 2021-11-16 SDOH — ECONOMIC STABILITY: FOOD INSECURITY: WITHIN THE PAST 12 MONTHS, THE FOOD YOU BOUGHT JUST DIDN'T LAST AND YOU DIDN'T HAVE MONEY TO GET MORE.: NEVER TRUE

## 2021-11-16 SDOH — ECONOMIC STABILITY: FOOD INSECURITY: WITHIN THE PAST 12 MONTHS, YOU WORRIED THAT YOUR FOOD WOULD RUN OUT BEFORE YOU GOT MONEY TO BUY MORE.: NEVER TRUE

## 2021-11-16 SDOH — ECONOMIC STABILITY: INCOME INSECURITY: HOW HARD IS IT FOR YOU TO PAY FOR THE VERY BASICS LIKE FOOD, HOUSING, MEDICAL CARE, AND HEATING?: NOT HARD AT ALL

## 2021-11-16 SDOH — ECONOMIC STABILITY: INCOME INSECURITY: IN THE LAST 12 MONTHS, WAS THERE A TIME WHEN YOU WERE NOT ABLE TO PAY THE MORTGAGE OR RENT ON TIME?: NO

## 2021-11-16 ASSESSMENT — ENCOUNTER SYMPTOMS
NERVOUS/ANXIOUS: 0
NAUSEA: 0
MYALGIAS: 0
HEADACHES: 0
EYE PAIN: 0
PALPITATIONS: 0
ARTHRALGIAS: 1
DIZZINESS: 0
WEAKNESS: 0
HEARTBURN: 0
DIARRHEA: 0
DYSURIA: 0
FEVER: 0
BREAST MASS: 0
FREQUENCY: 0
SHORTNESS OF BREATH: 0
PARESTHESIAS: 0
HEMATOCHEZIA: 0
SORE THROAT: 0
CONSTIPATION: 0
COUGH: 0
JOINT SWELLING: 0
HEMATURIA: 0
ABDOMINAL PAIN: 0
CHILLS: 0

## 2021-11-16 ASSESSMENT — LIFESTYLE VARIABLES
HOW OFTEN DO YOU HAVE A DRINK CONTAINING ALCOHOL: 4 OR MORE TIMES A WEEK
HOW OFTEN DO YOU HAVE SIX OR MORE DRINKS ON ONE OCCASION: NEVER
HOW MANY STANDARD DRINKS CONTAINING ALCOHOL DO YOU HAVE ON A TYPICAL DAY: 1 OR 2

## 2021-11-16 ASSESSMENT — SOCIAL DETERMINANTS OF HEALTH (SDOH)
IN A TYPICAL WEEK, HOW MANY TIMES DO YOU TALK ON THE PHONE WITH FAMILY, FRIENDS, OR NEIGHBORS?: MORE THAN THREE TIMES A WEEK
HOW OFTEN DO YOU ATTEND CHURCH OR RELIGIOUS SERVICES?: MORE THAN 4 TIMES PER YEAR
HOW OFTEN DO YOU GET TOGETHER WITH FRIENDS OR RELATIVES?: ONCE A WEEK
DO YOU BELONG TO ANY CLUBS OR ORGANIZATIONS SUCH AS CHURCH GROUPS UNIONS, FRATERNAL OR ATHLETIC GROUPS, OR SCHOOL GROUPS?: NO

## 2021-11-16 ASSESSMENT — MIFFLIN-ST. JEOR: SCORE: 1257.13

## 2021-11-16 NOTE — PROGRESS NOTES
pre   SUBJECTIVE:   CC: Brittany Chan is an 66 year old woman who presents for preventive health visit.       Patient has been advised of split billing requirements and indicates understanding: Yes  Healthy Habits:     Getting at least 3 servings of Calcium per day:  Yes    Bi-annual eye exam:  Yes    Dental care twice a year:  Yes    Sleep apnea or symptoms of sleep apnea:  None    Diet:  Low fat/cholesterol and Carbohydrate counting    Frequency of exercise:  2-3 days/week    Duration of exercise:  45-60 minutes    Taking medications regularly:  Yes    Medication side effects:  None    PHQ-2 Total Score: 0    Additional concerns today:  Yes      Retired, exercise, choirs around the house, bike riding, walking, going to [place] to help.    Med check in July, was very active, but needed to change diet, so tried very hard to do Mediterranean diet, fish oil BID, almonds are snacking, less sugar, less fat, impossible meat.  Eating more chicken, some fish, once per week.      Hyperlipidemia Follow-Up      Are you regularly taking any medication or supplement to lower your cholesterol?   Yes- simvastatin    Are you having muscle aches or other side effects that you think could be caused by your cholesterol lowering medication?  No   LDL Cholesterol Calculated   Date Value Ref Range Status   11/08/2021 93 <=100 mg/dL Final   10/07/2020 86 <100 mg/dL Final     Comment:     Desirable:       <100 mg/dl             Hypertension Follow-up      Do you check your blood pressure regularly outside of the clinic? Yes     Are you following a low salt diet? No    Are your blood pressures ever more than 140 on the top number (systolic) OR more   than 90 on the bottom number (diastolic), for example 140/90? No      Today's PHQ-2 Score:   PHQ-2 ( 1999 Pfizer) 11/16/2021   Q1: Little interest or pleasure in doing things 0   Q2: Feeling down, depressed or hopeless 0   PHQ-2 Score 0   Q1: Little interest or pleasure in doing things Not at all    Q2: Feeling down, depressed or hopeless Not at all   PHQ-2 Score 0       Abuse: Current or Past (Physical, Sexual or Emotional) - No  Do you feel safe in your environment? Yes    Glucose monitor, bought OTC, checks daily. Sometimes, it can be 10-20 difference on same sample, , fasting in the morning.    Psoriasis on elbows, using over the counter hydrocortisone cream    Social History     Tobacco Use     Smoking status: Former Smoker     Packs/day: 0.00     Years: 0.00     Pack years: 0.00     Quit date: 1980     Years since quittin.9     Smokeless tobacco: Never Used   Substance Use Topics     Alcohol use: Yes     Comment: 5 a week       Burning tongue syndrome, incease 2019, just there, not causing her additional problems    Finger pain, DIP, not knuckles, does handwork and this causes her some pain, even comp[uter games, and some nodule started. Has tried CBD topical, voltaren gel works well.    Groin pain, for years, severe, on and off, like with movemeent, when cutting the grass, but not always with activity, more on right side, sometimes both sides    Stress incontinance, hx of bladder sling/lift, sneezing, laughing, and sometimes she has to go right away, and the bladder is not even that full. Her kegals are no longer working, hard to find those muscles.    Alcohol Use 2021   Prescreen: >3 drinks/day or >7 drinks/week? No   Prescreen: >3 drinks/day or >7 drinks/week? -       Reviewed orders with patient.  Reviewed health maintenance and updated orders accordingly - Yes  BP Readings from Last 3 Encounters:   21 124/70   21 100/74   12/15/20 117/61    Wt Readings from Last 3 Encounters:   21 71.3 kg (157 lb 1.6 oz)   21 72.5 kg (159 lb 14.4 oz)   20 74 kg (163 lb 1.6 oz)                  Recent Labs   Lab Test 21  1302 21  1301 21  1108 21  0739 20  1530 10/07/20  0905   A1C 5.4  --   --  5.5  --  5.3   LDL  --   --  93 142*  --   86   HDL  --   --  114 96  --  100   TRIG  --   --  44 85  --  45   ALT  --  35  --  32  --  41   CR  --  0.70  --  0.72 0.69 0.70   GFRESTIMATED  --  >90  --  89 >90 >90   GFRESTBLACK  --   --   --   --  >90 >90   POTASSIUM  --  3.9  --  4.4 4.1 4.2   TSH  --   --   --  1.38  --  0.86        Breast Cancer Screening:    FHS-7:   Breast CA Risk Assessment (FHS-7) 11/5/2021 11/10/2021 11/16/2021   Did any of your first-degree relatives have breast or ovarian cancer? No No No   Did any of your relatives have bilateral breast cancer? No No No   Did any man in your family have breast cancer? No No No   Did any woman in your family have breast and ovarian cancer? No No No   Did any woman in your family have breast cancer before age 50 y? No No No   Do you have 2 or more relatives with breast and/or ovarian cancer? Yes Yes Yes   Do you have 2 or more relatives with breast and/or bowel cancer? No No No       Mammogram Screening: Recommended mammography every 1-2 years with patient discussion and risk factor consideration  Pertinent mammograms are reviewed under the imaging tab.    History of abnormal Pap smear: NO - age 30- 65 PAP every 3 years recommended  PAP / HPV Latest Ref Rng & Units 8/15/2019 3/18/2016 1/19/2015   PAP (Historical) - NIL NIL ASC-US(A)   HPV16 NEG:Negative Negative Negative -   HPV18 NEG:Negative Negative Negative -   HRHPV NEG:Negative Negative Negative -     Reviewed and updated as needed this visit by clinical staff  Tobacco  Allergies  Meds   Med Hx  Surg Hx  Fam Hx  Soc Hx       Reviewed and updated as needed this visit by Provider               Past Medical History:   Diagnosis Date     Abnormal Pap smear, can't excl hi gd sq intraepithelial lesion (ASC-H) 09/29/11     Arthritis     mother     Cancer (H)     prostate- father     Complication of anesthesia     Gets tachycardic from epi in Dental numbing injections     Depressive disorder     sisters     GERD (gastroesophageal reflux disease)       Heart disease     father     High cholesterol      Hypertension     father     Incontinence of urine in female      Pelvic floor relaxation       Past Surgical History:   Procedure Laterality Date     BREAST LUMPECTOMY, RT/LT  1978    lt fibroadenoma     COLONOSCOPY       COLONOSCOPY Left 9/26/2019    Procedure: COLONOSCOPY;  Surgeon: Renita Pablo MD;  Location:  GI     DAVINCI PELVIC PROCEDURE N/A 12/14/2020    Procedure: Robotic assisted sacrocolpopexy, supracervical hysterectomy with bilateral salpingo-oophorectomy, abdominal enterocele repair, midurethral sling, cystoscopy, and  posterior repair;  Surgeon: Gail Yang MD;  Location:  OR     ESOPHAGOSCOPY, GASTROSCOPY, DUODENOSCOPY (EGD), COMBINED N/A 8/5/2016    Procedure: COMBINED ESOPHAGOSCOPY, GASTROSCOPY, DUODENOSCOPY (EGD);  Surgeon: Feroz Ramirez MD;  Location:  GI     ORTHOPEDIC SURGERY Right 2014    rotator cuff repair     TONSILLECTOMY & ADENOIDECTOMY         Review of Systems   Constitutional: Negative for chills and fever.   HENT: Negative for congestion, ear pain, hearing loss and sore throat.    Eyes: Negative for pain and visual disturbance.   Respiratory: Negative for cough and shortness of breath.    Cardiovascular: Negative for chest pain and palpitations.   Gastrointestinal: Negative for abdominal pain, constipation, diarrhea and nausea.   Genitourinary: Negative for dysuria, frequency, genital sores, hematuria and urgency.   Musculoskeletal: Positive for arthralgias. Negative for joint swelling and myalgias.   Skin: Positive for rash.   Neurological: Negative for dizziness, weakness and headaches.   Psychiatric/Behavioral: The patient is not nervous/anxious.    Answers for HPI/ROS submitted by the patient on 11/16/2021  Blood in stool: No  heartburn: No  peripheral edema: No  mood changes: No  Skin sensation changes: No  pelvic pain: Yes  vaginal bleeding: No  vaginal discharge: No  tenderness: No  breast mass:  "No  breast discharge: No  impotence: No  penile discharge: No         OBJECTIVE:   /70   Pulse 70   Temp 98.3  F (36.8  C) (Oral)   Resp 15   Ht 1.641 m (5' 4.6\")   Wt 71.3 kg (157 lb 1.6 oz)   LMP 10/05/2012   SpO2 99%   BMI 26.47 kg/m    Physical Exam  GENERAL: healthy, alert and no distress  EYES: Eyes grossly normal to inspection, PERRL and conjunctivae and sclerae normal  HENT: ear canals and TM's normal, nose and mouth without ulcers or lesions  NECK: no adenopathy, no asymmetry, masses, or scars and thyroid normal to palpation  RESP: lungs clear to auscultation - no rales, rhonchi or wheezes  BREAST: normal without masses, tenderness or nipple discharge and no palpable axillary masses or adenopathy  CV: regular rate and rhythm, normal S1 S2, no S3 or S4, no murmur, click or rub, no peripheral edema and peripheral pulses strong  ABDOMEN: soft, nontender, no hepatosplenomegaly, no masses and bowel sounds normal  MS: no gross musculoskeletal defects noted, no edema  SKIN: no suspicious lesions or rashes  NEURO: Normal strength and tone, mentation intact and speech normal  PSYCH: mentation appears normal, affect normal/bright    Diagnostic Test Results:  Labs reviewed in Epic    ASSESSMENT/PLAN:   (Z00.00) Routine general medical examination at a health care facility  (primary encounter diagnosis)  Comment: normal exam, reviewed labs  Plan: REVIEW OF HEALTH MAINTENANCE PROTOCOL ORDERS            (E78.5) Hyperlipidemia with target LDL less than 130  Comment: recheck in 1 yr, doing very well with diet changes, cont same  Plan: simvastatin (ZOCOR) 20 MG tablet            (E28.2) PCOS (polycystic ovarian syndrome)  Comment: refilled doing well, working on weight loss  Plan: metFORMIN (GLUCOPHAGE-XR) 500 MG 24 hr tablet            (K21.9) Gastroesophageal reflux disease without esophagitis  Comment: refilled,   Plan: omeprazole (PRILOSEC) 20 MG DR capsule            (L40.9) Psoriasis  Comment: on " "elbows  Plan: triamcinolone (KENALOG) 0.1 % external ointment            (I10) Essential hypertension  Comment: controlled  Plan: hydrochlorothiazide (MICROZIDE) 12.5 MG capsule            (R10.30) Groin pain, unspecified laterality  Comment: work up needed, normal exam  Plan: XR Hip Bilateral 2 Views Each, US Pelvic         Complete with Transvaginal            (N81.89) Pelvic floor weakness  Comment: suspect HRT local will help  Potential medication side effects were discussed with the patient; let me know if any occur.    Plan: estradiol (VAGIFEM) 10 MCG TABS vaginal tablet            (M19.049) Osteoarthritis of finger, unspecified laterality  Comment: heat, voltaren gel  Plan:     (K14.6) Burning tongue  Comment: on going  Plan: pt doing ok    Patient has been advised of split billing requirements and indicates understanding: Yes  COUNSELING:  Reviewed preventive health counseling, as reflected in patient instructions       Regular exercise       Healthy diet/nutrition    Estimated body mass index is 26.47 kg/m  as calculated from the following:    Height as of this encounter: 1.641 m (5' 4.6\").    Weight as of this encounter: 71.3 kg (157 lb 1.6 oz).    Weight management plan: Discussed healthy diet and exercise guidelines    Brittany TEMPLETON Dustin reports that Brittany Chna quit smoking about 41 years ago. Brittany Chan smoked 0.00 packs per day for 0.00 years. Brittany Chan has never used smokeless tobacco.      Counseling Resources:  ATP IV Guidelines  Pooled Cohorts Equation Calculator  Breast Cancer Risk Calculator  BRCA-Related Cancer Risk Assessment: FHS-7 Tool  FRAX Risk Assessment  ICSI Preventive Guidelines  Dietary Guidelines for Americans, 2010  USDA's MyPlate  ASA Prophylaxis  Lung CA Screening    Sadaf Polanco MD  [facility]  "

## 2021-11-22 ENCOUNTER — ANCILLARY PROCEDURE (OUTPATIENT)
Dept: ULTRASOUND IMAGING | Facility: CLINIC | Age: 66
End: 2021-11-22
Attending: FAMILY MEDICINE
Payer: COMMERCIAL

## 2021-11-22 DIAGNOSIS — R10.30 GROIN PAIN, UNSPECIFIED LATERALITY: ICD-10-CM

## 2021-11-22 PROCEDURE — 76830 TRANSVAGINAL US NON-OB: CPT | Performed by: OBSTETRICS & GYNECOLOGY

## 2021-11-22 PROCEDURE — 76856 US EXAM PELVIC COMPLETE: CPT | Performed by: OBSTETRICS & GYNECOLOGY

## 2021-12-21 ENCOUNTER — E-VISIT (OUTPATIENT)
Dept: FAMILY MEDICINE | Facility: CLINIC | Age: 66
End: 2021-12-21
Payer: COMMERCIAL

## 2021-12-21 DIAGNOSIS — L01.00 IMPETIGO: Primary | ICD-10-CM

## 2021-12-21 PROCEDURE — 99421 OL DIG E/M SVC 5-10 MIN: CPT | Performed by: FAMILY MEDICINE

## 2021-12-21 RX ORDER — MUPIROCIN 20 MG/G
OINTMENT TOPICAL 3 TIMES DAILY
Qty: 15 G | Refills: 1 | Status: SHIPPED | OUTPATIENT
Start: 2021-12-21 | End: 2024-05-10

## 2021-12-21 NOTE — PATIENT INSTRUCTIONS
Thank you for choosing us for your care. I have placed an order for a prescription so that you can start treatment. View your full visit summary for details by clicking on the link below. Your pharmacist will able to address any questions you may have about the medication.     If you're not feeling better within 5-7 days, please schedule an appointment.  You can schedule an appointment right here in [facility], or call [phone]  If the visit is for the same symptoms as your eVisit, we'll refund the cost of your eVisit if seen within seven days.

## 2022-01-20 ENCOUNTER — APPOINTMENT (OUTPATIENT)
Dept: GENERAL RADIOLOGY | Facility: CLINIC | Age: 67
End: 2022-01-20
Attending: EMERGENCY MEDICINE
Payer: COMMERCIAL

## 2022-01-20 ENCOUNTER — HOSPITAL ENCOUNTER (EMERGENCY)
Facility: CLINIC | Age: 67
Discharge: HOME | End: 2022-01-20
Attending: EMERGENCY MEDICINE | Admitting: EMERGENCY MEDICINE
Payer: COMMERCIAL

## 2022-01-20 VITALS
HEIGHT: 65 IN | TEMPERATURE: 97.3 F | RESPIRATION RATE: 17 BRPM | BODY MASS INDEX: 26.66 KG/M2 | SYSTOLIC BLOOD PRESSURE: 136 MMHG | DIASTOLIC BLOOD PRESSURE: 73 MMHG | HEART RATE: 60 BPM | OXYGEN SATURATION: 100 % | WEIGHT: 160 LBS

## 2022-01-20 DIAGNOSIS — R07.9 CHEST PAIN, UNSPECIFIED TYPE: ICD-10-CM

## 2022-01-20 LAB
ANION GAP SERPL CALCULATED.3IONS-SCNC: 5 MMOL/L (ref 3–14)
ATRIAL RATE - MUSE: 69 BPM
BASOPHILS # BLD AUTO: 0.1 10E3/UL (ref 0–0.2)
BASOPHILS NFR BLD AUTO: 1 %
BUN SERPL-MCNC: 24 MG/DL (ref 7–30)
CALCIUM SERPL-MCNC: 9.4 MG/DL (ref 8.5–10.1)
CHLORIDE BLD-SCNC: 104 MMOL/L (ref 94–109)
CO2 SERPL-SCNC: 29 MMOL/L (ref 20–32)
CREAT SERPL-MCNC: 0.62 MG/DL (ref 0.52–1.25)
D DIMER PPP FEU-MCNC: 0.46 UG/ML FEU (ref 0–0.5)
DIASTOLIC BLOOD PRESSURE - MUSE: NORMAL MMHG
EOSINOPHIL # BLD AUTO: 0.1 10E3/UL (ref 0–0.7)
EOSINOPHIL NFR BLD AUTO: 2 %
ERYTHROCYTE [DISTWIDTH] IN BLOOD BY AUTOMATED COUNT: 12.9 % (ref 10–15)
GFR SERPL CREATININE-BSD FRML MDRD: >90 ML/MIN/1.73M2
GLUCOSE BLD-MCNC: 103 MG/DL (ref 70–99)
HCT VFR BLD AUTO: 48.2 % (ref 35–53)
HGB BLD-MCNC: 14.9 G/DL (ref 11.7–17.7)
IMM GRANULOCYTES # BLD: 0 10E3/UL
IMM GRANULOCYTES NFR BLD: 0 %
INTERPRETATION ECG - MUSE: NORMAL
LYMPHOCYTES # BLD AUTO: 1.7 10E3/UL (ref 0.8–5.3)
LYMPHOCYTES NFR BLD AUTO: 31 %
MCH RBC QN AUTO: 27.9 PG (ref 26.5–33)
MCHC RBC AUTO-ENTMCNC: 30.9 G/DL (ref 31.5–36.5)
MCV RBC AUTO: 90 FL (ref 78–100)
MONOCYTES # BLD AUTO: 0.4 10E3/UL (ref 0–1.3)
MONOCYTES NFR BLD AUTO: 8 %
NEUTROPHILS # BLD AUTO: 3.1 10E3/UL (ref 1.6–8.3)
NEUTROPHILS NFR BLD AUTO: 58 %
NRBC # BLD AUTO: 0 10E3/UL
NRBC BLD AUTO-RTO: 0 /100
P AXIS - MUSE: 46 DEGREES
PLATELET # BLD AUTO: 287 10E3/UL (ref 150–450)
POTASSIUM BLD-SCNC: 4.2 MMOL/L (ref 3.4–5.3)
PR INTERVAL - MUSE: 178 MS
QRS DURATION - MUSE: 88 MS
QT - MUSE: 394 MS
QTC - MUSE: 422 MS
R AXIS - MUSE: 1 DEGREES
RBC # BLD AUTO: 5.34 10E6/UL (ref 3.8–5.9)
SODIUM SERPL-SCNC: 138 MMOL/L (ref 133–144)
SYSTOLIC BLOOD PRESSURE - MUSE: NORMAL MMHG
T AXIS - MUSE: 27 DEGREES
TROPONIN I SERPL HS-MCNC: 4 NG/L
TROPONIN I SERPL HS-MCNC: 4 NG/L
VENTRICULAR RATE- MUSE: 69 BPM
WBC # BLD AUTO: 5.3 10E3/UL (ref 4–11)

## 2022-01-20 PROCEDURE — 250N000013 HC RX MED GY IP 250 OP 250 PS 637: Performed by: EMERGENCY MEDICINE

## 2022-01-20 PROCEDURE — 36415 COLL VENOUS BLD VENIPUNCTURE: CPT | Performed by: EMERGENCY MEDICINE

## 2022-01-20 PROCEDURE — 82310 ASSAY OF CALCIUM: CPT | Performed by: EMERGENCY MEDICINE

## 2022-01-20 PROCEDURE — 85025 COMPLETE CBC W/AUTO DIFF WBC: CPT | Performed by: EMERGENCY MEDICINE

## 2022-01-20 PROCEDURE — 84484 ASSAY OF TROPONIN QUANT: CPT | Performed by: EMERGENCY MEDICINE

## 2022-01-20 PROCEDURE — 85379 FIBRIN DEGRADATION QUANT: CPT | Performed by: EMERGENCY MEDICINE

## 2022-01-20 PROCEDURE — 93005 ELECTROCARDIOGRAM TRACING: CPT

## 2022-01-20 PROCEDURE — 71046 X-RAY EXAM CHEST 2 VIEWS: CPT

## 2022-01-20 PROCEDURE — 99285 EMERGENCY DEPT VISIT HI MDM: CPT | Mod: 25

## 2022-01-20 RX ORDER — ACETAMINOPHEN 500 MG
1000 TABLET ORAL ONCE
Status: COMPLETED | OUTPATIENT
Start: 2022-01-20 | End: 2022-01-20

## 2022-01-20 RX ORDER — ASPIRIN 325 MG
325 TABLET ORAL ONCE
Status: COMPLETED | OUTPATIENT
Start: 2022-01-20 | End: 2022-01-20

## 2022-01-20 RX ORDER — NITROGLYCERIN 0.4 MG/1
0.4 TABLET SUBLINGUAL EVERY 5 MIN PRN
Status: DISCONTINUED | OUTPATIENT
Start: 2022-01-20 | End: 2022-01-20 | Stop reason: HOSPADM

## 2022-01-20 RX ADMIN — ASPIRIN 325 MG ORAL TABLET 325 MG: 325 PILL ORAL at 09:48

## 2022-01-20 RX ADMIN — ACETAMINOPHEN 1000 MG: 500 TABLET, FILM COATED ORAL at 10:07

## 2022-01-20 RX ADMIN — NITROGLYCERIN 0.4 MG: 0.4 TABLET SUBLINGUAL at 09:49

## 2022-01-20 ASSESSMENT — ENCOUNTER SYMPTOMS
WOUND: 1
VOMITING: 1
DIAPHORESIS: 0
NAUSEA: 1
NUMBNESS: 0
COUGH: 0
CHILLS: 0
FEVER: 0

## 2022-01-20 ASSESSMENT — MIFFLIN-ST. JEOR: SCORE: 1276.64

## 2022-01-20 NOTE — ED TRIAGE NOTES
"Pt with c/o \"heartburn\" that started last night. Pt got up to go to the bathroom went to sit down on toilet and woke up on the floor. States hitting her face. Denies SOB, nausea, diaphoresis. No cardiac hx.. Reports 'chest pressure\".   "

## 2022-01-20 NOTE — ED NOTES
Patient  reviewed discharge.  Discussed printed discharge information.  Follow-up appts reviewed.   What s/s warrant return to the ER.    Importance of social distancing, good hand hygiene, and wearing a mask when in public spaces.

## 2022-01-20 NOTE — ED PROVIDER NOTES
"  History     Chief Complaint:  Chest Pain     The history is provided by the patient.      Brittany Chan is a 66 year old adult with history of hypertension, hyperlipidemia who presents with chest pain which began approximately 8.5 hours ago at 0100 this morning. She reports that she was awakened by \"excruciating, burning\" chest pain which she describes as heartburn. Shortly after this began, she walked to the bathroom and before she was able to get to the toilet, she had a syncopal event. The next thing she remembers is regaining consciousness in the bathroom with her  by her side. She states that she sustained a wound to her lip due to hitting her face during the syncopal episode. After regaining consciousness, she began feeling nauseous and she subsequently vomited a small amount. She denies experiencing any chills or diaphoresis around the time of the episode.    Brittany states that she felt well before she went to bed last night. She does mention that she had 2 glasses of wine and some pizza shortly before going to bed, and she feels like this may have caused some heart burn. She presents to the ED this morning because her chest pain has persisted and this concerned her. Here in the ED, she states that her chest pain is mild and no longer feels like burning. She now describes her pain as chest \"pressure.\" The chest pain has been non-radiating. She has no persistent nausea. No recent cough, fever, chills, leg swelling, numbness. Denies personal or family history of heart disease. Denies personal history of diabetes but she notes that she takes metformin for PCOS. She does not take a daily aspirin. She is a former smoker.    Review of Systems   Constitutional: Negative for chills, diaphoresis and fever.   Respiratory: Negative for cough.    Cardiovascular: Positive for chest pain. Negative for leg swelling.   Gastrointestinal: Positive for nausea (resolved) and vomiting.   Skin: Positive for wound. " "  Neurological: Positive for syncope. Negative for numbness.   All other systems reviewed and are negative.    Allergies:  Lisinopril  Thimerosal    Medications:  Estradiol  Hydrochlorothiazide  Claritin  Melatonin  Metformin  Omeprazole  Ranitidine  Zocor    Past Medical History:     Cancer  Anesthesia complication  Depression  GERD  Heart disease  Hyperlipidemia  Hypertension  Pelvic floor relaxation  Migraine  PCOS  Hemorrhoid  Stress incontinence  Anxiety  Osteoarthritis  Eczema     Past Surgical History:    Breast lumpectomy, left  Colonoscopy x2  Sacrocolpopexy  EGD  Rotator cuff repair, right  Tonsillectomy  Adenoidectomy      Family History:    Father: hypertension, prostate cancer, eye disorder  Mother: nut allergy, arthritis, osteoporosis  Sister: obesity, PCOS  Daughter: anxiety    Social History:  The patient presents to the ED alone.  The patient presents to the ED via car.  Former smoker.    Physical Exam     Patient Vitals for the past 24 hrs:   BP Temp Temp src Pulse Resp SpO2 Height Weight   01/20/22 1224 -- -- -- 60 17 100 % -- --   01/20/22 1200 -- -- -- 58 16 100 % -- --   01/20/22 1130 136/73 -- -- 53 10 100 % -- --   01/20/22 1030 137/76 -- -- 58 14 99 % -- --   01/20/22 1025 (!) 144/81 -- -- 64 14 100 % -- --   01/20/22 1020 132/81 -- -- 58 9 100 % -- --   01/20/22 1015 (!) 136/101 -- -- 70 23 100 % -- --   01/20/22 1010 126/77 -- -- 60 14 98 % -- --   01/20/22 0955 125/78 -- -- 69 17 99 % -- --   01/20/22 0945 (!) 142/79 -- -- 71 19 100 % -- --   01/20/22 0924 (!) 191/99 97.3  F (36.3  C) Temporal 81 16 100 % -- --   01/20/22 0923 -- -- -- -- -- -- 1.651 m (5' 5\") 72.6 kg (160 lb)     Physical Exam  General: Patient is awake, alert and interactive when I enter the room  Head: The scalp, face, and head appear normal  Eyes: The pupils are equal, round, and reactive to light. Conjunctivae and sclerae are normal  ENT: External acoustic canals are normal. The oropharynx is normal without erythema. " Uvula is in the midline  Neck: Normal range of motion.   CV: Regular rate and rhythm.   Resp: Lungs are clear without wheezes or rales. No respiratory distress.   GI: Abdomen is soft, no rigidity, guarding, or rebound. No distension. No tenderness to palpation in any quadrant.     MS: Normal tone. Joints grossly normal without effusions. No asymmetric leg swelling, calf or thigh tenderness.    Skin: No rash or lesions noted. Normal capillary refill noted  Neuro: Speech is normal and fluent. Face is symmetric. Moving all extremities.   Psych:  Normal affect.  Appropriate interactions.    Emergency Department Course     ECG:  ECG taken at 0938, ECG read at 0941  Normal sinus rhythm  Minimal voltage criteria for LVH, may be normal variant  Borderline ECG  No significant change as compared to prior, dated 12/11/20.  Rate 69 bpm. HI interval 178 ms. QRS duration 88 ms. QT/QTc 394/422 ms. P-R-T axes 46 1 27.     Imaging:    XR Chest 2 Views  No radiographic evidence of acute chest abnormality.   Report per radiology    Laboratory:  Labs Ordered and Resulted from Time of ED Arrival to Time of ED Departure   BASIC METABOLIC PANEL - Abnormal       Result Value    Sodium 138      Potassium 4.2      Chloride 104      Carbon Dioxide (CO2) 29      Anion Gap 5      Urea Nitrogen 24      Creatinine 0.62      Calcium 9.4      Glucose 103 (*)     GFR Estimate >90     CBC WITH PLATELETS AND DIFFERENTIAL - Abnormal    WBC Count 5.3      RBC Count 5.34      Hemoglobin 14.9      Hematocrit 48.2      MCV 90      MCH 27.9      MCHC 30.9 (*)     RDW 12.9      Platelet Count 287      % Neutrophils 58      % Lymphocytes 31      % Monocytes 8      % Eosinophils 2      % Basophils 1      % Immature Granulocytes 0      NRBCs per 100 WBC 0      Absolute Neutrophils 3.1      Absolute Lymphocytes 1.7      Absolute Monocytes 0.4      Absolute Eosinophils 0.1      Absolute Basophils 0.1      Absolute Immature Granulocytes 0.0      Absolute NRBCs 0.0      D DIMER QUANTITATIVE - Normal    D-Dimer Quantitative 0.46     TROPONIN I - Normal    Troponin I High Sensitivity 4     TROPONIN I - Normal    Troponin I High Sensitivity 4        Emergency Department Course:    Reviewed:  I reviewed nursing notes, vitals, past medical history, Care Everywhere    Assessments:  0925 I obtained history and examined the patient as noted above.   1021 I rechecked the patient and explained findings. She is currently chest pain free.  1219 Patient was rechecked and updated.    Interventions:  0938 Aspirin 325 mg PO  0949 Nitroglycerin 0.4 mg sublingual  1007 Tylenol 1 g PO    Disposition:  The patient was discharged to home.     Impression & Plan     Medical Decision Making:  Brittany Chan is a 66 year old adult who presents to the emergency department for evaluation of chest pain for greater than 8 hours. Initial laboratory and imaging tests have come back normal after greater than eight hours of constant pain without progression of symptoms or other new concerning symptoms.  No sinister cause for syncope was noted.  Possible that this may have represented vasovagal syncope.  There is no clinical, laboratory, or radiographic evidence of pulmonary embolism, aortic dissection, pneumonia, pneumothorax or cardiac ischemia.  Other etiologies of chest pain include but are not limited to chostochondritis, esophageal spasm or GI source, pleuritis, referred pain, anxiety.  I have no suspicion of unstable angina at this point.  I feel the patient is low risk clinically and is low risk per the HEART score.  I believe the patient is safe for outpatient stress testing.  I will order an outpatient stress echo within 72 hours with plans for close PCP follow-up in the next few days.  The patient agrees with the plan and all questions were answered.    Diagnosis:    ICD-10-CM    1. Chest pain, unspecified type  R07.9 Exercise Stress Echocardiogram     Scribe Disclosure:  Claire MAXWELL, am serving  as a scribe at 9:25 AM on 1/20/2022 to document services personally performed by Pierce Monge MD based on my observations and the provider's statements to me.        Pierce Monge MD  01/21/22 0930

## 2022-01-24 ENCOUNTER — HOSPITAL ENCOUNTER (OUTPATIENT)
Dept: CARDIOLOGY | Facility: CLINIC | Age: 67
Discharge: HOME | End: 2022-01-24
Attending: EMERGENCY MEDICINE | Admitting: EMERGENCY MEDICINE
Payer: COMMERCIAL

## 2022-01-24 DIAGNOSIS — R07.9 CHEST PAIN, UNSPECIFIED TYPE: ICD-10-CM

## 2022-01-24 PROCEDURE — 93350 STRESS TTE ONLY: CPT | Mod: 26 | Performed by: INTERNAL MEDICINE

## 2022-01-24 PROCEDURE — 93321 DOPPLER ECHO F-UP/LMTD STD: CPT | Mod: TC

## 2022-01-24 PROCEDURE — 93018 CV STRESS TEST I&R ONLY: CPT | Performed by: INTERNAL MEDICINE

## 2022-01-24 PROCEDURE — 93325 DOPPLER ECHO COLOR FLOW MAPG: CPT | Mod: TC

## 2022-01-24 PROCEDURE — 93321 DOPPLER ECHO F-UP/LMTD STD: CPT | Mod: 26 | Performed by: INTERNAL MEDICINE

## 2022-01-24 PROCEDURE — 93016 CV STRESS TEST SUPVJ ONLY: CPT | Performed by: INTERNAL MEDICINE

## 2022-01-24 PROCEDURE — 93325 DOPPLER ECHO COLOR FLOW MAPG: CPT | Mod: 26 | Performed by: INTERNAL MEDICINE

## 2022-01-26 ENCOUNTER — TRANSFERRED RECORDS (OUTPATIENT)
Dept: HEALTH INFORMATION MANAGEMENT | Facility: CLINIC | Age: 67
End: 2022-01-26
Payer: COMMERCIAL

## 2022-02-03 ENCOUNTER — OFFICE VISIT (OUTPATIENT)
Dept: FAMILY MEDICINE | Facility: CLINIC | Age: 67
End: 2022-02-03
Payer: COMMERCIAL

## 2022-02-03 VITALS
TEMPERATURE: 98.3 F | RESPIRATION RATE: 15 BRPM | HEART RATE: 76 BPM | BODY MASS INDEX: 27.24 KG/M2 | HEIGHT: 65 IN | SYSTOLIC BLOOD PRESSURE: 112 MMHG | DIASTOLIC BLOOD PRESSURE: 78 MMHG | OXYGEN SATURATION: 98 % | WEIGHT: 163.5 LBS

## 2022-02-03 DIAGNOSIS — K21.9 GASTROESOPHAGEAL REFLUX DISEASE WITHOUT ESOPHAGITIS: ICD-10-CM

## 2022-02-03 DIAGNOSIS — K21.9 GASTROESOPHAGEAL REFLUX DISEASE, UNSPECIFIED WHETHER ESOPHAGITIS PRESENT: ICD-10-CM

## 2022-02-03 DIAGNOSIS — Z11.59 NEED FOR HEPATITIS B SCREENING TEST: ICD-10-CM

## 2022-02-03 DIAGNOSIS — Z91.89 HIGH RISK OF CARDIAC EVENT: Primary | ICD-10-CM

## 2022-02-03 LAB — HBV CORE AB SERPL QL IA: NONREACTIVE

## 2022-02-03 PROCEDURE — 99214 OFFICE O/P EST MOD 30 MIN: CPT | Performed by: FAMILY MEDICINE

## 2022-02-03 PROCEDURE — 86704 HEP B CORE ANTIBODY TOTAL: CPT | Performed by: FAMILY MEDICINE

## 2022-02-03 PROCEDURE — 36415 COLL VENOUS BLD VENIPUNCTURE: CPT | Performed by: FAMILY MEDICINE

## 2022-02-03 PROCEDURE — 86706 HEP B SURFACE ANTIBODY: CPT | Performed by: FAMILY MEDICINE

## 2022-02-03 RX ORDER — HYDROCODONE BITARTRATE AND ACETAMINOPHEN 5; 325 MG/1; MG/1
TABLET ORAL
COMMUNITY
Start: 2022-02-01 | End: 2022-08-23

## 2022-02-03 RX ORDER — FAMOTIDINE 20 MG/1
20 TABLET, FILM COATED ORAL 2 TIMES DAILY
COMMUNITY

## 2022-02-03 ASSESSMENT — PAIN SCALES - GENERAL: PAINLEVEL: MODERATE PAIN (4)

## 2022-02-03 ASSESSMENT — MIFFLIN-ST. JEOR: SCORE: 1292.51

## 2022-02-03 NOTE — PROGRESS NOTES
Assessment & Plan     High risk of cardiac event  Recent syncopy event, negative work up thus far, will check CT scan calcium score, if high, will refer to cards, no baby aspirin recommended for now  - CT Coronary Calcium Scan; Future    Need for hepatitis B screening test  Pt asking for titer, is traveling soon [place]  - Hepatitis B core antibody; Future  - Hepatitis B core antibody        Gastroesophageal reflux disease without esophagitis  Increase to BID, if not improving, she will need to see GI, pt will let me know  - omeprazole (PRILOSEC) 20 MG DR capsule; Take 1 capsule (20 mg) by mouth 2 times daily    Ordering of each unique test  Prescription drug management         Work on weight loss  Regular exercise    No follow-ups on file.    Sadaf Polanco MD  [facility] BUCK Soto is a 66 year old who presents for the following health issues     HPI     ED/ Followup:    Facility:  [facility] ED  Date of visit: 1/20/22  Reason for visit: Chest pain  Current Status: Feeling fine now.    No med changes since discharge but they recommended baby aspirin which she hasn't started that.  Fainted after voiding and next day went to ED with chest pressure.  Taking prilosec morning and pepcid at bedtime.  That night she did have 2 glasses of red wine.      Cardiac echo done, stress echo    The ASCVD Risk score (Cindy BECCA Cardoza., et al., 2013) failed to calculate for the following reasons:    The valid HDL cholesterol range is 20 to 100 mg/dL  The ASCVD Risk score ([place]estr[place] HUDSON Jr., et al., 2013) failed to calculate for the following reasons:    The valid HDL cholesterol range is 20 to 100 mg/dL      Vaccine questions, hep B vaccine in the distant past, reboosted with the whole series.     Traveling over 3 weeks  March 25th leaving, going to [place]      Review of Systems   Constitutional, HEENT, cardiovascular, pulmonary, GI, , musculoskeletal,  "neuro, skin, endocrine and psych systems are negative, except as otherwise noted.      Objective    /78 (BP Location: Right arm, Patient Position: Sitting, Cuff Size: Adult Regular)   Pulse 76   Temp 98.3  F (36.8  C) (Tympanic)   Resp 15   Ht 1.651 m (5' 5\")   Wt 74.2 kg (163 lb 8 oz)   LMP 10/05/2012   SpO2 98%   BMI 27.21 kg/m    Body mass index is 27.21 kg/m .  Physical Exam   GENERAL: healthy, alert and no distress  EYES: Eyes grossly normal to inspection,  and conjunctivae and sclerae normal  HENT: ear canals and TM's normal, nose and mouth without ulcers or lesions  NECK: no adenopathy, no asymmetry, masses, or scars and thyroid normal to palpation  RESP: lungs clear to auscultation - no rales, rhonchi or wheezes  CV: regular rate and rhythm, normal S1 S2, no S3 or S4, no murmur, click or rub, no peripheral edema and peripheral pulses strong  ABDOMEN: soft, nontender, no hepatosplenomegaly, no masses and bowel sounds normal  MS: no gross musculoskeletal defects noted, no edema  SKIN: no suspicious lesions or rashes  NEURO: Normal strength and tone, mentation intact and speech normal  PSYCH: mentation appears normal, affect normal/bright                "

## 2022-02-04 LAB — HBV SURFACE AB SERPL IA-ACNC: 58.16 M[IU]/ML

## 2022-02-11 ENCOUNTER — HOSPITAL ENCOUNTER (OUTPATIENT)
Dept: CARDIOLOGY | Facility: CLINIC | Age: 67
Discharge: HOME | End: 2022-02-11
Attending: FAMILY MEDICINE | Admitting: FAMILY MEDICINE

## 2022-02-11 DIAGNOSIS — Z91.89 HIGH RISK OF CARDIAC EVENT: ICD-10-CM

## 2022-02-11 PROCEDURE — 75571 CT HRT W/O DYE W/CA TEST: CPT

## 2022-02-11 PROCEDURE — 75571 CT HRT W/O DYE W/CA TEST: CPT | Mod: 26 | Performed by: INTERNAL MEDICINE

## 2022-04-21 ENCOUNTER — TELEPHONE (OUTPATIENT)
Dept: GASTROENTEROLOGY | Facility: CLINIC | Age: 67
End: 2022-04-21
Payer: MEDICARE

## 2022-04-21 NOTE — TELEPHONE ENCOUNTER
----- Message from Shadia Hernadez RN sent at 4/21/2022  2:39 PM CDT -----  Regarding: FW: labs?  Hi, can you please call patient to report that she doesn't need labs for Dr. Agrawal visit on 5/3? If she has other labs in that appt, ok to keep, but if only Tanja's labs, ok to cancel the lab.  Thanks!  Shadia  ----- Message -----  From: Aditya Agrawal MD  Sent: 4/21/2022   2:10 PM CDT  To: Shadia Hernadez RN  Subject: RE: labs?                                        This is a strange consult... I don't think we need any further labs right now.  ALISA Farmer  ----- Message -----  From: Shadia Hernadez RN  Sent: 4/20/2022   3:46 PM CDT  To: Aditya Agrawal MD, Shadia Hernadez RN  Subject: labs?                                            Hi, she is scheduled for a new hepatology visit with you in early May. She has labs scheduled, but not sure what to order, as the only thing I am going off of is the referral for abnormal CT.  Here is the report findings:    No abnormally enlarged mediastinal lymph nodes. Solid organs in the  upper abdomen are grossly unremarkable. Rounded calcification at the  medial aspect of the intrahepatic IVC. Uncertain if this is a  granuloma within the liver or perhaps a chronic DVT, nonocclusive in  the IVC. Likely liver in origin and relatively small. Does not appear  it would affect blood flow in any way. No acute osseous abnormality. 4  mm nodule in the left lower lobe. Subpleural nodule also in the left  lower lobe is 4 mm. May consider follow-up CT exam in 12 months    Please advise if she needs labs prior and which ones, thanks!  Shadia

## 2022-04-21 NOTE — TELEPHONE ENCOUNTER
LVM for PT to call [phone] to cancel the 5.3 lab appt if they are for Dr. Agrawal.  Dr. Agrawal said they are not needed. Go ahead and cancel lab appt only.

## 2022-05-03 ENCOUNTER — TELEPHONE (OUTPATIENT)
Dept: GASTROENTEROLOGY | Facility: CLINIC | Age: 67
End: 2022-05-03

## 2022-05-03 ENCOUNTER — OFFICE VISIT (OUTPATIENT)
Dept: GASTROENTEROLOGY | Facility: CLINIC | Age: 67
End: 2022-05-03
Attending: FAMILY MEDICINE
Payer: MEDICARE

## 2022-05-03 VITALS
OXYGEN SATURATION: 100 % | DIASTOLIC BLOOD PRESSURE: 80 MMHG | HEIGHT: 65 IN | BODY MASS INDEX: 27.22 KG/M2 | WEIGHT: 163.4 LBS | SYSTOLIC BLOOD PRESSURE: 137 MMHG | HEART RATE: 64 BPM

## 2022-05-03 DIAGNOSIS — R07.9 CHEST PAIN, UNSPECIFIED TYPE: ICD-10-CM

## 2022-05-03 DIAGNOSIS — R93.2 ABNORMAL CT SCAN, LIVER: ICD-10-CM

## 2022-05-03 DIAGNOSIS — R12 HEARTBURN: Primary | ICD-10-CM

## 2022-05-03 PROCEDURE — 99204 OFFICE O/P NEW MOD 45 MIN: CPT | Performed by: INTERNAL MEDICINE

## 2022-05-03 RX ORDER — MOMETASONE FUROATE 1 MG/G
CREAM TOPICAL
COMMUNITY
Start: 2022-04-26 | End: 2024-05-12

## 2022-05-03 RX ORDER — CALCIUM CARBONATE 500 MG/1
1 TABLET, CHEWABLE ORAL 2 TIMES DAILY
COMMUNITY
End: 2023-04-14

## 2022-05-03 RX ORDER — DIMENHYDRINATE 50 MG
1 TABLET ORAL DAILY
COMMUNITY

## 2022-05-03 RX ORDER — ESTRADIOL 10 UG/1
INSERT VAGINAL
COMMUNITY
Start: 2022-03-04 | End: 2022-11-29

## 2022-05-03 RX ORDER — MULTIPLE VITAMINS W/ MINERALS TAB 9MG-400MCG
1 TAB ORAL DAILY
COMMUNITY

## 2022-05-03 RX ORDER — DOXYCYCLINE 50 MG/1
TABLET ORAL
COMMUNITY
Start: 2022-04-26 | End: 2023-04-14

## 2022-05-03 ASSESSMENT — PAIN SCALES - GENERAL: PAINLEVEL: MILD PAIN (2)

## 2022-05-03 NOTE — NURSING NOTE
"  Chief Complaint   Patient presents with     New Patient     Abnormal CT scan, liver       Vitals:    05/03/22 0913   BP: 137/80   BP Location: Left arm   Patient Position: Sitting   Cuff Size: Adult Regular   Pulse: 64   SpO2: 100%   Weight: 74.1 kg (163 lb 6.4 oz)   Height: 1.651 m (5' 5\")       Body mass index is 27.19 kg/m .    CHITO SteveF    "

## 2022-05-03 NOTE — TELEPHONE ENCOUNTER
Brittany had 5/3/22 Hepatology visit with Dr. Agrawal. Recommendations for her are MRI and endoscopy.  Sent patient a MyChart with scheduling information. Will follow-up at a later date.

## 2022-05-03 NOTE — LETTER
5/3/2022         RE: Brittany Chan  [id number]  [facility] [address]        Dear Colleague,    Thank you for referring your patient, Brittany Chan, to the [facility] [place]. Please see a copy of my visit note below.    [facility]       Hepatology Clinic    Date of Service: 5/3/2022       Primary Care Provider: Sadaf Polanco    History of Present Illness     Ms. Chan is sent in consultation because of a possible calcified liver lesion.  She is accompanied by her .    The patient underwent cardiac CT imaging recently because of atypical chest pain.  This did not apparently show any cardiac issues, but a calcification was noted in either the liver or the IVC.  The significance of this is unclear, and the imaging did not provide good detail of either the liver or IVC.    She reports no history of liver problems.  There is no family history of liver disease.  She has a previously negative hepatitis C antibody and is immune to hepatitis B.  She drinks about 1 glass of wine daily.  She has no history of blood clots.  She is unaware of any systemic infection such as TB or histoplasmosis.    She has a history of chronic reflux symptoms (many years) and is currently taking a PPI twice daily with some breakthrough symptoms.  He reports no dysphagia.  She has had episodic unexplained chest pain recently, but this apparently is not frequent (and is not associated with exertion).  She has not noted GI bleeding or other GI symptoms.    Overall she feels well.  She is a retired NICU nurse.  She lives in [place] with her .      Past Medical History:  Past Medical History:   Diagnosis Date     Abnormal Pap smear, can't excl hi gd sq intraepithelial lesion (ASC-H) 09/29/11     Arthritis     mother     Cancer (H)     prostate- father     Complication of anesthesia     Gets tachycardic from epi in Dental numbing injections      Depressive disorder     sisters     GERD (gastroesophageal reflux disease)      Heart disease     father     High cholesterol      Hypertension     father     Incontinence of urine in female      Pelvic floor relaxation        Patient Active Problem List   Diagnosis     Loss of height     Family history of other endocrine and metabolic diseases     Flatulence, eructation, and gas pain     Migraine     Esophageal reflux     PCOS (polycystic ovarian syndrome)     Hemorrhoid     Hyperlipidemia with target LDL less than 130     Family history of diabetes mellitus     Atypical squamous cells cannot exclude high grade squamous intraepithelial lesion on cytologic smear of cervix (ASC-H)     Stress incontinence     Overweight (BMI 25.0-29.9)     Anxiety     Post-operative state     Osteoarthritis of finger, unspecified laterality     Eczema, unspecified type     Essential hypertension       Surgical History:  Past Surgical History:   Procedure Laterality Date     BREAST LUMPECTOMY, RT/LT      lt fibroadenoma     COLONOSCOPY       COLONOSCOPY Left 2019    Procedure: COLONOSCOPY;  Surgeon: Renita Pablo MD;  Location:  GI     DAVINCI PELVIC PROCEDURE N/A 2020    Procedure: Robotic assisted sacrocolpopexy, supracervical hysterectomy with bilateral salpingo-oophorectomy, abdominal enterocele repair, midurethral sling, cystoscopy, and  posterior repair;  Surgeon: Gail Yang MD;  Location:  OR     ESOPHAGOSCOPY, GASTROSCOPY, DUODENOSCOPY (EGD), COMBINED N/A 2016    Procedure: COMBINED ESOPHAGOSCOPY, GASTROSCOPY, DUODENOSCOPY (EGD);  Surgeon: Feroz Ramirez MD;  Location:  GI     ORTHOPEDIC SURGERY Right 2014    rotator cuff repair     TONSILLECTOMY & ADENOIDECTOMY         Social History:  Social History     Tobacco Use     Smoking status: Former Smoker     Packs/day: 0.00     Years: 0.00     Pack years: 0.00     Quit date: 1980     Years since quittin.3     Smokeless tobacco:  Never Used   Vaping Use     Vaping Use: Never used   Substance Use Topics     Alcohol use: Yes     Comment: 5 a week     Drug use: No       Family History:  Family History   Problem Relation Age of Onset     Hypertension Father      Prostate Cancer Father      Eye Disorder Father         macular degeneration     Diabetes Father      Allergies Mother         nuts     Arthritis Mother      Osteoporosis Mother      Hyperlipidemia Mother      Obesity Sister      Gynecology Sister         polycystic ovaries     Obesity Sister      Diabetes Nephew         type 2     Anxiety Disorder Daughter      Diabetes Nephew        Medications:  Current Outpatient Medications   Medication     calcium carbonate (TUMS) 500 MG chewable tablet     Cholecalciferol (VITAMIN D) 1000 UNIT capsule     conjugated estrogens (PREMARIN) 0.625 MG/GM vaginal cream     doxycycline monohydrate (ADOXA) 50 MG tablet     famotidine (PEPCID) 20 MG tablet     fish oil-omega-3 fatty acids 1000 MG capsule     Flaxseed, Linseed, (FLAX SEED OIL) 1000 MG capsule     hydrochlorothiazide (MICROZIDE) 12.5 MG capsule     ibuprofen (ADVIL/MOTRIN) 600 MG tablet     Loratadine (CLARITIN PO)     melatonin 3 MG tablet     metFORMIN (GLUCOPHAGE-XR) 500 MG 24 hr tablet     mometasone (ELOCON) 0.1 % external cream     multivitamin w/minerals (MULTI-VITAMIN) tablet     omeprazole (PRILOSEC) 20 MG DR capsule     Probiotic Product (PROBIOTIC PO)     simvastatin (ZOCOR) 20 MG tablet     COLLAGEN PO     estradiol (VAGIFEM) 10 MCG TABS vaginal tablet     HYDROcodone-acetaminophen (NORCO) 5-325 MG tablet     mupirocin (BACTROBAN) 2 % external ointment     RANITIDINE HCL PO     triamcinolone (KENALOG) 0.1 % external ointment     No current facility-administered medications for this visit.         Objective:         Vitals:    05/03/22 0913   BP: 137/80   BP Location: Left arm   Patient Position: Sitting   Cuff Size: Adult Regular   Pulse: 64   SpO2: 100%   Weight: 74.1 kg (163 lb  "6.4 oz)   Height: 1.651 m (5' 5\")     Body mass index is 27.19 kg/m .     Physical Exam  Constitutional: Well-developed, well-nourished, in no apparent distress.        Labs and Diagnostic tests:  Lab Results   Component Value Date     01/20/2022     12/11/2020    POTASSIUM 4.2 01/20/2022    POTASSIUM 4.1 12/11/2020    CHLORIDE 104 01/20/2022    CHLORIDE 104 12/11/2020    CO2 29 01/20/2022    CO2 29 12/11/2020    BUN 24 01/20/2022    BUN 19 12/11/2020    CR 0.62 01/20/2022    CR 0.69 12/11/2020     Lab Results   Component Value Date    BILITOTAL 0.5 11/08/2021    BILITOTAL 0.4 10/07/2020    ALT 35 11/08/2021    ALT 41 10/07/2020    AST 21 11/08/2021    AST 24 10/07/2020    ALKPHOS 76 11/08/2021    ALKPHOS 70 10/07/2020     Lab Results   Component Value Date    ALBUMIN 3.7 11/08/2021    ALBUMIN 3.5 10/07/2020    PROTTOTAL 7.3 11/08/2021    PROTTOTAL 6.9 10/07/2020      Lab Results   Component Value Date    WBC 5.3 01/20/2022    WBC 4.1 10/07/2020    HGB 14.9 01/20/2022    HGB 12.4 12/15/2020    MCV 90 01/20/2022    MCV 88 10/07/2020     01/20/2022     10/07/2020     Recent CT images reviewed.      Assessment and Plan:    1.  Questionable liver lesion.  We discussed this at some length.  I suspect that this is a benign incidental abnormality without clinical significance.  However, the CT scan did not provide sufficient detail to rule out any associated abnormality.  The patient is very concerned, and would like to get further clarity.  I think it is reasonable to obtain an MRI to rule out any liver lesion (or vascular lesion in this area), and the patient is agreeable.  If the MRI does not show any concerning abnormalities, further follow-up is unlikely to be necessary.    2.  Chronic reflux symptoms.  The patient is having some breakthrough symptoms as well as chest pain.  I think it is reasonable to perform an endoscopy to assess whether she has overt evidence of esophagitis (and to screen " for Friend's).  The patient would like to proceed with this.    She had numerous questions that were addressed.    Follow Up:  If her testing is unremarkable, she does not need to follow-up here unless she has new GI or liver concerns.    About 30 minutes spent today with patient, reviewing results, and coordinating care.        Aditya Agrawal MD  Professor of Medicine  [facility]  Division of Gastroenterology, Hepatology, and Nutrition      Again, thank you for allowing me to participate in the care of your patient.        Sincerely,        Aditya Agrawal MD

## 2022-05-03 NOTE — TELEPHONE ENCOUNTER
Screening Questions  BlueKIND OF PREP RedLOCATION [review exclusion criteria] GreenSEDATION TYPE  1. Have you had a positive covid test in the last 90 days? N     2. Do you have a legal guardian or medical Power of ?  Are you able to give consent for your medical care?N (Sedation review/consideration needed)    3. Are you active on mychart? Y    4. What insurance is in the chart? Medicare     3.   Ordering/Referring Provider: Tanja    4. BMI 27.1  [BMI OVER 40-EXTENDED PREP]  If greater than 40 review exclusion criteria [PAC APPT IF @ [facility]]        5.  Respiratory Screening :  [If yes to any of the following HOSPITAL setting only]     Do you use daily home oxygen? N    Do you have mod to severe Obstructive Sleep Apnea? N  [OKAY @ [facility] SH PH RI]   Do you have Pulmonary Hypertension? N     Do you have UNCONTROLLED asthma? N        6.   Have you had a heart or lung transplant? N      7.   Are you currently on dialysis? N [ If yes, G-PREP & HOSPITAL setting only]     8.   Do you have chronic kidney disease? N [ If yes, G-PREP ]    9.   Have you had a stroke or Transient ischemic attack (TIA - aka  mini stroke ) within 6 months?  N (If yes, please review exclusion criteria)    10.   In the past 6 months, have you had any heart related issues including cardiomyopathy or heart attack? N           If yes, did it require cardiac stenting or other implantable device? N      11.   Do you have any implantable devices in your body (pacemaker, defib, LVAD)? N (If yes, please review exclusion criteria)    12.   Do you take nitroglycerin? N           If yes, how often? N  (if yes, HOSPITAL setting ONLY)    13.   Are you currently taking any blood thinners? N           [IF YES, INFORM PATIENT TO FOLLOW UP W/ ORDERING PROVIDER FOR BRIDGING INSTRUCTIONS]     14.   Do you have a diagnosis of diabetes? N   [ If yes, G-PREP ]    15.   [FEMALES] Are you currently pregnant? N    If yes, how many weeks? N    16.   Are  you taking any prescription pain medications on a routine schedule?  N  [ If yes, EXTENDED PREP.] [If yes, MAC]    17.   Do you have any chemical dependencies such as alcohol, street drugs, or methadone?  N [If yes, MAC]    18.   Do you have any history of post-traumatic stress syndrome, severe anxiety or history of psychosis?  N  [If yes, MAC]    19.   Do you transfer independently?  Y    20.  On a regular basis do you go 3-5 days between bowel movements? N   [ If yes, EXTENDED PREP.]    21.   Preferred LOCAL Pharmacy for Pre Prescription Y     [id number] [place], [place] - [address] [address]      Scheduling Details      Caller : Brittany  (Please ask for phone number if not scheduled by patient)    Type of Procedure Scheduled: EGD  Which Colonoscopy Prep was Sent?:   DONALDO CF PATIENTS & GROEN'S PATIENTS NEEDS EXTENDED PREP  Surgeon: Tanja  Date of Procedure: 5-18  Location:       Sedation Type: CS  Conscious Sedation- Needs  for 6 hours after the procedure  MAC/General-Needs  for 24 hours after procedure    Pre-op Required at [facility] [place], [place] and OR for MAC sedation: Y  (advise patient they will need a pre-op prior to procedure -)      Informed patient they will need an adult  Y  Cannot take any type of public or medical transportation alone    Pre-Procedure Covid test to be completed at [facility] or Externally: Y LV lab 5-16    Confirmed Nurse will call to complete assessment Y    Additional comments:

## 2022-05-03 NOTE — PROGRESS NOTES
[facility]       Hepatology Clinic    Date of Service: 5/3/2022       Primary Care Provider: Sadaf Polanco    History of Present Illness     Ms. Chan is sent in consultation because of a possible calcified liver lesion.  She is accompanied by her .    The patient underwent cardiac CT imaging recently because of atypical chest pain.  This did not apparently show any cardiac issues, but a calcification was noted in either the liver or the IVC.  The significance of this is unclear, and the imaging did not provide good detail of either the liver or IVC.    She reports no history of liver problems.  There is no family history of liver disease.  She has a previously negative hepatitis C antibody and is immune to hepatitis B.  She drinks about 1 glass of wine daily.  She has no history of blood clots.  She is unaware of any systemic infection such as TB or histoplasmosis.    She has a history of chronic reflux symptoms (many years) and is currently taking a PPI twice daily with some breakthrough symptoms.  He reports no dysphagia.  She has had episodic unexplained chest pain recently, but this apparently is not frequent (and is not associated with exertion).  She has not noted GI bleeding or other GI symptoms.    Overall she feels well.  She is a retired NICU nurse.  She lives in [place] with her .      Past Medical History:  Past Medical History:   Diagnosis Date     Abnormal Pap smear, can't excl hi gd sq intraepithelial lesion (ASC-H) 09/29/11     Arthritis     mother     Cancer (H)     prostate- father     Complication of anesthesia     Gets tachycardic from epi in Dental numbing injections     Depressive disorder     sisters     GERD (gastroesophageal reflux disease)      Heart disease     father     High cholesterol      Hypertension     father     Incontinence of urine in female      Pelvic floor relaxation        Patient Active Problem List   Diagnosis      Loss of height     Family history of other endocrine and metabolic diseases     Flatulence, eructation, and gas pain     Migraine     Esophageal reflux     PCOS (polycystic ovarian syndrome)     Hemorrhoid     Hyperlipidemia with target LDL less than 130     Family history of diabetes mellitus     Atypical squamous cells cannot exclude high grade squamous intraepithelial lesion on cytologic smear of cervix (ASC-H)     Stress incontinence     Overweight (BMI 25.0-29.9)     Anxiety     Post-operative state     Osteoarthritis of finger, unspecified laterality     Eczema, unspecified type     Essential hypertension       Surgical History:  Past Surgical History:   Procedure Laterality Date     BREAST LUMPECTOMY, RT/LT  1978    lt fibroadenoma     COLONOSCOPY       COLONOSCOPY Left 2019    Procedure: COLONOSCOPY;  Surgeon: Renita Pablo MD;  Location:  GI     DAVINCI PELVIC PROCEDURE N/A 2020    Procedure: Robotic assisted sacrocolpopexy, supracervical hysterectomy with bilateral salpingo-oophorectomy, abdominal enterocele repair, midurethral sling, cystoscopy, and  posterior repair;  Surgeon: Gail Yang MD;  Location:  OR     ESOPHAGOSCOPY, GASTROSCOPY, DUODENOSCOPY (EGD), COMBINED N/A 2016    Procedure: COMBINED ESOPHAGOSCOPY, GASTROSCOPY, DUODENOSCOPY (EGD);  Surgeon: Feroz Ramirez MD;  Location:  GI     ORTHOPEDIC SURGERY Right 2014    rotator cuff repair     TONSILLECTOMY & ADENOIDECTOMY         Social History:  Social History     Tobacco Use     Smoking status: Former Smoker     Packs/day: 0.00     Years: 0.00     Pack years: 0.00     Quit date: 1980     Years since quittin.3     Smokeless tobacco: Never Used   Vaping Use     Vaping Use: Never used   Substance Use Topics     Alcohol use: Yes     Comment: 5 a week     Drug use: No       Family History:  Family History   Problem Relation Age of Onset     Hypertension Father      Prostate Cancer Father      Eye  "Disorder Father         macular degeneration     Diabetes Father      Allergies Mother         nuts     Arthritis Mother      Osteoporosis Mother      Hyperlipidemia Mother      Obesity Sister      Gynecology Sister         polycystic ovaries     Obesity Sister      Diabetes Nephew         type 2     Anxiety Disorder Daughter      Diabetes Nephew        Medications:  Current Outpatient Medications   Medication     calcium carbonate (TUMS) 500 MG chewable tablet     Cholecalciferol (VITAMIN D) 1000 UNIT capsule     conjugated estrogens (PREMARIN) 0.625 MG/GM vaginal cream     doxycycline monohydrate (ADOXA) 50 MG tablet     famotidine (PEPCID) 20 MG tablet     fish oil-omega-3 fatty acids 1000 MG capsule     Flaxseed, Linseed, (FLAX SEED OIL) 1000 MG capsule     hydrochlorothiazide (MICROZIDE) 12.5 MG capsule     ibuprofen (ADVIL/MOTRIN) 600 MG tablet     Loratadine (CLARITIN PO)     melatonin 3 MG tablet     metFORMIN (GLUCOPHAGE-XR) 500 MG 24 hr tablet     mometasone (ELOCON) 0.1 % external cream     multivitamin w/minerals (MULTI-VITAMIN) tablet     omeprazole (PRILOSEC) 20 MG DR capsule     Probiotic Product (PROBIOTIC PO)     simvastatin (ZOCOR) 20 MG tablet     COLLAGEN PO     estradiol (VAGIFEM) 10 MCG TABS vaginal tablet     HYDROcodone-acetaminophen (NORCO) 5-325 MG tablet     mupirocin (BACTROBAN) 2 % external ointment     RANITIDINE HCL PO     triamcinolone (KENALOG) 0.1 % external ointment     No current facility-administered medications for this visit.         Objective:         Vitals:    05/03/22 0913   BP: 137/80   BP Location: Left arm   Patient Position: Sitting   Cuff Size: Adult Regular   Pulse: 64   SpO2: 100%   Weight: 74.1 kg (163 lb 6.4 oz)   Height: 1.651 m (5' 5\")     Body mass index is 27.19 kg/m .     Physical Exam  Constitutional: Well-developed, well-nourished, in no apparent distress.        Labs and Diagnostic tests:  Lab Results   Component Value Date     01/20/2022     " 12/11/2020    POTASSIUM 4.2 01/20/2022    POTASSIUM 4.1 12/11/2020    CHLORIDE 104 01/20/2022    CHLORIDE 104 12/11/2020    CO2 29 01/20/2022    CO2 29 12/11/2020    BUN 24 01/20/2022    BUN 19 12/11/2020    CR 0.62 01/20/2022    CR 0.69 12/11/2020     Lab Results   Component Value Date    BILITOTAL 0.5 11/08/2021    BILITOTAL 0.4 10/07/2020    ALT 35 11/08/2021    ALT 41 10/07/2020    AST 21 11/08/2021    AST 24 10/07/2020    ALKPHOS 76 11/08/2021    ALKPHOS 70 10/07/2020     Lab Results   Component Value Date    ALBUMIN 3.7 11/08/2021    ALBUMIN 3.5 10/07/2020    PROTTOTAL 7.3 11/08/2021    PROTTOTAL 6.9 10/07/2020      Lab Results   Component Value Date    WBC 5.3 01/20/2022    WBC 4.1 10/07/2020    HGB 14.9 01/20/2022    HGB 12.4 12/15/2020    MCV 90 01/20/2022    MCV 88 10/07/2020     01/20/2022     10/07/2020     Recent CT images reviewed.      Assessment and Plan:    1.  Questionable liver lesion.  We discussed this at some length.  I suspect that this is a benign incidental abnormality without clinical significance.  However, the CT scan did not provide sufficient detail to rule out any associated abnormality.  The patient is very concerned, and would like to get further clarity.  I think it is reasonable to obtain an MRI to rule out any liver lesion (or vascular lesion in this area), and the patient is agreeable.  If the MRI does not show any concerning abnormalities, further follow-up is unlikely to be necessary.    2.  Chronic reflux symptoms.  The patient is having some breakthrough symptoms as well as chest pain.  I think it is reasonable to perform an endoscopy to assess whether she has overt evidence of esophagitis (and to screen for Friend's).  The patient would like to proceed with this.    She had numerous questions that were addressed.    Follow Up:  If her testing is unremarkable, she does not need to follow-up here unless she has new GI or liver concerns.    About 30 minutes spent  today with patient, reviewing results, and coordinating care.        Aditya Agrawal MD  Professor of Medicine  [facility]  Division of Gastroenterology, Hepatology, and Nutrition

## 2022-05-03 NOTE — PATIENT INSTRUCTIONS
It was a pleasure taking care of you today. I've included a brief summary of our discussion and care plan from today's visit below.  Please review this information with your primary care provider.  _______________________________________________________________________    My recommendations are summarized as follows:    MRI as discussed.  Endoscopy as discussed.  If these do not show concerning findings, we may not need to see you back in clinic. However, you are welcome to return if you have GI or Liver concerns.        If you need any follow-up appointments, please use the following phone numbers below.    To schedule or reschedule a follow-up GI appointment, call [phone] option 1    To schedule your endoscopy procedure, call [phone] option 2    To schedule imaging, please call [phone]     To schedule your lab appointment at [address] floor lab call [phone]. Call your [place] lab directly if it is not [facility]. If you use a non-[place] lab, please let us know where to fax your lab order (call Isa at [phone]).      _______________________________________________________________________    Please be in touch if there are any further questions that arise following today's visit.  There are multiple ways to contact your gastroenterology care team.      During business hours, you may reach your gastroenterology RN Care Coordinator, Isa Hernadez, at [phone].      You can always send a secure message through Moku. Moku messages are answered by your nurse or doctor typically within 24 hours. Please allow extra time on weekends and holidays.     What is Moku?  Moku is a secure way for you to access all of your healthcare records from the [facility].  It is a web based computer program, so you can sign on to it from any location.  It also allows you to send secure messages to your care team.  I recommend signing up for Moku access if  you have not already done so and are comfortable with using a computer.     For urgent/emergent questions after business hours, you may reach the on-call GI Fellow by contacting the [facility]  at [phone].     How will I get the results of any tests ordered?    You will receive all of your results.  If you have signed up for [employer], any tests ordered at your visit will be available to you after your physician reviews them.  Typically this takes 1-2 weeks.  If there are urgent results that require a change in your care plan, your physician or nurse will call you to discuss the next steps.      Thank you for choosing [facility]!       Sincerely,    Aditya Agrawal MD  Professor of Medicine  [facility]  Division of Gastroenterology, Hepatology, and Nutrition

## 2022-05-05 DIAGNOSIS — Z11.59 ENCOUNTER FOR SCREENING FOR OTHER VIRAL DISEASES: Primary | ICD-10-CM

## 2022-05-09 NOTE — TELEPHONE ENCOUNTER
Patient is scheduled for imaging and endoscopy with Dr. Agrawal next week. Will follow-up with patient pending provider recommendations.

## 2022-05-16 ENCOUNTER — LAB (OUTPATIENT)
Dept: LAB | Facility: CLINIC | Age: 67
End: 2022-05-16
Payer: MEDICARE

## 2022-05-16 DIAGNOSIS — Z11.59 ENCOUNTER FOR SCREENING FOR OTHER VIRAL DISEASES: ICD-10-CM

## 2022-05-16 PROCEDURE — U0003 INFECTIOUS AGENT DETECTION BY NUCLEIC ACID (DNA OR RNA); SEVERE ACUTE RESPIRATORY SYNDROME CORONAVIRUS 2 (SARS-COV-2) (CORONAVIRUS DISEASE [COVID-19]), AMPLIFIED PROBE TECHNIQUE, MAKING USE OF HIGH THROUGHPUT TECHNOLOGIES AS DESCRIBED BY CMS-2020-01-R: HCPCS

## 2022-05-16 PROCEDURE — U0005 INFEC AGEN DETEC AMPLI PROBE: HCPCS

## 2022-05-17 LAB — SARS-COV-2 RNA RESP QL NAA+PROBE: NEGATIVE

## 2022-05-18 ENCOUNTER — HOSPITAL ENCOUNTER (OUTPATIENT)
Facility: CLINIC | Age: 67
Discharge: HOME | End: 2022-05-18
Attending: INTERNAL MEDICINE | Admitting: INTERNAL MEDICINE
Payer: MEDICARE

## 2022-05-18 VITALS
BODY MASS INDEX: 27.16 KG/M2 | HEIGHT: 65 IN | DIASTOLIC BLOOD PRESSURE: 70 MMHG | HEART RATE: 51 BPM | WEIGHT: 163 LBS | SYSTOLIC BLOOD PRESSURE: 114 MMHG | OXYGEN SATURATION: 95 % | RESPIRATION RATE: 12 BRPM

## 2022-05-18 LAB — UPPER GI ENDOSCOPY: NORMAL

## 2022-05-18 PROCEDURE — 250N000011 HC RX IP 250 OP 636: Performed by: INTERNAL MEDICINE

## 2022-05-18 PROCEDURE — 88305 TISSUE EXAM BY PATHOLOGIST: CPT | Mod: TC | Performed by: INTERNAL MEDICINE

## 2022-05-18 PROCEDURE — 250N000009 HC RX 250: Performed by: INTERNAL MEDICINE

## 2022-05-18 PROCEDURE — 43239 EGD BIOPSY SINGLE/MULTIPLE: CPT | Performed by: INTERNAL MEDICINE

## 2022-05-18 PROCEDURE — 999N000099 HC STATISTIC MODERATE SEDATION < 10 MIN: Performed by: INTERNAL MEDICINE

## 2022-05-18 RX ORDER — FENTANYL CITRATE 50 UG/ML
INJECTION, SOLUTION INTRAMUSCULAR; INTRAVENOUS PRN
Status: COMPLETED | OUTPATIENT
Start: 2022-05-18 | End: 2022-05-18

## 2022-05-18 RX ADMIN — MIDAZOLAM 3 MG: 1 INJECTION INTRAMUSCULAR; INTRAVENOUS at 15:23

## 2022-05-18 RX ADMIN — FENTANYL CITRATE 150 MCG: 50 INJECTION, SOLUTION INTRAMUSCULAR; INTRAVENOUS at 15:22

## 2022-05-18 RX ADMIN — TOPICAL ANESTHETIC 1 SPRAY: 200 SPRAY DENTAL; PERIODONTAL at 15:21

## 2022-05-19 ENCOUNTER — HOSPITAL ENCOUNTER (OUTPATIENT)
Dept: MRI IMAGING | Facility: CLINIC | Age: 67
Discharge: HOME | End: 2022-05-19
Attending: INTERNAL MEDICINE | Admitting: INTERNAL MEDICINE
Payer: COMMERCIAL

## 2022-05-19 DIAGNOSIS — R93.2 ABNORMAL CT SCAN, LIVER: ICD-10-CM

## 2022-05-19 PROCEDURE — 74183 MRI ABD W/O CNTR FLWD CNTR: CPT | Mod: 26 | Performed by: RADIOLOGY

## 2022-05-19 PROCEDURE — G1004 CDSM NDSC: HCPCS | Performed by: RADIOLOGY

## 2022-05-19 PROCEDURE — A9585 GADOBUTROL INJECTION: HCPCS | Performed by: INTERNAL MEDICINE

## 2022-05-19 PROCEDURE — 255N000002 HC RX 255 OP 636: Performed by: INTERNAL MEDICINE

## 2022-05-19 PROCEDURE — 74183 MRI ABD W/O CNTR FLWD CNTR: CPT | Mod: ME

## 2022-05-19 RX ORDER — GADOBUTROL 604.72 MG/ML
7 INJECTION INTRAVENOUS ONCE
Status: COMPLETED | OUTPATIENT
Start: 2022-05-19 | End: 2022-05-19

## 2022-05-19 RX ADMIN — GADOBUTROL 7 ML: 604.72 INJECTION INTRAVENOUS at 07:40

## 2022-05-20 LAB
PATH REPORT.COMMENTS IMP SPEC: NORMAL
PATH REPORT.COMMENTS IMP SPEC: NORMAL
PATH REPORT.FINAL DX SPEC: NORMAL
PATH REPORT.GROSS SPEC: NORMAL
PATH REPORT.MICROSCOPIC SPEC OTHER STN: NORMAL
PATH REPORT.RELEVANT HX SPEC: NORMAL
PHOTO IMAGE: NORMAL

## 2022-05-20 PROCEDURE — 88305 TISSUE EXAM BY PATHOLOGIST: CPT | Mod: 26 | Performed by: PATHOLOGY

## 2022-06-24 DIAGNOSIS — I10 ESSENTIAL HYPERTENSION: ICD-10-CM

## 2022-06-28 RX ORDER — HYDROCHLOROTHIAZIDE 12.5 MG/1
CAPSULE ORAL
Qty: 90 CAPSULE | Refills: 1 | Status: SHIPPED | OUTPATIENT
Start: 2022-06-28 | End: 2022-12-08

## 2022-08-18 ENCOUNTER — MYC MEDICAL ADVICE (OUTPATIENT)
Dept: FAMILY MEDICINE | Facility: CLINIC | Age: 67
End: 2022-08-18

## 2022-08-23 ENCOUNTER — ALLIED HEALTH/NURSE VISIT (OUTPATIENT)
Dept: FAMILY MEDICINE | Facility: CLINIC | Age: 67
End: 2022-08-23
Payer: MEDICARE

## 2022-08-23 DIAGNOSIS — Z23 NEED FOR POLIO VACCINATION: Primary | ICD-10-CM

## 2022-08-23 PROCEDURE — 90713 POLIOVIRUS IPV SC/IM: CPT

## 2022-08-23 PROCEDURE — 99207 PR NO CHARGE NURSE ONLY: CPT

## 2022-08-23 PROCEDURE — 90471 IMMUNIZATION ADMIN: CPT

## 2022-08-23 NOTE — PROGRESS NOTES
Prior to immunization administration, verified patients identity using patient s name and date of birth. Please see Immunization Activity for additional information.     Screening Questionnaire for Adult Immunization    Are you sick today?   No   Do you have allergies to medications, food, a vaccine component or latex?   No   Have you ever had a serious reaction after receiving a vaccination?   No   Do you have a long-term health problem with heart, lung, kidney, or metabolic disease (e.g., diabetes), asthma, a blood disorder, no spleen, complement component deficiency, a cochlear implant, or a spinal fluid leak?  Are you on long-term aspirin therapy?   No   Do you have cancer, leukemia, HIV/AIDS, or any other immune system problem?   No   Do you have a parent, brother, or sister with an immune system problem?   No   In the past 3 months, have you taken medications that affect  your immune system, such as prednisone, other steroids, or anticancer drugs; drugs for the treatment of rheumatoid arthritis, Crohn s disease, or psoriasis; or have you had radiation treatments?   No   Have you had a seizure, or a brain or other nervous system problem?   No   During the past year, have you received a transfusion of blood or blood    products, or been given immune (gamma) globulin or antiviral drug?   No   For women: Are you pregnant or is there a chance you could become       pregnant during the next month?   No   Have you received any vaccinations in the past 4 weeks?   No     Immunization questionnaire answers were all negative.        Per orders of Dr. Polanco, injection of Polio given by Brayan Stokes MA. Patient instructed to remain in clinic for 15 minutes afterwards, and to report any adverse reaction to me immediately.       Screening performed by Brayan Stokes MA on 8/23/2022 at 11:19 AM.

## 2022-08-24 DIAGNOSIS — E28.2 PCOS (POLYCYSTIC OVARIAN SYNDROME): ICD-10-CM

## 2022-08-24 NOTE — TELEPHONE ENCOUNTER
Talked with GAEL Arellano.  We have IPV in the clinic.  Pt can make a nurse only appt.   would need to make a nurse only appt also.

## 2022-08-25 NOTE — TELEPHONE ENCOUNTER
Routing refill request to provider for review/approval because:  A break in medication    Ashley Gay RN on 8/25/2022 at 8:56 AM

## 2022-08-26 RX ORDER — METFORMIN HCL 500 MG
TABLET, EXTENDED RELEASE 24 HR ORAL
Qty: 90 TABLET | Refills: 1 | Status: SHIPPED | OUTPATIENT
Start: 2022-08-26 | End: 2022-12-08

## 2022-10-09 ENCOUNTER — HEALTH MAINTENANCE LETTER (OUTPATIENT)
Age: 67
End: 2022-10-09

## 2022-10-26 ENCOUNTER — TRANSFERRED RECORDS (OUTPATIENT)
Dept: HEALTH INFORMATION MANAGEMENT | Facility: CLINIC | Age: 67
End: 2022-10-26

## 2022-11-07 ENCOUNTER — HOSPITAL ENCOUNTER (OUTPATIENT)
Dept: MAMMOGRAPHY | Facility: CLINIC | Age: 67
Discharge: HOME | End: 2022-11-07
Attending: FAMILY MEDICINE | Admitting: FAMILY MEDICINE
Payer: MEDICARE

## 2022-11-07 DIAGNOSIS — Z12.31 VISIT FOR SCREENING MAMMOGRAM: ICD-10-CM

## 2022-11-07 PROCEDURE — 77067 SCR MAMMO BI INCL CAD: CPT

## 2022-11-17 ENCOUNTER — OFFICE VISIT (OUTPATIENT)
Dept: FAMILY MEDICINE | Facility: CLINIC | Age: 67
End: 2022-11-17
Payer: MEDICARE

## 2022-11-17 VITALS
RESPIRATION RATE: 18 BRPM | DIASTOLIC BLOOD PRESSURE: 74 MMHG | HEART RATE: 72 BPM | TEMPERATURE: 97.9 F | WEIGHT: 162.3 LBS | OXYGEN SATURATION: 99 % | HEIGHT: 65 IN | BODY MASS INDEX: 27.04 KG/M2 | SYSTOLIC BLOOD PRESSURE: 116 MMHG

## 2022-11-17 DIAGNOSIS — I10 ESSENTIAL HYPERTENSION: ICD-10-CM

## 2022-11-17 DIAGNOSIS — M75.102 ROTATOR CUFF SYNDROME, LEFT: ICD-10-CM

## 2022-11-17 DIAGNOSIS — E78.5 HYPERLIPIDEMIA LDL GOAL <130: ICD-10-CM

## 2022-11-17 DIAGNOSIS — Z86.39 HISTORY OF ELEVATED GLUCOSE: ICD-10-CM

## 2022-11-17 DIAGNOSIS — Z01.818 PREOP GENERAL PHYSICAL EXAM: Primary | ICD-10-CM

## 2022-11-17 DIAGNOSIS — E28.2 PCOS (POLYCYSTIC OVARIAN SYNDROME): ICD-10-CM

## 2022-11-17 LAB
ANION GAP SERPL CALCULATED.3IONS-SCNC: 11 MMOL/L (ref 7–15)
BUN SERPL-MCNC: 22.7 MG/DL (ref 8–23)
CALCIUM SERPL-MCNC: 9.4 MG/DL (ref 8.8–10.2)
CHLORIDE SERPL-SCNC: 99 MMOL/L (ref 98–107)
CREAT SERPL-MCNC: 0.65 MG/DL (ref 0.51–1.17)
DEPRECATED HCO3 PLAS-SCNC: 27 MMOL/L (ref 22–29)
GFR SERPL CREATININE-BSD FRML MDRD: >90 ML/MIN/1.73M2
GLUCOSE SERPL-MCNC: 86 MG/DL (ref 70–99)
HBA1C MFR BLD: 5.3 % (ref 0–5.6)
HGB BLD-MCNC: 14.2 G/DL (ref 11.7–17.7)
POTASSIUM SERPL-SCNC: 4.4 MMOL/L (ref 3.4–5.3)
SODIUM SERPL-SCNC: 137 MMOL/L (ref 136–145)

## 2022-11-17 PROCEDURE — 36415 COLL VENOUS BLD VENIPUNCTURE: CPT | Performed by: INTERNAL MEDICINE

## 2022-11-17 PROCEDURE — 99214 OFFICE O/P EST MOD 30 MIN: CPT | Performed by: INTERNAL MEDICINE

## 2022-11-17 PROCEDURE — 80048 BASIC METABOLIC PNL TOTAL CA: CPT | Performed by: INTERNAL MEDICINE

## 2022-11-17 PROCEDURE — 83036 HEMOGLOBIN GLYCOSYLATED A1C: CPT | Performed by: INTERNAL MEDICINE

## 2022-11-17 PROCEDURE — 85018 HEMOGLOBIN: CPT | Performed by: INTERNAL MEDICINE

## 2022-11-17 RX ORDER — TACROLIMUS 1 MG/G
OINTMENT TOPICAL
COMMUNITY
Start: 2022-02-22 | End: 2024-05-10

## 2022-11-17 ASSESSMENT — PAIN SCALES - GENERAL: PAINLEVEL: SEVERE PAIN (6)

## 2022-11-17 NOTE — PATIENT INSTRUCTIONS
Preparing for Your Surgery  Getting started  A nurse will call you to review your health history and instructions. They will give you an arrival time based on your scheduled surgery time. Please be ready to share:  Your doctor s clinic name and phone number  Your medical, surgical, and anesthesia history  A list of allergies and sensitivities  A list of medicines, including herbal treatments and over-the-counter drugs  Whether the patient has a legal guardian (ask how to send us the papers in advance)  Please tell us if you re pregnant--or if there s any chance you might be pregnant. Some surgeries may injure a fetus (unborn baby), so they require a pregnancy test. Surgeries that are safe for a fetus don t always need a test, and you can choose whether to have one.   If you have a child who s having surgery, please ask for a copy of Preparing for Your Child s Surgery.    Preparing for surgery  Within 10 to 30 days of surgery: Have a pre-op exam (sometimes called an H&P, or History and Physical). This can be done at a clinic or pre-operative center.  If you re having a , you may not need this exam. Talk to your care team.  At your pre-op exam, talk to your care team about all medicines you take. If you need to stop any medicines before surgery, ask when to start taking them again.  We do this for your safety. Many medicines can make you bleed too much during surgery. Some change how well surgery (anesthesia) drugs work.  Call your insurance company to let them know you re having surgery. (If you don t have insurance, call [phone].)  Call your clinic if there s any change in your health. This includes signs of a cold or flu (sore throat, runny nose, cough, rash, fever). It also includes a scrape or scratch near the surgery site.  If you have questions on the day of surgery, call your hospital or surgery center.  COVID testing  You may need to be tested for COVID-19 before having surgery. If so, we will  give you instructions (or click here).  Eating and drinking guidelines  For your safety: Unless your surgeon tells you otherwise, follow the guidelines below.  Eat and drink as usual until 8 hours before you arrive for surgery. After that, no food or milk.  Drink clear liquids until 2 hours before you arrive. These are liquids you can see through, like water, Gatorade, and Propel Water. They also include plain black coffee and tea (no cream or milk), candy, and breath mints. You can spit out gum when you arrive.  If you drink alcohol: Stop drinking it the night before surgery.  If your care team tells you to take medicine on the morning of surgery, it s okay to take it with a sip of water.  Preventing infection  Shower or bathe the night before and morning of your surgery. Follow the instructions your clinic gave you. (If no instructions, use regular soap.)  Don t shave or clip hair near your surgery site. We ll remove the hair if needed.  Don t smoke or vape the morning of surgery. You may chew nicotine gum up to 2 hours before surgery. A nicotine patch is okay.  Note: Some surgeries require you to completely quit smoking and nicotine. Check with your surgeon.  Your care team will make every effort to keep you safe from infection. We will:  Clean our hands often with soap and water (or an alcohol-based hand rub).  Clean the skin at your surgery site with a special soap that kills germs.  Give you a special gown to keep you warm. (Cold raises the risk of infection.)  Wear special hair covers, masks, gowns and gloves during surgery.  Give antibiotic medicine, if prescribed. Not all surgeries need antibiotics.  What to bring on the day of surgery  Photo ID and insurance card  Copy of your health care directive, if you have one  Glasses and hearing aids (bring cases)  You can t wear contacts during surgery  Inhaler and eye drops, if you use them (tell us about these when you arrive)  CPAP machine or breathing device,  if you use them  A few personal items, if spending the night  If you have . . .  A pacemaker, ICD (cardiac defibrillator) or other implant: Bring the ID card.  An implanted stimulator: Bring the remote control.  A legal guardian: Bring a copy of the certified (court-stamped) guardianship papers.  Please remove any jewelry, including body piercings. Leave jewelry and other valuables at home.  If you re going home the day of surgery  You must have a responsible adult drive you home. They should stay with you overnight as well.  If you don t have someone to stay with you, and you aren t safe to go home alone, we may keep you overnight. Insurance often won t pay for this.  After surgery  If it s hard to control your pain or you need more pain medicine, please call your surgeon s office.  Questions?   If you have any questions for your care team, list them here:   ____________________________________________________________________________________________________________________________________________________________________________________________________________________________________________________________________  For informational purposes only. Not to replace the advice of your health care provider. Copyright   2003, 2019 [employer]. All rights reserved. Clinically reviewed by Elisabeth Lott MD. [employer] 528001 - REV 10/22.        Meds reviewed prior to surgery:  --Approval given to proceed with proposed procedure, without further diagnostic evaluation  --Pt advised to avoid Vitamin E, Fish Oil and NSAIDS, (Motrin, Ibuprofen, Aleve or Naprosyn);  If needed, Tylenol or Acetaminophen are fine to use.  --meds reviewed; may hold meds on AM of surgery.  No hx of diabetes; pt on Metformin for insulin resistance ( with PCOS).  --Pain medications, time off from work and FMLA following surgery deferred to surgeon.

## 2022-11-17 NOTE — PROGRESS NOTES
[facility]  [address] [address] [address]  Phone: [phone]  Primary Provider: Sadaf Polanco  Pre-op Performing Provider: Jocelyne Erickson MD      PREOPERATIVE EVALUATION:  Today's date: 11/17/2022    Brittany Chan is a 67 year old adult who presents for a preoperative evaluation.    Surgical Information:  Surgery/Procedure: Rotator cuff surgery, left shoulder  Surgery Location: [facility]  Surgeon: Solo Marshall MD  Surgery Date: 11/22/22  Time of Surgery: 12:30 P.M.  Where patient plans to recover: At home with family  Fax number for surgical facility: [phone]    Type of Anesthesia Anticipated: Nerve Block, unsure    Assessment & Plan     The proposed surgical procedure is considered INTERMEDIATE risk.    (Z01.818) Preop general physical exam  (primary encounter diagnosis)  Comment: left shoulder Rotator Cuff repair;  Proceed with surgery. Labs reviewed and up to date.  Plan: Basic metabolic panel  (Ca, Cl, CO2, Creat,         Gluc, K, Na, BUN), Hemoglobin          (M75.102) Rotator cuff syndrome, left  Comment: Left Rotator Cuff Syndrome  Plan: proceed with surgery    (E28.2) PCOS (polycystic ovarian syndrome)  Comment: Metformin for PCOS, no hx of diabetes. meds well tolerated;   Plan: Hemoglobin A1c          (I10) Essential hypertension  Comment: BLOOD PRESSURE well controlled  Plan: Basic metabolic panel  (Ca, Cl, CO2, Creat,         Gluc, K, Na, BUN), Hemoglobin          (E78.5) Hyperlipidemia LDL goal <130  Comment: Simvastatin well tolerated.   Lab Results   Component Value Date    LDL 93 11/08/2021     07/28/2021    LDL 86 10/07/2020     08/15/2019        Plan: continue current meds    (Z86.39) History of elevated glucose  Comment: Insulin resistance related - taking Metformin daily; NO HISTORY OF DIABETES.  Plan: Hemoglobin A1c               Risks and Recommendations:  The patient has the following additional risks and recommendations  for perioperative complications:       - No identified additional risk factors other than previously addressed      RECOMMENDATION:  APPROVAL GIVEN to proceed with proposed procedure, without further diagnostic evaluation.      Meds reviewed prior to surgery:  --Approval given to proceed with proposed procedure, without further diagnostic evaluation  --Pt advised to avoid Vitamin E, Fish Oil and NSAIDS, (Motrin, Ibuprofen, Aleve or Naprosyn);  If needed, Tylenol or Acetaminophen are fine to use.  --meds reviewed; may hold meds on AM of surgery.  No hx of diabetes; pt on Metformin for insulin resistance ( with PCOS).  --Pain medications, time off from work and FMLA following surgery deferred to surgeon.        30 minutes spent on the date of the encounter doing chart review, history and exam, documentation and further activities per the note    Jocelyne Erickson MD  Internal Medicine  electronically signed             Subjective     HPI related to upcoming procedure:   Brittany Chan is a 67 year old adult who presents with left shoulder pain and now in need of LEFT  rotator cuff surgery. She has previously had a right rotator cuff surgery and has done well.     Preop Questions 11/17/2022   1. Have you ever had a heart attack or stroke? No   2. Have you ever had surgery on your heart or blood vessels, such as a stent placement, a coronary artery bypass, or surgery on an artery in your head, neck, heart, or legs? No   3. Do you have chest pain with activity? No   4. Do you have a history of  heart failure? No   5. Do you currently have a cold, bronchitis or symptoms of other infection? No   6. Do you have a cough, shortness of breath, or wheezing? No   7. Do you or anyone in your family have previous history of blood clots? No   8. Do you or does anyone in your family have a serious bleeding problem such as prolonged bleeding following surgeries or cuts? No   9. Have you ever had problems with anemia or been told to take  iron pills? No   10. Have you had any abnormal blood loss such as black, tarry or bloody stools, or abnormal vaginal bleeding? No   11. Have you ever had a blood transfusion? No   12. Are you willing to have a blood transfusion if it is medically needed before, during, or after your surgery? Yes   13. Have you or any of your relatives ever had problems with anesthesia? No   14. Do you have sleep apnea, excessive snoring or daytime drowsiness? No   15. Do you have any artifical heart valves or other implanted medical devices like a pacemaker, defibrillator, or continuous glucose monitor? No   16. Do you have artificial joints? No   17. Are you allergic to latex? No       Health Care Directive:  Patient has a Health Care Directive on file      Preoperative Review of :   reviewed - controlled substances prescribed by other outside provider(s). she received #6 in 2/2022 related to hand surgeon.        Review of Systems  CONSTITUTIONAL: NEGATIVE for fever, chills, change in weight  INTEGUMENTARY/SKIN: NEGATIVE for worrisome rashes, moles or lesions  EYES: NEGATIVE for vision changes or irritation  ENT/MOUTH: NEGATIVE for ear, mouth and throat problems  RESP: NEGATIVE for significant cough or SOB  CV: hypertension; no chest pain,   GI: NEGATIVE for nausea, abdominal pain, heartburn, or change in bowel habits  : NEGATIVE for frequency, dysuria, or hematuria  MUSCULOSKELETAL:left shoulder pain; Rotators Cuff issue  NEURO: NEGATIVE for weakness, dizziness or paresthesias  ENDOCRINE: Hx of PCOS- treated with Metformin; no hx of diabetes.  HEME: NEGATIVE for bleeding problems  PSYCHIATRIC: Anxiety, meds reviewed.    Patient Active Problem List    Diagnosis Date Noted     Osteoarthritis of finger, unspecified laterality 07/28/2021     Priority: Medium     Eczema, unspecified type 07/28/2021     Priority: Medium     Essential hypertension 07/28/2021     Priority: Medium     Post-operative state 12/14/2020     Priority:  Medium     Anxiety 05/31/2016     Priority: Medium     Stress incontinence 10/12/2012     Priority: Medium     Overweight (BMI 25.0-29.9) 10/12/2012     Priority: Medium     Family history of diabetes mellitus 09/29/2011     Priority: Medium     Father, borderline, lost weight and went away       Atypical squamous cells cannot exclude high grade squamous intraepithelial lesion on cytologic smear of cervix (ASC-H) 09/29/2011     Priority: Medium     09/29/11 ASC-H: referred for colposcopy by 01/2012  10/28/11 Colposcopy done with [facility], abstracted in chart. Per patient, results were unremarkable and will be having follow up pap with them by April 2012 05/16/12 Pap= Normal (abstracted into chart)  10/12/12 Dx pap= Normal. Plan r/p 1 year- due 10/2013  10/29/13 Dx pap= ASCUS, Negative HPV. Routine screening.  01/19/15 Pap= ASCUS, Neg HPV. Co-test 1 yr per Dr. Polanco  03/18/16 Pap= NIL, Neg HPV. 3 yr co-test  8/15/19 Pap: NIL/neg HR HPV. Pap in 3 years       Hyperlipidemia with target LDL less than 130 10/31/2010     Priority: Medium     Hemorrhoid 06/04/2009     Priority: Medium     Mild internal hemorrhoids       PCOS (polycystic ovarian syndrome) 04/28/2009     Priority: Medium     H/o PCOS & irregular periods in past.        Esophageal reflux 01/19/2007     Priority: Medium     Loss of height 08/04/2003     Priority: Medium     Family history of other endocrine and metabolic diseases 08/04/2003     Priority: Medium     Problem list name updated by automated process. Provider to review and confirm       Flatulence, eructation, and gas pain 08/04/2003     Priority: Medium     Migraine 08/04/2003     Priority: Medium     Problem list name updated by automated process. Provider to review        Past Medical History:   Diagnosis Date     Abnormal Pap smear, can't excl hi gd sq intraepithelial lesion (ASC-H) 09/29/2011     Arthritis      Cancer (H)      Complication of anesthesia     Gets tachycardic from epi  in Dental numbing injections     GERD (gastroesophageal reflux disease)      High cholesterol      High cholesterol      Incontinence of urine in female      Pelvic floor relaxation      Past Surgical History:   Procedure Laterality Date     BREAST LUMPECTOMY, RT/LT  1978    lt fibroadenoma     COLONOSCOPY       COLONOSCOPY Left 9/26/2019    Procedure: COLONOSCOPY;  Surgeon: Renita Pablo MD;  Location:  GI     DAVINCI PELVIC PROCEDURE N/A 12/14/2020    Procedure: Robotic assisted sacrocolpopexy, supracervical hysterectomy with bilateral salpingo-oophorectomy, abdominal enterocele repair, midurethral sling, cystoscopy, and  posterior repair;  Surgeon: Gail Yang MD;  Location:  OR     ESOPHAGOSCOPY, GASTROSCOPY, DUODENOSCOPY (EGD), COMBINED N/A 8/5/2016    Procedure: COMBINED ESOPHAGOSCOPY, GASTROSCOPY, DUODENOSCOPY (EGD);  Surgeon: Feroz Ramirez MD;  Location:  GI     ESOPHAGOSCOPY, GASTROSCOPY, DUODENOSCOPY (EGD), COMBINED N/A 5/18/2022    Procedure: ESOPHAGOGASTRODUODENOSCOPY, WITH BIOPSY;  Surgeon: Aditya Agrawal MD;  Location:  GI     ORTHOPEDIC SURGERY Right 2014    rotator cuff repair     TONSILLECTOMY & ADENOIDECTOMY       Current Outpatient Medications   Medication Sig Dispense Refill     calcium carbonate (TUMS) 500 MG chewable tablet Take 1 chew tab by mouth 2 times daily       Cholecalciferol (VITAMIN D) 1000 UNIT capsule Take 2 capsules by mouth daily.        COLLAGEN PO        conjugated estrogens (PREMARIN) 0.625 MG/GM vaginal cream Place 0.5 g vaginally twice a week 30 g 11     doxycycline monohydrate (ADOXA) 50 MG tablet TAKE 1 TABLET BY MOUTH TWICE DAILY WITH MEALS UNTIL CLEAR       estradiol (VAGIFEM) 10 MCG TABS vaginal tablet        famotidine (PEPCID) 20 MG tablet Take 20 mg by mouth 2 times daily       fish oil-omega-3 fatty acids 1000 MG capsule Take 1 g by mouth 2 times daily       Flaxseed (Linseed) (FLAX SEED OIL) 1000 MG capsule Take 1 capsule by  mouth daily       hydrochlorothiazide (MICROZIDE) 12.5 MG capsule TAKE 1 CAPSULE(12.5 MG) BY MOUTH DAILY 90 capsule 1     ibuprofen (ADVIL/MOTRIN) 600 MG tablet Take 1 tablet (600 mg) by mouth every 6 hours as needed for moderate pain 30 tablet 0     Loratadine (CLARITIN PO) Take 10 mg by mouth daily as needed        melatonin 3 MG tablet Take 3 mg by mouth nightly as needed for sleep       metFORMIN (GLUCOPHAGE XR) 500 MG 24 hr tablet TAKE 1 TABLET(500 MG) BY MOUTH DAILY WITH DINNER 90 tablet 1     mometasone (ELOCON) 0.1 % external cream        multivitamin w/minerals (THERA-VIT-M) tablet Take 1 tablet by mouth daily       mupirocin (BACTROBAN) 2 % external ointment Apply topically 3 times daily 15 g 1     omeprazole (PRILOSEC) 20 MG DR capsule Take 1 capsule (20 mg) by mouth 2 times daily 180 capsule 3     Probiotic Product (PROBIOTIC PO) Take by mouth daily        simvastatin (ZOCOR) 20 MG tablet Take 1 tablet (20 mg) by mouth At Bedtime at bedtime. 90 tablet 3     tacrolimus (PROTOPIC) 0.1 % external ointment APPLY THIN LAYER TOPICALLY TO THE AFFECTED AREA TWICE DAILY UNTIL GONE       triamcinolone (KENALOG) 0.1 % external ointment Apply topically 2 times daily 30 g 3       Allergies   Allergen Reactions     Lisinopril Cough     Thimerosal      preservative in contact solution -        Social History     Tobacco Use     Smoking status: Former     Packs/day: 0.00     Years: 0.00     Pack years: 0.00     Types: Cigarettes     Quit date: 1980     Years since quittin.9     Smokeless tobacco: Never   Substance Use Topics     Alcohol use: Yes     Comment: 5 a week     Family History   Problem Relation Age of Onset     Hypertension Father      Prostate Cancer Father      Eye Disorder Father         macular degeneration     Diabetes Father      Allergies Mother         nuts     Arthritis Mother      Osteoporosis Mother      Hyperlipidemia Mother      Obesity Sister      Gynecology Sister         polycystic  "ovaries     Obesity Sister      Diabetes Nephew         type 2     Anxiety Disorder Daughter      Diabetes Nephew      History   Drug Use No         Objective     /74 (BP Location: Right arm, Patient Position: Sitting, Cuff Size: Adult Regular)   Pulse 72   Temp 97.9  F (36.6  C) (Oral)   Resp 18   Ht 1.643 m (5' 4.7\")   Wt 73.6 kg (162 lb 4.8 oz)   LMP 10/05/2012   SpO2 99%   BMI 27.26 kg/m      Physical Exam    GENERAL APPEARANCE: healthy, alert and no distress     HENT: ear canals and TM's normal and nose and mouth without ulcers or lesions     NECK: no adenopathy, no asymmetry, masses, or scars and thyroid normal to palpation     RESP: lungs clear to auscultation - no rales, rhonchi or wheezes     CV: regular rates and rhythm, normal S1 S2, no S3 or S4 and no murmur, click or rub     ABDOMEN:  soft, nontender, no HSM or masses and bowel sounds normal     MS: left shoulder with pain at end range of motion. Strength preserved.     NEURO: Normal strength and tone, sensory exam grossly normal, mentation intact and speech normal     PSYCH: mentation appears normal. and affect normal/bright    Recent Labs   Lab Test 01/20/22  0930 11/08/21  1302 11/08/21  1301 07/28/21  0739   HGB 14.9  --   --  13.5     --   --  258     --  137 138   POTASSIUM 4.2  --  3.9 4.4   CR 0.62  --  0.70 0.72   A1C  --  5.4  --  5.5        Diagnostics:  Recent Results (from the past 168 hour(s))   Hemoglobin A1c    Collection Time: 11/17/22 12:12 PM   Result Value Ref Range    Hemoglobin A1C 5.3 0.0 - 5.6 %   Basic metabolic panel  (Ca, Cl, CO2, Creat, Gluc, K, Na, BUN)    Collection Time: 11/17/22 12:12 PM   Result Value Ref Range    Sodium 137 136 - 145 mmol/L    Potassium 4.4 3.4 - 5.3 mmol/L    Chloride 99 98 - 107 mmol/L    Carbon Dioxide (CO2) 27 22 - 29 mmol/L    Anion Gap 11 7 - 15 mmol/L    Urea Nitrogen 22.7 8.0 - 23.0 mg/dL    Creatinine 0.65 0.51 - 1.17 mg/dL    Calcium 9.4 8.8 - 10.2 mg/dL    Glucose " 86 70 - 99 mg/dL    GFR Estimate >90 >60 mL/min/1.73m2   Hemoglobin    Collection Time: 11/17/22 12:12 PM   Result Value Ref Range    Hemoglobin 14.2 11.7 - 17.7 g/dL          Revised Cardiac Risk Index (RCRI):  The patient has the following serious cardiovascular risks for perioperative complications:   - No serious cardiac risks = 0 points     RCRI Interpretation: 0 points: Class I (very low risk - 0.4% complication rate)           Signed Electronically by: Jocelyne Erickson MD  Internal Medicine  [facility]  Copy of this evaluation report is provided to requesting physician.

## 2022-11-22 DIAGNOSIS — K21.9 GASTROESOPHAGEAL REFLUX DISEASE WITHOUT ESOPHAGITIS: ICD-10-CM

## 2022-11-22 DIAGNOSIS — E78.5 HYPERLIPIDEMIA WITH TARGET LDL LESS THAN 130: ICD-10-CM

## 2022-11-22 RX ORDER — SIMVASTATIN 20 MG
TABLET ORAL
Qty: 90 TABLET | Refills: 0 | Status: SHIPPED | OUTPATIENT
Start: 2022-11-22 | End: 2022-12-08

## 2022-11-22 NOTE — TELEPHONE ENCOUNTER
Patient should have refills on file for omeprazole. Routing request to provider for review/approval because:  Labs not current:  LDL    Visit is up to date. RN will issue one time 90 day isidro refill. Please advise on labs.   Ty GONZALEZ RN 11/22/2022 at 5:23 PM

## 2022-11-29 DIAGNOSIS — N81.89 PELVIC FLOOR WEAKNESS: ICD-10-CM

## 2022-11-29 RX ORDER — ESTRADIOL 10 UG/1
INSERT VAGINAL
Qty: 25 TABLET | Refills: 0 | Status: SHIPPED | OUTPATIENT
Start: 2022-11-29 | End: 2024-05-10

## 2022-11-29 NOTE — TELEPHONE ENCOUNTER
Prescription approved per [facility] Refill Protocol.    Ashley Gay RN on 11/29/2022 at 5:19 PM

## 2022-12-02 ENCOUNTER — MYC MEDICAL ADVICE (OUTPATIENT)
Dept: FAMILY MEDICINE | Facility: CLINIC | Age: 67
End: 2022-12-02

## 2022-12-02 ENCOUNTER — TELEPHONE (OUTPATIENT)
Dept: FAMILY MEDICINE | Facility: CLINIC | Age: 67
End: 2022-12-02

## 2022-12-02 NOTE — TELEPHONE ENCOUNTER
[facility]. Placed in PCP basket for review and signature. Fax [phone]    Milli Clark

## 2022-12-05 ENCOUNTER — TRANSFERRED RECORDS (OUTPATIENT)
Dept: FAMILY MEDICINE | Facility: CLINIC | Age: 67
End: 2022-12-05

## 2022-12-05 ENCOUNTER — TELEPHONE (OUTPATIENT)
Dept: FAMILY MEDICINE | Facility: CLINIC | Age: 67
End: 2022-12-05

## 2022-12-05 NOTE — TELEPHONE ENCOUNTER
Pt has AWV scheduled for 12/27 with Dr. Polanco, she is out on RITIKA and pt would like fasting lab orders placed so they can make sure their labs are ok prior to being rescheduled with PCP in April.    Brittany- [phone], ok to leave detailed message.     Laura Doe

## 2022-12-06 DIAGNOSIS — E28.2 PCOS (POLYCYSTIC OVARIAN SYNDROME): ICD-10-CM

## 2022-12-06 DIAGNOSIS — K21.9 GASTROESOPHAGEAL REFLUX DISEASE WITHOUT ESOPHAGITIS: ICD-10-CM

## 2022-12-06 DIAGNOSIS — I10 ESSENTIAL HYPERTENSION: ICD-10-CM

## 2022-12-06 DIAGNOSIS — E78.5 HYPERLIPIDEMIA WITH TARGET LDL LESS THAN 130: ICD-10-CM

## 2022-12-06 NOTE — TELEPHONE ENCOUNTER
See  message from 12/6/22.      Upon chart review, pt was prescribed Omeprazole 90 days plus 3 refills on 2/3/22.  Pharmacy should have refill on file for pt.  Called [facility], spoke to pharmacistDavid.  Confirmed they do have refill for pt, will get it ready for pt.     message sent updating pt.    An Peng RN, BSN  [facility]

## 2022-12-08 ENCOUNTER — TELEPHONE (OUTPATIENT)
Dept: FAMILY MEDICINE | Facility: CLINIC | Age: 67
End: 2022-12-08

## 2022-12-08 RX ORDER — HYDROCHLOROTHIAZIDE 12.5 MG/1
CAPSULE ORAL
Qty: 90 CAPSULE | Refills: 1 | Status: SHIPPED | OUTPATIENT
Start: 2022-12-08 | End: 2023-06-23

## 2022-12-08 RX ORDER — SIMVASTATIN 20 MG
TABLET ORAL
Qty: 90 TABLET | Refills: 1 | Status: SHIPPED | OUTPATIENT
Start: 2022-12-08 | End: 2024-05-10

## 2022-12-08 RX ORDER — METFORMIN HCL 500 MG
TABLET, EXTENDED RELEASE 24 HR ORAL
Qty: 90 TABLET | Refills: 1 | Status: SHIPPED | OUTPATIENT
Start: 2022-12-08 | End: 2023-05-12

## 2022-12-08 NOTE — TELEPHONE ENCOUNTER
Duplicate request. Refusing refill request and closing encounter.     Ashley Gay RN on 12/8/2022 at 11:55 AM

## 2022-12-08 NOTE — TELEPHONE ENCOUNTER
Dr Polanco should be back by then.  If I order the labs now the results will come to me.    Please have patient wait until appointment.        Francisco

## 2022-12-08 NOTE — TELEPHONE ENCOUNTER
Patient called stating that her annual wellness exam was postponed until 4/20/23 due to her provider being on a leave of absence. Patient requested that her prescriptions be refilled until her scheduled appointment.     Prescription approved per [facility] Refill Protocol.    Ashley Gay RN on 12/8/2022 at 11:52 AM

## 2022-12-14 ENCOUNTER — MYC MEDICAL ADVICE (OUTPATIENT)
Dept: FAMILY MEDICINE | Facility: CLINIC | Age: 67
End: 2022-12-14

## 2022-12-14 DIAGNOSIS — K21.9 GASTROESOPHAGEAL REFLUX DISEASE WITHOUT ESOPHAGITIS: ICD-10-CM

## 2022-12-14 NOTE — TELEPHONE ENCOUNTER
omeprazole (PRILOSEC) 20 MG DR capsule (Discontinued) 180 capsule 3 2/3/2022 12/8/2022 No   Sig - Route: Take 1 capsule (20 mg) by mouth 2 times daily - Oral     omeprazole (PRILOSEC) 20 MG DR capsule 90 capsule 1 12/8/2022  No   Sig: TAKE 1 CAPSULE(20 MG) BY MOUTH DAILY     Please see [facility]- looks like omeprazole 20 BID was dc'd 12/8 and then reordered on 12/8 to daily. NOT seeing documentation on why this change happened.     Per pt she would like this reordered to BID until seen by Dr. Polanco.   Was scheduled 12/27/2022 now rescheduled to 4/2023    Medication ordered WRONG, please reorder if appropriate.       Eileen REYES RN

## 2022-12-21 DIAGNOSIS — Z00.00 ROUTINE GENERAL MEDICAL EXAMINATION AT A HEALTH CARE FACILITY: ICD-10-CM

## 2022-12-21 DIAGNOSIS — I10 ESSENTIAL HYPERTENSION: ICD-10-CM

## 2022-12-21 DIAGNOSIS — E78.5 HYPERLIPIDEMIA WITH TARGET LDL LESS THAN 130: Primary | ICD-10-CM

## 2022-12-21 NOTE — PROGRESS NOTES
I had a virtual visit with Brittany's  today and Brittany was on the call. Her  will be coming in for his routine physical labs and she requested her routine physical labs as well. They both had appointments for physicals with Dr. Polanco this month but they had to be rescheduled to April as Dr. Polanco is out. They will be spending the winter in [place] but would like their regular labs completed before they leave. Orders placed per past physical labs with PCP.    Angelica Meyers PA-C

## 2023-01-04 ENCOUNTER — TRANSFERRED RECORDS (OUTPATIENT)
Dept: HEALTH INFORMATION MANAGEMENT | Facility: CLINIC | Age: 68
End: 2023-01-04

## 2023-01-19 ENCOUNTER — LAB (OUTPATIENT)
Dept: LAB | Facility: CLINIC | Age: 68
End: 2023-01-19
Payer: MEDICARE

## 2023-01-19 ENCOUNTER — TRANSFERRED RECORDS (OUTPATIENT)
Dept: HEALTH INFORMATION MANAGEMENT | Facility: CLINIC | Age: 68
End: 2023-01-19

## 2023-01-19 DIAGNOSIS — E78.5 HYPERLIPIDEMIA WITH TARGET LDL LESS THAN 130: ICD-10-CM

## 2023-01-19 DIAGNOSIS — Z00.00 ROUTINE GENERAL MEDICAL EXAMINATION AT A HEALTH CARE FACILITY: ICD-10-CM

## 2023-01-19 DIAGNOSIS — I10 ESSENTIAL HYPERTENSION: ICD-10-CM

## 2023-01-19 LAB
ALBUMIN SERPL BCG-MCNC: 4.4 G/DL (ref 3.5–5.2)
ALP SERPL-CCNC: 76 U/L (ref 35–129)
ALT SERPL W P-5'-P-CCNC: 19 U/L (ref 10–50)
ANION GAP SERPL CALCULATED.3IONS-SCNC: 16 MMOL/L (ref 7–15)
AST SERPL W P-5'-P-CCNC: 20 U/L (ref 10–50)
BILIRUB SERPL-MCNC: 0.4 MG/DL
BUN SERPL-MCNC: 20.2 MG/DL (ref 8–23)
CALCIUM SERPL-MCNC: 9.7 MG/DL (ref 8.8–10.2)
CHLORIDE SERPL-SCNC: 102 MMOL/L (ref 98–107)
CHOLEST SERPL-MCNC: 218 MG/DL
CREAT SERPL-MCNC: 0.7 MG/DL (ref 0.51–1.17)
DEPRECATED HCO3 PLAS-SCNC: 23 MMOL/L (ref 22–29)
ERYTHROCYTE [DISTWIDTH] IN BLOOD BY AUTOMATED COUNT: 14.3 % (ref 10–15)
GFR SERPL CREATININE-BSD FRML MDRD: >90 ML/MIN/1.73M2
GLUCOSE SERPL-MCNC: 103 MG/DL (ref 70–99)
HCT VFR BLD AUTO: 41.3 % (ref 35–53)
HDLC SERPL-MCNC: 98 MG/DL
HGB BLD-MCNC: 13.4 G/DL (ref 11.7–17.7)
LDLC SERPL CALC-MCNC: 110 MG/DL
MCH RBC QN AUTO: 27.9 PG (ref 26.5–33)
MCHC RBC AUTO-ENTMCNC: 32.4 G/DL (ref 31.5–36.5)
MCV RBC AUTO: 86 FL (ref 78–100)
NONHDLC SERPL-MCNC: 120 MG/DL
PLATELET # BLD AUTO: 278 10E3/UL (ref 150–450)
POTASSIUM SERPL-SCNC: 4.2 MMOL/L (ref 3.4–5.3)
PROT SERPL-MCNC: 7 G/DL (ref 6.4–8.3)
RBC # BLD AUTO: 4.8 10E6/UL (ref 3.8–5.9)
SODIUM SERPL-SCNC: 141 MMOL/L (ref 136–145)
TRIGL SERPL-MCNC: 52 MG/DL
WBC # BLD AUTO: 5.1 10E3/UL (ref 4–11)

## 2023-01-19 PROCEDURE — 85027 COMPLETE CBC AUTOMATED: CPT

## 2023-01-19 PROCEDURE — 80061 LIPID PANEL: CPT

## 2023-01-19 PROCEDURE — 36415 COLL VENOUS BLD VENIPUNCTURE: CPT

## 2023-01-19 PROCEDURE — 80053 COMPREHEN METABOLIC PANEL: CPT

## 2023-02-12 ENCOUNTER — HEALTH MAINTENANCE LETTER (OUTPATIENT)
Age: 68
End: 2023-02-12

## 2023-03-10 ENCOUNTER — VIRTUAL VISIT (OUTPATIENT)
Dept: URGENT CARE | Facility: CLINIC | Age: 68
End: 2023-03-10
Payer: MEDICARE

## 2023-03-10 ENCOUNTER — NURSE TRIAGE (OUTPATIENT)
Dept: NURSING | Facility: CLINIC | Age: 68
End: 2023-03-10

## 2023-03-10 ENCOUNTER — TELEPHONE (OUTPATIENT)
Dept: NURSING | Facility: CLINIC | Age: 68
End: 2023-03-10

## 2023-03-10 DIAGNOSIS — E78.5 HYPERLIPIDEMIA WITH TARGET LDL LESS THAN 130: ICD-10-CM

## 2023-03-10 DIAGNOSIS — I10 ESSENTIAL HYPERTENSION: ICD-10-CM

## 2023-03-10 DIAGNOSIS — U07.1 INFECTION DUE TO 2019 NOVEL CORONAVIRUS: Primary | ICD-10-CM

## 2023-03-10 PROCEDURE — 99443 PR PHYSICIAN TELEPHONE EVALUATION 21-30 MIN: CPT | Mod: CS

## 2023-03-10 NOTE — TELEPHONE ENCOUNTER
Ibuprofen 800 mg during the night, tylenol 1000 mg this morning    Tested positive today on 03/10/23    Scheduled for Plaxlovid treatment today for 430pm via virtual visit    Temperature is 101.7 by mouth    Triage guidelines recommend to discuss with pcp and callback by nurse within 1 hour    Caller verbalized and understands directives      Reason for Disposition    [1] Fever > 101 F (38.3 C) AND [2] over 60 years of age    Additional Information    Negative: Chest pain or pressure  (Exception: MILD central chest pain, present only when coughing)    Negative: Patient sounds very sick or weak to the triager    Negative: SEVERE or constant chest pain or pressure  (Exception: Mild central chest pain, present only when coughing.)    Negative: MODERATE difficulty breathing (e.g., speaks in phrases, SOB even at rest, pulse 100-120)    Negative: Headache and stiff neck (can't touch chin to chest)    Negative: Oxygen level (e.g., pulse oximetry) 90 percent or lower    Negative: [1] Diagnosed or suspected COVID-19 AND [2] symptoms lasting 3 or more weeks    Negative: [1] COVID-19 exposure AND [2] no symptoms    Negative: COVID-19 vaccine reaction suspected (e.g., fever, headache, muscle aches) occurring 1 to 3 days after getting vaccine    Negative: COVID-19 vaccine, questions about    Negative: [1] Lives with someone known to have influenza (flu test positive) AND [2] flu-like symptoms (e.g., cough, runny nose, sore throat, SOB; with or without fever)    Negative: [1] Adult with possible COVID-19 symptoms AND [2] triager concerned about severity of symptoms or other causes    Negative: COVID-19 and breastfeeding, questions about    Negative: SEVERE difficulty breathing (e.g., struggling for each breath, speaks in single words)    Negative: Difficult to awaken or acting confused (e.g., disoriented, slurred speech)    Negative: Bluish (or gray) lips or face now    Negative: Shock suspected (e.g., cold/pale/clammy skin, too  weak to stand, low BP, rapid pulse)    Negative: Sounds like a life-threatening emergency to the triager    Negative: MILD difficulty breathing (e.g., minimal/no SOB at rest, SOB with walking, pulse <100)    Negative: Fever > 103 F (39.4 C)    Protocols used: CORONAVIRUS (COVID-19) DIAGNOSED OR [id number]

## 2023-03-10 NOTE — PROGRESS NOTES
"  Brittany Chan is a 67 year old adult who is being evaluated via a billable telephone visit.      The patient has been notified of following at the time patient scheduled visit:     \"This telephone visit will be conducted via a phone call between you and your physician/provider. We have found that certain health care needs can be provided without the need for a physical exam.  This service lets us provide the care you need with a phone conversation.  If a prescription is necessary we can send it directly to your pharmacy.  If lab work is needed we can place an order for that and you can then stop by our lab to have the test done at a later time.\"   Patient has given consent for telephone visit?  Yes    SUBJECTIVE:  Brittany Chan is an 67 year old adult who presents for covid.  Yesterday started having some low back ache, headache, body aches and fatigue.  Today some sore throat, fever to 101.7, cough, nausea.  covid test this morning negative, this afternoon positive.  Has taken tylenol which helps some.  No v/d but some intermittent nausea.  Has decreased appetite.  Drinking okay. Has been vaccinated for covid.    PMH:   has a past medical history of Abnormal Pap smear, can't excl hi gd sq intraepithelial lesion (ASC-H) (09/29/2011), Arthritis, Cancer (H), Complication of anesthesia, GERD (gastroesophageal reflux disease), High cholesterol, High cholesterol, Incontinence of urine in female, and Pelvic floor relaxation.  Patient Active Problem List   Diagnosis     Loss of height     Family history of other endocrine and metabolic diseases     Flatulence, eructation, and gas pain     Migraine     Esophageal reflux     PCOS (polycystic ovarian syndrome)     Hemorrhoid     Hyperlipidemia with target LDL less than 130     Family history of diabetes mellitus     Atypical squamous cells cannot exclude high grade squamous intraepithelial lesion on cytologic smear of cervix (ASC-H)     Stress incontinence     " Overweight (BMI 25.0-29.9)     Anxiety     Post-operative state     Osteoarthritis of finger, unspecified laterality     Eczema, unspecified type     Essential hypertension     Social History     Socioeconomic History     Marital status:    Tobacco Use     Smoking status: Former     Packs/day: 0.00     Years: 0.00     Pack years: 0.00     Types: Cigarettes     Quit date: 1980     Years since quittin.2     Smokeless tobacco: Never   Vaping Use     Vaping Use: Never used   Substance and Sexual Activity     Alcohol use: Yes     Comment: 5 a week     Drug use: No     Sexual activity: Yes     Partners: Male     Birth control/protection: None     Comment: menopause, IUD removal needed   Other Topics Concern     Parent/sibling w/ CABG, MI or angioplasty before 65F 55M? No     Social Determinants of Health     Financial Resource Strain: Low Risk      Difficulty of Paying Living Expenses: Not hard at all   Food Insecurity: No Food Insecurity     Worried About Running Out of Food in the Last Year: Never true     Ran Out of Food in the Last Year: Never true   Transportation Needs: No Transportation Needs     Lack of Transportation (Medical): No     Lack of Transportation (Non-Medical): No     Family History   Problem Relation Age of Onset     Hypertension Father      Prostate Cancer Father      Eye Disorder Father         macular degeneration     Diabetes Father      Allergies Mother         nuts     Arthritis Mother      Osteoporosis Mother      Hyperlipidemia Mother      Obesity Sister      Gynecology Sister         polycystic ovaries     Obesity Sister      Diabetes Nephew         type 2     Anxiety Disorder Daughter      Diabetes Nephew        ALLERGIES:  Lisinopril and Thimerosal    Current Outpatient Medications   Medication     calcium carbonate (TUMS) 500 MG chewable tablet     Cholecalciferol (VITAMIN D) 1000 UNIT capsule     COLLAGEN PO     conjugated estrogens (PREMARIN) 0.625 MG/GM vaginal cream      doxycycline monohydrate (ADOXA) 50 MG tablet     estradiol (VAGIFEM) 10 MCG TABS vaginal tablet     famotidine (PEPCID) 20 MG tablet     fish oil-omega-3 fatty acids 1000 MG capsule     Flaxseed (Linseed) (FLAX SEED OIL) 1000 MG capsule     hydrochlorothiazide (MICROZIDE) 12.5 MG capsule     ibuprofen (ADVIL/MOTRIN) 600 MG tablet     Loratadine (CLARITIN PO)     melatonin 3 MG tablet     metFORMIN (GLUCOPHAGE XR) 500 MG 24 hr tablet     mometasone (ELOCON) 0.1 % external cream     multivitamin w/minerals (THERA-VIT-M) tablet     mupirocin (BACTROBAN) 2 % external ointment     omeprazole (PRILOSEC) 20 MG DR capsule     Probiotic Product (PROBIOTIC PO)     simvastatin (ZOCOR) 20 MG tablet     tacrolimus (PROTOPIC) 0.1 % external ointment     triamcinolone (KENALOG) 0.1 % external ointment     No current facility-administered medications for this visit.         ROS:  ROS is done and is negative for general/constitutional, eye, ENT, Respiratory, cardiovascular, GI, , Skin, musculoskeletal except as noted elsewhere.  All other review of systems negative except as noted elsewhere.      OBJECTIVE:    No vital signs taken as is virtual visit.  GENERAL : Alert and oriented.  Did not seem to be in distress.   RESP: No respiratory distress detected during phone conversation         ASSESSMENT/PLAN:    ASSESSMENT / PLAN:  (U07.1) Infection due to 2019 novel coronavirus  (primary encounter diagnosis)  Comment: discussed treatment options and will tx with paxlovid.  No hx ckd  Plan: nirmatrelvir and ritonavir (PAXLOVID) 300         mg/100 mg therapy pack        Stop simvastatin while on paxlovid. Reviewed medication instructions and side effects. Follow up if experiences side effects.. I reviewed supportive care, otc meds to use if needed, expected course, and signs of concern.  Follow up as needed or if she does not improve within 5 day(s) or if worsens in any way.  Reviewed red flag symptoms and is to go to the ER if  experiences any of these.  Reviewed quarantine    (E78.5) Hyperlipidemia with target LDL less than 130  Comment: has hx of  Plan: stop simvastatin while on paxlovid.  Can resume 4-5 days after paxlovid completed    (I10) Essential hypertension  Comment: risk factor for covid complications.  Plan: treat covid as above.  Continue htn meds.          See [facility] for orders, medications, letters, patient instructions    An Leroy MD  3/10/2023, 4:17 PM      Phone call duration:  21 minutes

## 2023-03-10 NOTE — TELEPHONE ENCOUNTER
Nurse Triage SBAR    Situation:   -Medication question     Background:   -Patient calling, It is okay to leave a detailed message at this number.   -Had VUC today with An Leroy    Assessment:   -the provider stated that they would put in a prescription for Paxlovid, but they are at the pharmacy (who is telling them there is no prescription)  -we verified the at the script was sent and to the correct pharmacy    Recommendation:   -had you  go in and check to see if it is there now (it was sent at 1637)  -it you still have issues call us back    TAWNY BOYKIN, RN on 3/10/2023 at 5:58 PM

## 2023-03-10 NOTE — PATIENT INSTRUCTIONS
Do NOT take your simvastatin while you are on Paxlovid.  You can restart your simvastatin 4-5 days after you finish taking Paxlovid.

## 2023-04-05 ENCOUNTER — TRANSFERRED RECORDS (OUTPATIENT)
Dept: HEALTH INFORMATION MANAGEMENT | Facility: CLINIC | Age: 68
End: 2023-04-05
Payer: MEDICARE

## 2023-04-11 ASSESSMENT — ENCOUNTER SYMPTOMS
HEMATURIA: 0
MYALGIAS: 1
DYSURIA: 0
ARTHRALGIAS: 1
NERVOUS/ANXIOUS: 0
DIZZINESS: 1
COUGH: 0
SORE THROAT: 0
SHORTNESS OF BREATH: 0
NAUSEA: 0
HEADACHES: 0
CONSTIPATION: 0
CHILLS: 0
PALPITATIONS: 0
HEMATOCHEZIA: 0
JOINT SWELLING: 1
EYE PAIN: 0
DIARRHEA: 0
ABDOMINAL PAIN: 0
HEARTBURN: 1
WEAKNESS: 1
BREAST MASS: 0
FREQUENCY: 1
FEVER: 0
PARESTHESIAS: 0

## 2023-04-11 ASSESSMENT — ACTIVITIES OF DAILY LIVING (ADL): CURRENT_FUNCTION: NO ASSISTANCE NEEDED

## 2023-04-14 ENCOUNTER — OFFICE VISIT (OUTPATIENT)
Dept: FAMILY MEDICINE | Facility: CLINIC | Age: 68
End: 2023-04-14
Payer: MEDICARE

## 2023-04-14 VITALS
OXYGEN SATURATION: 97 % | HEART RATE: 59 BPM | BODY MASS INDEX: 27.27 KG/M2 | DIASTOLIC BLOOD PRESSURE: 68 MMHG | SYSTOLIC BLOOD PRESSURE: 124 MMHG | RESPIRATION RATE: 13 BRPM | WEIGHT: 163.7 LBS | TEMPERATURE: 97.4 F | HEIGHT: 65 IN

## 2023-04-14 DIAGNOSIS — Z87.898 HX OF SYNCOPE: ICD-10-CM

## 2023-04-14 DIAGNOSIS — N95.2 POST-MENOPAUSAL ATROPHIC VAGINITIS: ICD-10-CM

## 2023-04-14 DIAGNOSIS — K21.9 GASTROESOPHAGEAL REFLUX DISEASE WITHOUT ESOPHAGITIS: ICD-10-CM

## 2023-04-14 DIAGNOSIS — I10 ESSENTIAL HYPERTENSION: ICD-10-CM

## 2023-04-14 DIAGNOSIS — R73.01 ELEVATED FASTING GLUCOSE: ICD-10-CM

## 2023-04-14 DIAGNOSIS — Z78.0 ASYMPTOMATIC MENOPAUSAL STATE: ICD-10-CM

## 2023-04-14 DIAGNOSIS — E78.5 HYPERLIPIDEMIA WITH TARGET LDL LESS THAN 130: ICD-10-CM

## 2023-04-14 DIAGNOSIS — E28.2 PCOS (POLYCYSTIC OVARIAN SYNDROME): ICD-10-CM

## 2023-04-14 DIAGNOSIS — Z00.00 ENCOUNTER FOR MEDICARE ANNUAL WELLNESS EXAM: Primary | ICD-10-CM

## 2023-04-14 PROCEDURE — G0438 PPPS, INITIAL VISIT: HCPCS | Performed by: STUDENT IN AN ORGANIZED HEALTH CARE EDUCATION/TRAINING PROGRAM

## 2023-04-14 PROCEDURE — 99214 OFFICE O/P EST MOD 30 MIN: CPT | Mod: 25 | Performed by: STUDENT IN AN ORGANIZED HEALTH CARE EDUCATION/TRAINING PROGRAM

## 2023-04-14 ASSESSMENT — ENCOUNTER SYMPTOMS
PALPITATIONS: 0
ABDOMINAL PAIN: 0
JOINT SWELLING: 1
FREQUENCY: 1
SORE THROAT: 0
DIZZINESS: 1
NERVOUS/ANXIOUS: 0
NAUSEA: 0
DYSURIA: 0
ARTHRALGIAS: 1
DIARRHEA: 0
CHILLS: 0
HEMATURIA: 0
EYE PAIN: 0
CONSTIPATION: 0
MYALGIAS: 1
COUGH: 0
WEAKNESS: 1
SHORTNESS OF BREATH: 0
FEVER: 0
HEADACHES: 0

## 2023-04-14 ASSESSMENT — ACTIVITIES OF DAILY LIVING (ADL): CURRENT_FUNCTION: NO ASSISTANCE NEEDED

## 2023-04-14 ASSESSMENT — PAIN SCALES - GENERAL: PAINLEVEL: NO PAIN (0)

## 2023-04-14 NOTE — PROGRESS NOTES
"SUBJECTIVE:   Brittany is a 68 year old who presents for Preventive Visit.      4/14/2023     9:53 AM   Additional Questions   Roomed by MR   Accompanied by Spouse         4/14/2023     9:53 AM   Patient Reported Additional Medications   Patient reports taking the following new medications NA   Patient has been advised of split billing requirements and indicates understanding: Yes  Are you in the first 12 months of your Medicare coverage?  No    Healthy Habits:     In general, how would you rate your overall health?  Good    Frequency of exercise:  2-3 days/week    Duration of exercise:  30-45 minutes    Do you usually eat at least 4 servings of fruit and vegetables a day, include whole grains    & fiber and avoid regularly eating high fat or \"junk\" foods?  No    Taking medications regularly:  Yes    Medication side effects:  None and Lightheadedness    Ability to successfully perform activities of daily living:  No assistance needed    Home Safety:  No safety concerns identified    Hearing Impairment:  No hearing concerns    In the past 6 months, have you been bothered by leaking of urine? Yes    In general, how would you rate your overall mental or emotional health?  Excellent      PHQ-2 Total Score: 0    Additional concerns today:  Yes    Recently had rotator cuff surgery.    Vasovagal syncope  Had cardiac workup last year after fainting in the bathroom.    One month ago  Recently had COVID in March, was nauseated and felt like vomiting.   Was dehydrated at the time too  Went to the bathroom to throw up in toilet but passed out when got there.  Hit head on toilet and got a black eye.   Woke up right away. Was not postictal.  Did not feel heart palpitations at this time.  Did not have smartwatch at that exact moment but was noted to be mildly tachycardic on smartwatch during COVID illness and attributed to fever and dehydration.  Hasn't happened before (since ED visit) or after that time.    PCOS  On metformin for " prophylactic use.   Has recently gained weight after the rotator cuff surgery.   Recent glucometer use at home stating  while fasting.   Would like a1c in 2 months    GERD  Omeprazole BID and famotidine prn   No dysphagia symptoms    HLD  Simvastatin, doing well.    HTN  On hydrochlorothiazide, no issues    Ob/gyn  Not taking both vaginal estrogen prescriptions together.   When traveling will take pills, twice week.  Otherwise will use th cream.   Will be seeing Dr. Kusum Moe, ob/gyn soon    Have you ever done Advance Care Planning? (For example, a Health Directive, POLST, or a discussion with a medical provider or your loved ones about your wishes): Yes, advance care planning is on file.    Fall risk  Fallen 2 or more times in the past year?: No  Any fall with injury in the past year?: Yes    Cognitive Screening   1) Repeat 3 items (Leader, Season, Table)    2) Clock draw: NORMAL  3) 3 item recall: Recalls 3 objects  Results: 3 items recalled: COGNITIVE IMPAIRMENT LESS LIKELY    Do you have sleep apnea, excessive snoring or daytime drowsiness?: no    Reviewed and updated as needed this visit by clinical staff   Tobacco  Allergies  Meds   Med Hx  Surg Hx  Fam Hx  Soc Hx        Reviewed and updated as needed this visit by Provider   Tobacco  Allergies  Meds   Med Hx  Surg Hx  Fam Hx         Social History     Tobacco Use     Smoking status: Former     Packs/day: 0.00     Years: 0.00     Pack years: 0.00     Types: Cigarettes     Quit date: 1980     Years since quittin.3     Smokeless tobacco: Never   Vaping Use     Vaping status: Never Used     Passive vaping exposure: Yes   Substance Use Topics     Alcohol use: Yes     Comment: 5 a week         2023    12:02 PM   Alcohol Use   Prescreen: >3 drinks/day or >7 drinks/week? No     Do you have a current opioid prescription? No  Do you use any other controlled substances or medications that are not prescribed by a provider? None    Current  providers sharing in care for this patient include:   Patient Care Team:  Sadaf Polanco MD as PCP - General (Family Practice)  Sadaf Polanco MD as Assigned PCP  Aditya Agrawal MD as MD (Gastroenterology)  Aditya Agrawal MD as Assigned Gastroenterology Provider    The following health maintenance items are reviewed in Epic and correct as of today:  Health Maintenance   Topic Date Due     HEPATITIS B IMMUNIZATION (1 of 3 - Risk 3-dose series) Never done     Pneumococcal Vaccine: 65+ Years (1 - PCV) Never done     COVID-19 Vaccine (5 - Booster for Pfizer series) 08/14/2022     DTAP/TDAP/TD IMMUNIZATION (2 - Td or Tdap) 10/29/2023     ANNUAL REVIEW OF HM ORDERS  11/17/2023     MEDICARE ANNUAL WELLNESS VISIT  04/14/2024     FALL RISK ASSESSMENT  04/14/2024     MAMMO SCREENING  11/07/2024     LIPID  01/19/2028     ADVANCE CARE PLANNING  04/14/2028     COLORECTAL CANCER SCREENING  09/26/2029     DEXA  04/08/2031     HEPATITIS C SCREENING  Completed     MIGRAINE ACTION PLAN  Completed     PHQ-2 (once per calendar year)  Completed     INFLUENZA VACCINE  Completed     ZOSTER IMMUNIZATION  Completed     IPV IMMUNIZATION  Aged Out     MENINGITIS IMMUNIZATION  Aged Out     PAP  Discontinued       FHS-7:       11/5/2021     8:03 AM 11/10/2021     9:03 AM 11/16/2021     8:15 AM 11/7/2022     8:11 AM 11/17/2022    10:00 AM 4/11/2023    12:04 PM   Breast CA Risk Assessment (FHS-7)   Did any of your first-degree relatives have breast or ovarian cancer? No No No No No No   Did any of your relatives have bilateral breast cancer? No No No No No No   Did any man in your family have breast cancer? No No No No No No   Did any woman in your family have breast and ovarian cancer? No No No No Yes No   Did any woman in your family have breast cancer before age 50 y? No No No No No No   Do you have 2 or more relatives with breast and/or ovarian cancer? Yes Yes Yes No Yes Yes   Do you have 2 or more  "relatives with breast and/or bowel cancer? No No No No Yes No     Mammogram Screening: Recommended mammography every 1-2 years with patient discussion and risk factor consideration  Pertinent mammograms are reviewed under the imaging tab.    Review of Systems   Constitutional: Negative for chills and fever.   HENT: Negative for congestion, ear pain, hearing loss and sore throat.    Eyes: Positive for visual disturbance. Negative for pain.   Respiratory: Negative for cough and shortness of breath.    Cardiovascular: Negative for chest pain and palpitations.   Gastrointestinal: Negative for abdominal pain, constipation, diarrhea and nausea.   Genitourinary: Positive for frequency and urgency. Negative for dysuria, genital sores and hematuria.   Musculoskeletal: Positive for arthralgias, joint swelling and myalgias.   Skin: Negative for rash.   Neurological: Positive for dizziness and weakness. Negative for headaches.   Psychiatric/Behavioral: The patient is not nervous/anxious.      OBJECTIVE:   /68 (BP Location: Right arm, Patient Position: Sitting, Cuff Size: Adult Large)   Pulse 59   Temp 97.4  F (36.3  C) (Oral)   Resp 13   Ht 1.645 m (5' 4.75\")   Wt 74.3 kg (163 lb 11.2 oz)   LMP 10/05/2012   SpO2 97%   BMI 27.45 kg/m   Estimated body mass index is 27.45 kg/m  as calculated from the following:    Height as of this encounter: 1.645 m (5' 4.75\").    Weight as of this encounter: 74.3 kg (163 lb 11.2 oz).     Physical Exam  GENERAL: healthy, alert and no distress  HEAD: Normocephalic, atraumatic.   EYES: PERRL. Normal conjunctivae, sclera.   ENT: Normal EAC and TMs bilaterally. Normal oropharynx.   NECK: Supple. No lymphadenopathy appreciated. Trachea midline. Thyroid not enlarged, not TTP.  RESP: lungs clear to auscultation - no rales, rhonchi or wheezes  CV: regular rate and rhythm, normal S1 S2, no murmur, click, rub or gallop. No carotid bruits. No peripheral swelling noted.   ABDOMEN: soft, no TTP " x4 quadrants. No hepatomegaly or masses appreciated. BS normactive.  MSK: no gross musculoskeletal defects noted.  SKIN: no suspicious lesions or rashes.  EXT: Warm and well perfused.   NEURO: CNII-XII grossly intact. No focal deficits.  PSYCH: Groomed, dressed appropriately for weather. Normal mood with consistent affect.     ASSESSMENT / PLAN:   (Z00.00) Encounter for Medicare annual wellness exam  (primary encounter diagnosis)  (Z78.0) Asymptomatic menopausal state  Doing well overall. Discussed labs from 1/2023 including CBC, CMP, lipid. UTD on mammogram and CRC. Due for DEXA, ordered.   Plan: Lipid panel reflex to direct LDL Fasting  Plan: DX Hip/Pelvis/Spine    (E28.2) PCOS (polycystic ovarian syndrome)  (R73.01) Elevated fasting glucose  Taking 500mg metformin daily for hx of PCOS. At home fasting sugars mildly elevated per patient and increased over the past couple of months. Last A1c on 11/2022 and WNL. Will plan for repeat A1c.  Plan: Hemoglobin A1c    (I10) Essential hypertension  Well controlled on hydrochlorothiazide. Last BMP on 1/2023 WNL. Ok to refill when needed.    (E78.5) Hyperlipidemia with target LDL less than 130  Currently on 20mg simvastatin, since 2007.  No hx of LDL>190 per review. CT calcium score of 0 on 2/2022.  Plan to stop statin and recheck lipid panel in 6 weeks to recalculate need for lipid therapy.    (Z87.898) Hx of syncope  2x in past. Had cardiac workup in the past in ED and with PCP follow up (CBC, troponin, D-dimer, EKG, CXR, exercise stress test) and all unremarkable. Had one additional syncopal episode in setting of COVID illness and significant dehydration since then. Story consistent with hypotensive or vasovagal syncope. Defer additional workup today. Discussed if this occurs again, may need re-evaluated with either cardiology, neurology or both.     (K21.9) Gastroesophageal reflux disease without esophagitis  On omeprazole BID and H2B prn for symptoms. EGD in 2022 with  "hiatal hernia. No red flag symptoms. Continue current regimen.    (N95.2) Post-menopausal atrophic vaginitis  On vaginal estrogen for symptoms.    Plan to stop simvastatin, recheck lipid in 2months. No fam hx. Then recheck in 2 months along with a1c    COUNSELING:  Reviewed preventive health counseling, as reflected in patient instructions    BMI:   Estimated body mass index is 27.45 kg/m  as calculated from the following:    Height as of this encounter: 1.645 m (5' 4.75\").    Weight as of this encounter: 74.3 kg (163 lb 11.2 oz).     She reports that she quit smoking about 43 years ago. Her smoking use included cigarettes. She has never used smokeless tobacco.    Appropriate preventive services were discussed with this patient, including applicable screening as appropriate for cardiovascular disease, diabetes, osteopenia/osteoporosis, and glaucoma.  As appropriate for age/gender, discussed screening for colorectal cancer, prostate cancer, breast cancer, and cervical cancer. Checklist reviewing preventive services available has been given to the patient.    Reviewed patients plan of care and provided an AVS. The Intermediate Care Plan ( asthma action plan, low back pain action plan, and migraine action plan) for Brittany meets the Care Plan requirement. This Care Plan has been established and reviewed with the Patient.          Leonel Costa MD  [facility]    Identified Health Risks:    I have reviewed Opioid Use Disorder and Substance Use Disorder risk factors and made any needed referrals.       The patient was counseled and encouraged to consider modifying their diet and eating habits. She was provided with information on recommended healthy diet options.  Information on urinary incontinence and treatment options given to patient.  Information on urinary incontinence and treatment options given to patient.  "

## 2023-04-14 NOTE — PATIENT INSTRUCTIONS
Plan to make lab only visit for 2 months from today for fasting cholesterol and A1c.  Stop simvastatin, we will discuss after results.        Patient Education   Personalized Prevention Plan  You are due for the preventive services outlined below.  Your care team is available to assist you in scheduling these services.  If you have already completed any of these items, please share that information with your care team to update in your medical record.  Health Maintenance Due   Topic Date Due     Hepatitis B Vaccine (1 of 3 - Risk 3-dose series) Never done     Pneumococcal Vaccine (1 - PCV) Never done     COVID-19 Vaccine (5 - Booster for Pfizer series) 08/14/2022     Annual Wellness Visit  11/16/2022        Patient Education   Personalized Prevention Plan  You are due for the preventive services outlined below.  Your care team is available to assist you in scheduling these services.  If you have already completed any of these items, please share that information with your care team to update in your medical record.  Health Maintenance Due   Topic Date Due     Hepatitis B Vaccine (1 of 3 - Risk 3-dose series) Never done     Pneumococcal Vaccine (1 - PCV) Never done     COVID-19 Vaccine (5 - Booster for Pfizer series) 08/14/2022       Understanding USDA MyPlate  The USDA has guidelines to help you make healthy food choices. These are called MyPlate. MyPlate shows the food groups that make up healthy meals using the image of a place setting. Before you eat, think about the healthiest choices for what to put on your plate or in your cup or bowl. To learn more about building a healthy plate, visit www.choosemyplate.gov.     The food groups    Fruits. Any fruit or 100% fruit juice counts as part of the Fruit Group. Fruits may be fresh, canned, frozen, or dried, and may be whole, cut-up, or pureed. Make 1/2 of your plate fruits and vegetables.    Vegetables. Any vegetable or 100% vegetable juice counts as a member of the  Vegetable Group. Vegetables may be fresh, frozen, canned, or dried. They can be served raw or cooked and may be whole, cut-up, or mashed. Make 1/2 of your plate fruits and vegetables.    Grains. All foods made from grains are part of the Grains Group. These include wheat, rice, oats, cornmeal, and barley. Grains are often used to make foods such as bread, pasta, oatmeal, cereal, tortillas, and grits. Grains should be no more than 1/4 of your plate. At least half of your grains should be whole grains.    Protein. This group includes meat, poultry, seafood, beans and peas, eggs, processed soy products (such as tofu), nuts (including nut butters), and seeds. Make protein choices no more than 1/4 of your plate. Meat and poultry choices should be lean or low fat.    Dairy. The Dairy Group includes all fluid milk products and foods made from milk that contain calcium, such as yogurt and cheese. (Foods that have little calcium, such as cream, butter, and cream cheese, are not part of this group.) Most dairy choices should be low-fat or fat-free.    Oils. Oils aren't a food group, but they do contain essential nutrients. However it's important to watch your intake of oils. These are fats that are liquid at room temperature. They include canola, corn, olive, soybean, vegetable, and sunflower oil. Foods that are mainly oil include mayonnaise, certain salad dressings, and soft margarines. You likely already get your daily oil allowance from the foods you eat.  Things to limit  Eating healthy also means limiting these things in your diet:    Salt (sodium). Many processed foods have a lot of sodium. To keep sodium intake down, eat fresh vegetables, meats, poultry, and seafood when possible. Purchase low-sodium, reduced-sodium, or no-salt-added food products at the store. And don't add salt to your meals at home. Instead, season them with herbs and spices such as dill, oregano, cumin, and paprika. Or try adding flavor with lemon  or lime zest and juice.    Saturated fat. Saturated fats are most often found in animal products such as beef, pork, and chicken. They are often solid at room temperature, such as butter. To reduce your saturated fat intake, choose leaner cuts of meat and poultry. And try healthier cooking methods such as grilling, broiling, roasting, or baking. For a simple lower-fat swap, use plain nonfat yogurt instead of mayonnaise when making potato salad or macaroni salad.    Added sugars. These are sugars added to foods. They are in foods such as ice cream, candy, soda, fruit drinks, sports drinks, energy drinks, cookies, pastries, jams, and syrups. Cut down on added sugars by sharing sweet treats with a family member or friend. You can also choose fruit for dessert, and drink water or other unsweetened beverages.  [employer] last reviewed this educational content on 6/1/2020 2000-2022 [employer]. All rights reserved. This information is not intended as a substitute for professional medical care. Always follow your healthcare professional's instructions.          Urinary Incontinence (Male)  We understand that gender is a spectrum. We may use gendered terms to talk about anatomy and health risk. Please use this sheet in a way that works best for you and your provider as you talk about your care.     Urinary incontinence means not being able to control the release of urine from the bladder.   Causes  Common causes of urinary incontinence in men include:    Infection    Certain medicines    Aging    Poor pelvic muscle tone    Bladder spasms    Obesity    Enlarged prostate    Trouble urinating and fully emptying the bladder (urinary retention)  Other things that can cause incontinence are:     Nervous system diseases    Diabetes    Sleep apnea    Urinary tract infections    Prostate surgery    Pelvic injury  Constipation and smoking have also been identified as risk factors.   Symptoms    Urge incontinence  (overactive bladder). This is a sudden urge to urinate. It occurs even though there may not be much urine in the bladder. The need to urinate often during the night is common. It's due to overactive bladder muscles.    Stress incontinence. This is urine leakage that you can't control. It can occur with sneezing, coughing, and other actions that put stress on the bladder.    Treatment  Treatment depends on what is causing the condition. Bladder infections are treated with antibiotics. Urinary retention is treated with a bladder catheter.   Home care  Follow these guidelines when caring for yourself at home:    Don't have any foods and drinks that may irritate the bladder. This includes:  ? Chocolate  ? Alcohol  ? Caffeine  ? Carbonated drinks  ? Acidic fruits and juices    Limit fluids to 6 to 8 cups a day.    Lose weight if you are overweight. This may reduce your symptoms.    If advised, do regular pelvic muscle-strengthening exercises such as Kegel exercises.    If needed, wear absorbent pads to catch urine. Change the pads often. This is for good hygiene and to prevent skin and bladder infections.    Bathe daily for good hygiene.    If an antibiotic was prescribed to treat a bladder infection, take it until it's finished. Keep taking it even if you are feeling better. This is to make sure your infection has cleared.    If a catheter was left in place, keep bacteria from getting into the collection bag. Don't disconnect the catheter from the collection bag.    Use a leg band to secure the catheter drainage tube, so it does not pull on the catheter. Drain the collection bag when it becomes full. To do this, use the drain spout at the bottom of the bag. Don't disconnect the bag from the catheter.    Don't pull on or try to remove a catheter. The catheter must be removed by a healthcare provider.    If you smoke, stop. Ask your provider for help if you can't do this on your own.  Follow-up care  Follow up with your  healthcare provider, or as advised.  When to get medical advice  Call your healthcare provider right away if any of these occur:     Fever over 100.4 F (38 C), or as directed by your provider    Bladder pain or fullness    Belly swelling, nausea, or vomiting    Back pain    Weakness, dizziness, or fainting    If a catheter was left in place, return if:  ? The catheter falls out  ? The catheter stops draining for 6 hours  ? Your urine gets cloudy or smells bad  [employer] last reviewed this educational content on 6/1/2022 2000-2022 [employer]. All rights reserved. This information is not intended as a substitute for professional medical care. Always follow your healthcare professional's instructions.          Urinary Incontinence, Female (Adult)   Urinary incontinence means loss of bladder control. This problem affects many women, especially as they get older. If you have incontinence, you may be embarrassed to ask for help. But know that this problem can be treated.   Types of Incontinence  There are different types of incontinence. Two of the main types are described here. You can have more than one type.     Stress incontinence. With this type, urine leaks when pressure (stress) is put on the bladder. This may happen when you cough, sneeze, or laugh. Stress incontinence most often occurs because the pelvic floor muscles that support the bladder and urethra are weak. This can happen after pregnancy and vaginal childbirth or a hysterectomy. It can also be due to excess body weight or hormone changes.    Urge incontinence (also called overactive bladder). With this type, a sudden urge to urinate is felt often. This may happen even though there may not be much urine in the bladder. The need to urinate often during the night is common. Urge incontinence most often occurs because of bladder spasms. This may be due to bladder irritation or infection. Damage to bladder nerves or pelvic muscles,  constipation, and certain medicines can also lead to urge incontinence.  Treatment depends on the cause. Further evaluation is needed to find the type you have. This will likely include an exam and certain tests. Based on the results, you and your healthcare provider can then plan treatment. Until a diagnosis is made, the home care tips below can help ease symptoms.   Home care    Do pelvic floor muscle exercises, if they are prescribed. The pelvic floor muscles help support the bladder and urethra. Many women find that their symptoms improve when doing special exercises that strengthen these muscles. To do the exercises, contract the muscles you would use to stop your stream of urine. But do this when you re not urinating. Hold for 10 seconds, then relax. Repeat 10 to 20 times in a row, at least 3 times a day. Your healthcare provider may give you other instructions for how to do the exercises and how often.    Keep a bladder diary. This helps track how often and how much you urinate over a set period of time. Bring this diary with you to your next visit with the provider. The information can help your provider learn more about your bladder problem.    Lose weight, if advised to by your provider. Extra weight puts pressure on the bladder. Your provider can help you create a weight-loss plan that s right for you. This may include exercising more and making certain diet changes.    Don't have foods and drinks that may irritate the bladder. These can include alcohol and caffeinated drinks.    Quit smoking. Smoking and other tobacco use can lead to a long-term (chronic) cough that strains the pelvic floor muscles. Smoking may also damage the bladder and urethra. Talk with your provider about treatments or methods you can use to quit smoking.    If drinking large amounts of fluid makes you have symptoms, you may be advised to limit your fluid intake. You may also be advised to drink most of your fluids during the day  and to limit fluids at night.    If you re worried about urine leakage or accidents, you may wear absorbent pads to catch urine. Change the pads often. This helps reduce discomfort. It may also reduce the risk of skin or bladder infections.    Follow-up care  Follow up with your healthcare provider, or as directed. It may take some to find the right treatment for your problem. But healthy lifestyle changes can be made right away. These include such things as exercising on a regular basis, eating a healthy diet, losing weight (if needed), and quitting smoking. Your treatment plan may include special therapies or medicines. Certain procedures or surgery may also be options. Talk about any questions you have with your provider.   When to seek medical advice  Call the healthcare provider right away if any of these occur:    Fever of 100.4 F (38 C) or higher, or as directed by your provider    Bladder pain or fullness    Belly swelling    Nausea or vomiting    Back pain    Weakness, dizziness, or fainting  [employer] last reviewed this educational content on 1/1/2020 2000-2022 [employer]. All rights reserved. This information is not intended as a substitute for professional medical care. Always follow your healthcare professional's instructions.

## 2023-04-17 PROBLEM — N95.2 POST-MENOPAUSAL ATROPHIC VAGINITIS: Status: ACTIVE | Noted: 2023-04-17

## 2023-05-04 ENCOUNTER — LAB (OUTPATIENT)
Dept: LAB | Facility: CLINIC | Age: 68
End: 2023-05-04
Payer: MEDICARE

## 2023-05-04 ENCOUNTER — MYC MEDICAL ADVICE (OUTPATIENT)
Dept: FAMILY MEDICINE | Facility: CLINIC | Age: 68
End: 2023-05-04

## 2023-05-04 DIAGNOSIS — R73.01 ELEVATED FASTING GLUCOSE: ICD-10-CM

## 2023-05-04 DIAGNOSIS — E78.5 HYPERLIPIDEMIA WITH TARGET LDL LESS THAN 130: Primary | ICD-10-CM

## 2023-05-04 DIAGNOSIS — Z00.00 ENCOUNTER FOR MEDICARE ANNUAL WELLNESS EXAM: ICD-10-CM

## 2023-05-04 LAB
CHOLEST SERPL-MCNC: 315 MG/DL
HBA1C MFR BLD: 5.2 % (ref 0–5.6)
HDLC SERPL-MCNC: 105 MG/DL
LDLC SERPL CALC-MCNC: 197 MG/DL
NONHDLC SERPL-MCNC: 210 MG/DL
TRIGL SERPL-MCNC: 67 MG/DL

## 2023-05-04 PROCEDURE — 83036 HEMOGLOBIN GLYCOSYLATED A1C: CPT

## 2023-05-04 PROCEDURE — 36415 COLL VENOUS BLD VENIPUNCTURE: CPT

## 2023-05-04 PROCEDURE — 80061 LIPID PANEL: CPT

## 2023-05-04 NOTE — TELEPHONE ENCOUNTER
Routed to Dr Leonel Costa, please review  message and advise.    An Peng RN, BSN  [facility]

## 2023-05-05 RX ORDER — SIMVASTATIN 20 MG
20 TABLET ORAL AT BEDTIME
Qty: 90 TABLET | Refills: 3 | Status: SHIPPED | OUTPATIENT
Start: 2023-05-05 | End: 2024-05-06

## 2023-05-11 DIAGNOSIS — E28.2 PCOS (POLYCYSTIC OVARIAN SYNDROME): ICD-10-CM

## 2023-05-12 RX ORDER — METFORMIN HCL 500 MG
TABLET, EXTENDED RELEASE 24 HR ORAL
Qty: 90 TABLET | Refills: 3 | Status: SHIPPED | OUTPATIENT
Start: 2023-05-12 | End: 2024-05-06

## 2023-05-23 ENCOUNTER — TRANSFERRED RECORDS (OUTPATIENT)
Dept: HEALTH INFORMATION MANAGEMENT | Facility: CLINIC | Age: 68
End: 2023-05-23
Payer: MEDICARE

## 2023-06-01 ENCOUNTER — ANCILLARY PROCEDURE (OUTPATIENT)
Dept: BONE DENSITY | Facility: CLINIC | Age: 68
End: 2023-06-01
Attending: STUDENT IN AN ORGANIZED HEALTH CARE EDUCATION/TRAINING PROGRAM
Payer: MEDICARE

## 2023-06-01 DIAGNOSIS — Z78.0 ASYMPTOMATIC MENOPAUSAL STATE: ICD-10-CM

## 2023-06-01 PROCEDURE — 77080 DXA BONE DENSITY AXIAL: CPT | Performed by: INTERNAL MEDICINE

## 2023-06-05 PROBLEM — M85.89 OSTEOPENIA OF MULTIPLE SITES: Status: ACTIVE | Noted: 2023-06-05

## 2023-06-21 DIAGNOSIS — I10 ESSENTIAL HYPERTENSION: ICD-10-CM

## 2023-06-23 RX ORDER — HYDROCHLOROTHIAZIDE 12.5 MG/1
CAPSULE ORAL
Qty: 90 CAPSULE | Refills: 1 | Status: SHIPPED | OUTPATIENT
Start: 2023-06-23 | End: 2023-11-08

## 2023-06-23 NOTE — TELEPHONE ENCOUNTER
Prescription approved per [facility] Refill Protocol.  Priya HEDRICK RN, BSN

## 2023-08-11 ENCOUNTER — LAB (OUTPATIENT)
Dept: LAB | Facility: CLINIC | Age: 68
End: 2023-08-11
Payer: MEDICARE

## 2023-08-11 DIAGNOSIS — E78.5 HYPERLIPIDEMIA WITH TARGET LDL LESS THAN 130: ICD-10-CM

## 2023-08-11 LAB
CHOLEST SERPL-MCNC: 217 MG/DL
HDLC SERPL-MCNC: 105 MG/DL
LDLC SERPL CALC-MCNC: 101 MG/DL
NONHDLC SERPL-MCNC: 112 MG/DL
TRIGL SERPL-MCNC: 57 MG/DL

## 2023-08-11 PROCEDURE — 36415 COLL VENOUS BLD VENIPUNCTURE: CPT

## 2023-08-11 PROCEDURE — 80061 LIPID PANEL: CPT

## 2023-10-05 ENCOUNTER — TRANSFERRED RECORDS (OUTPATIENT)
Dept: HEALTH INFORMATION MANAGEMENT | Facility: CLINIC | Age: 68
End: 2023-10-05
Payer: MEDICARE

## 2023-10-06 ENCOUNTER — TRANSFERRED RECORDS (OUTPATIENT)
Dept: HEALTH INFORMATION MANAGEMENT | Facility: CLINIC | Age: 68
End: 2023-10-06
Payer: MEDICARE

## 2023-10-18 ENCOUNTER — OFFICE VISIT (OUTPATIENT)
Dept: FAMILY MEDICINE | Facility: CLINIC | Age: 68
End: 2023-10-18
Payer: MEDICARE

## 2023-10-18 VITALS
SYSTOLIC BLOOD PRESSURE: 128 MMHG | BODY MASS INDEX: 27.16 KG/M2 | DIASTOLIC BLOOD PRESSURE: 70 MMHG | HEIGHT: 65 IN | HEART RATE: 76 BPM | RESPIRATION RATE: 12 BRPM | TEMPERATURE: 97.8 F | OXYGEN SATURATION: 100 % | WEIGHT: 163 LBS

## 2023-10-18 DIAGNOSIS — Z01.818 PREOP GENERAL PHYSICAL EXAM: Primary | ICD-10-CM

## 2023-10-18 DIAGNOSIS — H35.373: ICD-10-CM

## 2023-10-18 LAB — POTASSIUM SERPL-SCNC: 5.1 MMOL/L (ref 3.4–5.3)

## 2023-10-18 PROCEDURE — 84132 ASSAY OF SERUM POTASSIUM: CPT | Performed by: STUDENT IN AN ORGANIZED HEALTH CARE EDUCATION/TRAINING PROGRAM

## 2023-10-18 PROCEDURE — 99214 OFFICE O/P EST MOD 30 MIN: CPT | Performed by: STUDENT IN AN ORGANIZED HEALTH CARE EDUCATION/TRAINING PROGRAM

## 2023-10-18 PROCEDURE — 36415 COLL VENOUS BLD VENIPUNCTURE: CPT | Performed by: STUDENT IN AN ORGANIZED HEALTH CARE EDUCATION/TRAINING PROGRAM

## 2023-10-18 RX ORDER — RESPIRATORY SYNCYTIAL VIRUS VACCINE 120MCG/0.5
0.5 KIT INTRAMUSCULAR ONCE
Qty: 1 EACH | Refills: 0 | Status: CANCELLED | OUTPATIENT
Start: 2023-10-18 | End: 2023-10-18

## 2023-10-18 ASSESSMENT — PAIN SCALES - GENERAL: PAINLEVEL: NO PAIN (0)

## 2023-10-18 NOTE — PROGRESS NOTES
[facility]  [address] [facility] [address] [facility] [address]  Phone: [phone]  Primary Provider: Leonel Stacy  Pre-op Performing Provider: LEONEL STACY    PREOPERATIVE EVALUATION:  Today's date: 10/18/2023    Brittany is a 68 year old adult who presents for a preoperative evaluation.      10/18/2023     9:04 AM   Additional Questions   Roomed by Kacy RANGEL     Would like to discuss RSV and covid vaccine.    Surgical Information:  Surgery/Procedure: vitrectomy, membrane stripping, air fluid exchange, brilliant blue dye, possible laser due to epiretial membrane  Surgery Location: [facility]  Surgeon: Terrance Arellano  Surgery Date: 10/25/23  Time of Surgery: 7:45am  Where patient plans to recover: At home with family  Fax number for surgical facility: [phone]    Assessment & Plan     The proposed surgical procedure is considered LOW risk.    Preop general physical exam  Macular retinal puckering, bilateral  - Potassium; Future     - No identified additional risk factors other than previously addressed    Antiplatelet or Anticoagulation Medication Instructions:   - Patient is on no antiplatelet or anticoagulation medications.    Additional Medication Instructions:  Patient is to take all scheduled medications on the day of surgery EXCEPT for modifications listed below:  Continue all prescription medications (takes metformin qPM)  Hold metamucil on day of surgery    RECOMMENDATION:  APPROVAL GIVEN to proceed with proposed procedure, without further diagnostic evaluation.    Follow up in 6 weeks for pre op     Leonel Stacy MD  [facility]  10/18/2023    Subjective     HPI related to upcoming procedure:   Vision concerns, following with ophthalmology and has upcoming surgery for 10/25/23        10/11/2023     8:48 AM   Preop Questions   1. Have you ever had a heart attack or stroke? No   2. Have you ever had surgery on your heart or blood vessels, such as a  stent placement, a coronary artery bypass, or surgery on an artery in your head, neck, heart, or legs? No   3. Do you have chest pain with activity? No   4. Do you have a history of  heart failure? No   5. Do you currently have a cold, bronchitis or symptoms of other infection? No   6. Do you have a cough, shortness of breath, or wheezing? No   7. Do you or anyone in your family have previous history of blood clots? No   8. Do you or does anyone in your family have a serious bleeding problem such as prolonged bleeding following surgeries or cuts? No   9. Have you ever had problems with anemia or been told to take iron pills? No   10. Have you had any abnormal blood loss such as black, tarry or bloody stools, or abnormal vaginal bleeding? No   11. Have you ever had a blood transfusion? No   12. Are you willing to have a blood transfusion if it is medically needed before, during, or after your surgery? Yes   13. Have you or any of your relatives ever had problems with anesthesia? No   14. Do you have sleep apnea, excessive snoring or daytime drowsiness? No   15. Do you have any artifical heart valves or other implanted medical devices like a pacemaker, defibrillator, or continuous glucose monitor? No   16. Do you have artificial joints? No   17. Are you allergic to latex? No     Health Care Directive:  Patient has a Health Care Directive on file    Preoperative Review of :   reviewed - no record of controlled substances prescribed.    Status of Chronic Conditions:  HYPERLIPIDEMIA - Patient has a long history of significant hyperlipidemia requiring medication for treatment with recent good control. Patient reports no problems or side effects with the medication.     Review of Systems  Constitutional, neuro, ENT, endocrine, pulmonary, cardiac, gastrointestinal, genitourinary, musculoskeletal, integument and psychiatric systems are negative, except as otherwise noted.  Positive for eye changes - reason for upcoming  surgery    Patient Active Problem List    Diagnosis Date Noted    Osteopenia of multiple sites 06/05/2023     Priority: Medium     Based on 6/2023 DEXA scan.    FINDINGS:               Lumbar Spine (L1-L4)      T-score:  0.6, marked degenerative changes present                Left Femoral Neck            T-score:  -1.4               Right Femoral Neck          T-score:  -1.3  This patient's risks based on available information, with the use of FRAX, are 8.8 % for major osteoporotic fracture and 0.9 % for hip fracture.   Based on these guidelines, treatment (in addition to calcium and vitamin D) is not recommended for this patient, after ruling out other causes of osteoporosis.      Post-menopausal atrophic vaginitis 04/17/2023     Priority: Medium    Osteoarthritis of finger, unspecified laterality 07/28/2021     Priority: Medium    Eczema, unspecified type 07/28/2021     Priority: Medium    Essential hypertension 07/28/2021     Priority: Medium    Anxiety 05/31/2016     Priority: Medium    Stress incontinence 10/12/2012     Priority: Medium    Overweight (BMI 25.0-29.9) 10/12/2012     Priority: Medium    Family history of diabetes mellitus 09/29/2011     Priority: Medium     Father, borderline, lost weight and went away      Atypical squamous cells cannot exclude high grade squamous intraepithelial lesion on cytologic smear of cervix (ASC-H) 09/29/2011     Priority: Medium     09/29/11 ASC-H: referred for colposcopy by 01/2012  10/28/11 Colposcopy done with [facility], abstracted in chart. Per patient, results were unremarkable and will be having follow up pap with them by April 2012 05/16/12 Pap= Normal (abstracted into chart)  10/12/12 Dx pap= Normal. Plan r/p 1 year- due 10/2013  10/29/13 Dx pap= ASCUS, Negative HPV. Routine screening.  01/19/15 Pap= ASCUS, Neg HPV. Co-test 1 yr per Dr. Polanco  03/18/16 Pap= NIL, Neg HPV. 3 yr co-test  8/15/19 Pap: NIL/neg HR HPV. Pap in 3 years      Hyperlipidemia with  target LDL less than 130 10/31/2010     Priority: Medium     LDL >190 off statin medication in 2023  On simvastatin, 20 mg      Hemorrhoid 06/04/2009     Priority: Medium     Mild internal hemorrhoids      PCOS (polycystic ovarian syndrome) 04/28/2009     Priority: Medium     H/o PCOS & irregular periods in past.       Esophageal reflux 01/19/2007     Priority: Medium    Loss of height 08/04/2003     Priority: Medium    Family history of other endocrine and metabolic diseases 08/04/2003     Priority: Medium     Problem list name updated by automated process. Provider to review and confirm      Flatulence, eructation, and gas pain 08/04/2003     Priority: Medium    Migraine 08/04/2003     Priority: Medium     Problem list name updated by automated process. Provider to review        Past Medical History:   Diagnosis Date    Abnormal Pap smear, can't excl hi gd sq intraepithelial lesion (ASC-H) 09/29/2011    Arthritis     Cancer (H)     Complication of anesthesia     Gets tachycardic from epi in Dental numbing injections    GERD (gastroesophageal reflux disease)     High cholesterol     High cholesterol     Incontinence of urine in female     Pelvic floor relaxation      Past Surgical History:   Procedure Laterality Date    BREAST LUMPECTOMY, RT/LT  1978    lt fibroadenoma    COLONOSCOPY      COLONOSCOPY Left 9/26/2019    Procedure: COLONOSCOPY;  Surgeon: Renita Pablo MD;  Location:  GI    DAVINCI PELVIC PROCEDURE N/A 12/14/2020    Procedure: Robotic assisted sacrocolpopexy, supracervical hysterectomy with bilateral salpingo-oophorectomy, abdominal enterocele repair, midurethral sling, cystoscopy, and  posterior repair;  Surgeon: Gail Yang MD;  Location:  OR    ESOPHAGOSCOPY, GASTROSCOPY, DUODENOSCOPY (EGD), COMBINED N/A 8/5/2016    Procedure: COMBINED ESOPHAGOSCOPY, GASTROSCOPY, DUODENOSCOPY (EGD);  Surgeon: Feroz Ramirez MD;  Location:  GI    ESOPHAGOSCOPY, GASTROSCOPY, DUODENOSCOPY  (EGD), COMBINED N/A 5/18/2022    Procedure: ESOPHAGOGASTRODUODENOSCOPY, WITH BIOPSY;  Surgeon: Aditya Agrawal MD;  Location:  GI    ORTHOPEDIC SURGERY Right 2014    rotator cuff repair    TONSILLECTOMY & ADENOIDECTOMY       Current Outpatient Medications   Medication Sig Dispense Refill    Cholecalciferol (VITAMIN D) 1000 UNIT capsule Take 2 capsules by mouth daily.       COLLAGEN PO       conjugated estrogens (PREMARIN) 0.625 MG/GM vaginal cream Place 0.5 g vaginally twice a week 30 g 11    estradiol (VAGIFEM) 10 MCG TABS vaginal tablet INSERT 1 TABLET(10 MCG) VAGINALLY 2 TIMES A WEEK 25 tablet 0    famotidine (PEPCID) 20 MG tablet Take 20 mg by mouth 2 times daily      fish oil-omega-3 fatty acids 1000 MG capsule Take 1 g by mouth 2 times daily      Flaxseed (Linseed) (FLAX SEED OIL) 1000 MG capsule Take 1 capsule by mouth daily      hydrochlorothiazide (MICROZIDE) 12.5 MG capsule TAKE 1 CAPSULE(12.5 MG) BY MOUTH DAILY 90 capsule 1    Loratadine (CLARITIN PO) Take 10 mg by mouth daily as needed       melatonin 3 MG tablet Take 3 mg by mouth nightly as needed for sleep      metFORMIN (GLUCOPHAGE XR) 500 MG 24 hr tablet TAKE 1 TABLET(500 MG) BY MOUTH DAILY WITH DINNER 90 tablet 3    mometasone (ELOCON) 0.1 % external cream       multivitamin w/minerals (THERA-VIT-M) tablet Take 1 tablet by mouth daily      mupirocin (BACTROBAN) 2 % external ointment Apply topically 3 times daily 15 g 1    omeprazole (PRILOSEC) 20 MG DR capsule TAKE 1 CAPSULE(20 MG) BY MOUTH TWICE DAILY 180 capsule 2    Probiotic Product (PROBIOTIC PO) Take by mouth daily       simvastatin (ZOCOR) 20 MG tablet Take 1 tablet (20 mg) by mouth At Bedtime 90 tablet 3    simvastatin (ZOCOR) 20 MG tablet TAKE 1 TABLET(20 MG) BY MOUTH AT BEDTIME 90 tablet 1    ibuprofen (ADVIL/MOTRIN) 600 MG tablet Take 1 tablet (600 mg) by mouth every 6 hours as needed for moderate pain 30 tablet 0    tacrolimus (PROTOPIC) 0.1 % external ointment APPLY THIN  "LAYER TOPICALLY TO THE AFFECTED AREA TWICE DAILY UNTIL GONE      triamcinolone (KENALOG) 0.1 % external ointment Apply topically 2 times daily 30 g 3     Allergies   Allergen Reactions    Lisinopril Cough    Thimerosal      preservative in contact solution -      Social History     Tobacco Use    Smoking status: Former     Packs/day: 0.00     Years: 0.00     Additional pack years: 0.00     Total pack years: 0.00     Types: Cigarettes     Quit date: 1980     Years since quittin.8    Smokeless tobacco: Never   Substance Use Topics    Alcohol use: Yes     Comment: 5 a week     History   Drug Use No         Objective     /70 (BP Location: Right arm, Patient Position: Sitting, Cuff Size: Adult Large)   Pulse 76   Temp 97.8  F (36.6  C) (Oral)   Resp 12   Ht 1.645 m (5' 4.75\")   Wt 73.9 kg (163 lb)   LMP 10/05/2012   SpO2 100%   BMI 27.33 kg/m      Physical Exam    GENERAL APPEARANCE: healthy, alert and no distress     EYES: EOMI, PERRL     HENT: ear canals and TM's normal and nose and mouth without ulcers or lesions     NECK: no adenopathy, no asymmetry, masses, or scars and thyroid normal to palpation     RESP: lungs clear to auscultation - no rales, rhonchi or wheezes     CV: regular rates and rhythm, normal S1 S2, no S3 or S4 and no murmur, click or rub     ABDOMEN:  soft, nontender, no HSM or masses and bowel sounds normal     MS: extremities normal- no gross deformities noted, no evidence of inflammation in joints, FROM in all extremities.     SKIN: no suspicious lesions or rashes     NEURO: Normal strength and tone, sensory exam grossly normal, mentation intact and speech normal     PSYCH: mentation appears normal. and affect normal/bright     LYMPHATICS: No cervical adenopathy    Recent Labs   Lab Test 23  0933 23  0833 22  1212 22  0930   HGB  --  13.4 14.2 14.9   PLT  --  278  --  287   NA  --  141 137 138   POTASSIUM  --  4.2 4.4 4.2   CR  --  0.70 0.65 0.62   A1C " 5.2  --  5.3  --       Diagnostics:  Labs pending at this time.  Results will be reviewed when available.   No EKG required, no history of coronary heart disease, significant arrhythmia, peripheral arterial disease or other structural heart disease.    Revised Cardiac Risk Index (RCRI):  The patient has the following serious cardiovascular risks for perioperative complications:   - No serious cardiac risks = 0 points     RCRI Interpretation: 0 points: Class I (very low risk - 0.4% complication rate)    Signed Electronically by: Doug Stacy MD  Copy of this evaluation report is provided to requesting physician.    [facility]  [address] [address] [facility] [address]  Phone: [phone]  Primary Provider: Doug Stacy  Pre-op Performing Provider: DOUG STACY

## 2023-10-18 NOTE — PATIENT INSTRUCTIONS
Preparing for Your Surgery  Getting started  A nurse will call you to review your health history and instructions. They will give you an arrival time based on your scheduled surgery time. Please be ready to share:  Your doctor's clinic name and phone number  Your medical, surgical, and anesthesia history  A list of allergies and sensitivities  A list of medicines, including herbal treatments and over-the-counter drugs  Whether the patient has a legal guardian (ask how to send us the papers in advance)  Please tell us if you're pregnant--or if there's any chance you might be pregnant. Some surgeries may injure a fetus (unborn baby), so they require a pregnancy test. Surgeries that are safe for a fetus don't always need a test, and you can choose whether to have one.   If you have a child who's having surgery, please ask for a copy of Preparing for Your Child's Surgery.    Preparing for surgery  Within 10 to 30 days of surgery: Have a pre-op exam (sometimes called an H&P, or History and Physical). This can be done at a clinic or pre-operative center.  If you're having a , you may not need this exam. Talk to your care team.  At your pre-op exam, talk to your care team about all medicines you take. If you need to stop any medicines before surgery, ask when to start taking them again.  We do this for your safety. Many medicines can make you bleed too much during surgery. Some change how well surgery (anesthesia) drugs work.  Call your insurance company to let them know you're having surgery. (If you don't have insurance, call [phone].)  Call your clinic if there's any change in your health. This includes signs of a cold or flu (sore throat, runny nose, cough, rash, fever). It also includes a scrape or scratch near the surgery site.  If you have questions on the day of surgery, call your hospital or surgery center.  Eating and drinking guidelines  For your safety: Unless your surgeon tells you otherwise,  follow the guidelines below.  Eat and drink as usual until 8 hours before you arrive for surgery. After that, no food or milk.  Drink clear liquids until 2 hours before you arrive. These are liquids you can see through, like water, Gatorade, and Propel Water. They also include plain black coffee and tea (no cream or milk), candy, and breath mints. You can spit out gum when you arrive.  If you drink alcohol: Stop drinking it the night before surgery.  If your care team tells you to take medicine on the morning of surgery, it's okay to take it with a sip of water.  Preventing infection  Shower or bathe the night before and morning of your surgery. Follow the instructions your clinic gave you. (If no instructions, use regular soap.)  Don't shave or clip hair near your surgery site. We'll remove the hair if needed.  Don't smoke or vape the morning of surgery. You may chew nicotine gum up to 2 hours before surgery. A nicotine patch is okay.  Note: Some surgeries require you to completely quit smoking and nicotine. Check with your surgeon.  Your care team will make every effort to keep you safe from infection. We will:  Clean our hands often with soap and water (or an alcohol-based hand rub).  Clean the skin at your surgery site with a special soap that kills germs.  Give you a special gown to keep you warm. (Cold raises the risk of infection.)  Wear special hair covers, masks, gowns and gloves during surgery.  Give antibiotic medicine, if prescribed. Not all surgeries need antibiotics.  What to bring on the day of surgery  Photo ID and insurance card  Copy of your health care directive, if you have one  Glasses and hearing aids (bring cases)  You can't wear contacts during surgery  Inhaler and eye drops, if you use them (tell us about these when you arrive)  CPAP machine or breathing device, if you use them  A few personal items, if spending the night  If you have . . .  A pacemaker, ICD (cardiac defibrillator) or other  implant: Bring the ID card.  An implanted stimulator: Bring the remote control.  A legal guardian: Bring a copy of the certified (court-stamped) guardianship papers.  Please remove any jewelry, including body piercings. Leave jewelry and other valuables at home.  If you're going home the day of surgery  You must have a responsible adult drive you home. They should stay with you overnight as well.  If you don't have someone to stay with you, and you aren't safe to go home alone, we may keep you overnight. Insurance often won't pay for this.  After surgery  If it's hard to control your pain or you need more pain medicine, please call your surgeon's office.  Questions?   If you have any questions for your care team, list them here: _________________________________________________________________________________________________________________________________________________________________________ ____________________________________ ____________________________________ ____________________________________  For informational purposes only. Not to replace the advice of your health care provider. Copyright   2003, 2019 [employer]. All rights reserved. Clinically reviewed by Elisabeth Lott MD. [employer] 773174 - REV 12/22.  How to Take Your Medication Before Surgery  - HOLD (do not take) your METFORMIN on the morning of surgery.  Hold metamucil on day of surgery

## 2023-10-28 NOTE — TELEPHONE ENCOUNTER
Prescription approved per [facility] Refill Protocol.    Kathleen Lima RN    
28-Oct-2023 12:15

## 2023-11-06 DIAGNOSIS — I10 ESSENTIAL HYPERTENSION: ICD-10-CM

## 2023-11-08 ENCOUNTER — HOSPITAL ENCOUNTER (OUTPATIENT)
Dept: MAMMOGRAPHY | Facility: CLINIC | Age: 68
Discharge: HOME | End: 2023-11-08
Attending: STUDENT IN AN ORGANIZED HEALTH CARE EDUCATION/TRAINING PROGRAM | Admitting: STUDENT IN AN ORGANIZED HEALTH CARE EDUCATION/TRAINING PROGRAM
Payer: MEDICARE

## 2023-11-08 DIAGNOSIS — Z12.31 VISIT FOR SCREENING MAMMOGRAM: ICD-10-CM

## 2023-11-08 PROCEDURE — 77067 SCR MAMMO BI INCL CAD: CPT

## 2023-11-08 RX ORDER — HYDROCHLOROTHIAZIDE 12.5 MG/1
CAPSULE ORAL
Qty: 90 CAPSULE | Refills: 0 | Status: SHIPPED | OUTPATIENT
Start: 2023-11-08 | End: 2024-02-05

## 2024-01-09 ENCOUNTER — MYC REFILL (OUTPATIENT)
Dept: FAMILY MEDICINE | Facility: CLINIC | Age: 69
End: 2024-01-09
Payer: MEDICARE

## 2024-01-09 DIAGNOSIS — K21.9 GASTROESOPHAGEAL REFLUX DISEASE WITHOUT ESOPHAGITIS: ICD-10-CM

## 2024-01-09 DIAGNOSIS — I10 ESSENTIAL HYPERTENSION: ICD-10-CM

## 2024-01-09 RX ORDER — HYDROCHLOROTHIAZIDE 12.5 MG/1
1 CAPSULE ORAL DAILY
Qty: 90 CAPSULE | Refills: 0 | OUTPATIENT
Start: 2024-01-09

## 2024-01-09 RX ORDER — HYDROCHLOROTHIAZIDE 12.5 MG/1
CAPSULE ORAL
Qty: 90 CAPSULE | Refills: 0 | OUTPATIENT
Start: 2024-01-09

## 2024-01-23 ENCOUNTER — E-VISIT (OUTPATIENT)
Dept: FAMILY MEDICINE | Facility: CLINIC | Age: 69
End: 2024-01-23
Payer: MEDICARE

## 2024-01-23 ENCOUNTER — NURSE TRIAGE (OUTPATIENT)
Dept: FAMILY MEDICINE | Facility: CLINIC | Age: 69
End: 2024-01-23

## 2024-01-23 DIAGNOSIS — Z53.9 DIAGNOSIS NOT YET DEFINED: Primary | ICD-10-CM

## 2024-01-23 DIAGNOSIS — U07.1 INFECTION DUE TO 2019 NOVEL CORONAVIRUS: Primary | ICD-10-CM

## 2024-01-23 PROCEDURE — 99207 PR NON-BILLABLE SERV PER CHARTING: CPT | Performed by: STUDENT IN AN ORGANIZED HEALTH CARE EDUCATION/TRAINING PROGRAM

## 2024-01-23 NOTE — TELEPHONE ENCOUNTER
Provider E-Visit time total (minutes): 2 minutes.    Triage,    Please call patient to help her with Paxlovid treatment.     Thanks,    Leonel Costa MD  [facility]  1/23/2024

## 2024-01-23 NOTE — PATIENT INSTRUCTIONS
"Thank you for choosing us for your care.     Please call the clinic at [phone] and request Paxlovid treatment. You may be able to qualify through the nurse protocol. If not, they will help you set up a virtual visit for Paxlovid treatment.    I will cancel this e-visit as a \"no charge\".    I hope you feel better soon!    Dr. Castaneda     "

## 2024-01-23 NOTE — TELEPHONE ENCOUNTER
RN COVID TREATMENT VISIT  01/23/24      The patient has been triaged and does not require a higher level of care.    Brittany Chan  68 year old  Current weight? 163 lbs    Has the patient been seen by a primary care provider at an [facility] or [facility] within the past two years? Yes.   Have you been in close proximity to/do you have a known exposure to a person with a confirmed case of influenza? No.     General treatment eligibility:  Date of positive COVID test (PCR or at home)?  1-    Are you or have you been hospitalized for this COVID-19 infection? No.   Have you received monoclonal antibodies or antiviral treatment for COVID-19 since this positive test? No.   Do you have any of the following conditions that place you at risk of being very sick from COVID-19?   - Age 50 years or older  - Heart conditions such as cardiomyopathies, congenital heart defects, coronary artery disease, heart arrhythmias, heart failure, hypertension, valve disorders   - Overweight or Obesity (BMI >85th percentile or BMI 25 or higher)  Yes, patient has at least one high risk condition as noted above.     Current COVID symptoms:   - fever or chills  - cough  - muscle or body aches  - congestion or runny nose  Yes. Patient has at least one symptom as selected.     How many days since symptoms started? 5 days or less. Established patient, 12 years or older weighing at least 88.2 lbs, who has symptoms that started in the past 5 days, has not been hospitalized nor received treatment already, and is at risk for being very sick from COVID-19.     Treatment eligibility by RN:  Are you currently pregnant or nursing? No  Do you have a clinically significant hypersensitivity to nirmatrelvir or ritonavir, or toxic epidermal necrolysis (TEN) or Michael-Harley Syndrome? No  Do you have a history of hepatitis, any hepatic impairment on the Problem List (such as Child-Hale Class C, cirrhosis, fatty liver  disease, alcoholic liver disease), or was the last liver lab (hepatic panel, ALT, AST, ALK Phos, bilirubin) elevated in the past 6 months? No  Do you have any history of severe renal impairment (eGFR < 30mL/min)? No    Is patient eligible to continue? Yes, patient meets all eligibility requirements for the RN COVID treatment (as denoted by all no responses above).     Current Outpatient Medications   Medication Sig Dispense Refill    Cholecalciferol (VITAMIN D) 1000 UNIT capsule Take 2 capsules by mouth daily.       COLLAGEN PO       conjugated estrogens (PREMARIN) 0.625 MG/GM vaginal cream Place 0.5 g vaginally twice a week 30 g 11    estradiol (VAGIFEM) 10 MCG TABS vaginal tablet INSERT 1 TABLET(10 MCG) VAGINALLY 2 TIMES A WEEK 25 tablet 0    famotidine (PEPCID) 20 MG tablet Take 20 mg by mouth 2 times daily      fish oil-omega-3 fatty acids 1000 MG capsule Take 1 g by mouth 2 times daily      Flaxseed (Linseed) (FLAX SEED OIL) 1000 MG capsule Take 1 capsule by mouth daily      hydrochlorothiazide (MICROZIDE) 12.5 MG capsule TAKE 1 CAPSULE(12.5 MG) BY MOUTH DAILY 90 capsule 0    Loratadine (CLARITIN PO) Take 10 mg by mouth daily as needed       melatonin 3 MG tablet Take 3 mg by mouth nightly as needed for sleep      metFORMIN (GLUCOPHAGE XR) 500 MG 24 hr tablet TAKE 1 TABLET(500 MG) BY MOUTH DAILY WITH DINNER 90 tablet 3    mometasone (ELOCON) 0.1 % external cream       multivitamin w/minerals (THERA-VIT-M) tablet Take 1 tablet by mouth daily      mupirocin (BACTROBAN) 2 % external ointment Apply topically 3 times daily 15 g 1    omeprazole (PRILOSEC) 20 MG DR capsule TAKE 1 CAPSULE(20 MG) BY MOUTH TWICE DAILY 180 capsule 0    Probiotic Product (PROBIOTIC PO) Take by mouth daily       simvastatin (ZOCOR) 20 MG tablet Take 1 tablet (20 mg) by mouth At Bedtime 90 tablet 3    simvastatin (ZOCOR) 20 MG tablet TAKE 1 TABLET(20 MG) BY MOUTH AT BEDTIME 90 tablet 1    tacrolimus (PROTOPIC) 0.1 % external ointment APPLY  THIN LAYER TOPICALLY TO THE AFFECTED AREA TWICE DAILY UNTIL GONE      triamcinolone (KENALOG) 0.1 % external ointment Apply topically 2 times daily 30 g 3       Medications from List 1 of the standing order (on medications that exclude the use of Paxlovid) that patient is taking: NONE. Is patient taking Sabi's Wort? No  Is patient taking Gapland's Wort or any meds from List 1? No.   Medications from List 2 of the standing order (on meds that provider needs to adjust) that patient is taking: NONE. Is patient on any of the meds from List 2? No.   Medications from List 3 of standing order (on meds that a RN needs to adjust) that patient is taking: simvastatin (Zocor, FloLipid): Instructed patient to stop taking simvastatin while taking Paxlovid and first dose of Paxlovid must be at least 12 hours after last dose of simvastatin.  Instructed to restart simvastatin 5 days after the completion of Paxlovid.  Is patient on any meds from List 3? Yes. Patient is on meds from list 3. No meds require a provider visit and at least one med required RN to adjust.     Paxlovid has an approximate 90% reduction in hospitalization. Paxlovid can possibly cause altered sense of taste, diarrhea (loose, watery stools), high blood pressure, muscle aches.     Would patient like a Paxlovid prescription?   Yes.   Lab Results   Component Value Date    GFRESTIMATED >90 01/19/2023       Was last eGFR reduced? No, eGFR 60 or greater/ No Result on record. Patient can receive the normal renal function dose. Paxlovid Rx sent to [facility]   Preferred    Temporary change to home medications: See above    All medication adjustments (holds, etc) were discussed with the patient and patient was asked to repeat back (teachback) their med adjustment.  Did patient understand med adjustment? Yes, patient repeated back and understood correctly.        Reviewed the following instructions with the patient:    Paxlovid (nimatrelvir and  ritonavir)    How it works  Two medicines (nirmatrelvir and ritonavir) are taken together. They stop the virus from growing. Less amount of virus is easier for your body to fight.    How to take  Medicine comes in a daily container with both medicine tablets. Take by mouth twice daily (once in the morning, once at night) for 5 days.  The number of tablets to take varies by patient.  Don't chew or break capsules. Swallow whole.    When to take  Take as soon as possible after positive COVID-19 test result, and within 5 days of your first symptoms.    Possible side effects  Can cause altered sense of taste, diarrhea (loose, watery stools), high blood pressure, muscle aches.    Gail Black RN

## 2024-01-23 NOTE — TELEPHONE ENCOUNTER
Patient requesting Paxlovid for positive Covid 19 test. Received Paxlovid last year per patient.    Onset symptoms 2 days.    Kathleen Lima RN        Reason for Disposition   COVID-19 diagnosed by positive lab test (e.g., PCR, rapid self-test kit) and mild symptoms (e.g., cough, fever, others) and no complications or SOB    Additional Information   Negative: SEVERE difficulty breathing (e.g., struggling for each breath, speaks in single words)   Negative: Difficult to awaken or acting confused (e.g., disoriented, slurred speech)   Negative: Bluish (or gray) lips or face now   Negative: Shock suspected (e.g., cold/pale/clammy skin, too weak to stand, low BP, rapid pulse)   Negative: Sounds like a life-threatening emergency to the triager   Negative: Diagnosed or suspected COVID-19 and symptoms lasting 3 or more weeks   Negative: COVID-19 exposure and no symptoms   Negative: COVID-19 vaccine reaction suspected (e.g., fever, headache, muscle aches) occurring 1 to 3 days after getting vaccine   Negative: COVID-19 vaccine, questions about   Negative: Lives with someone known to have influenza (flu test positive) and flu-like symptoms (e.g., cough, runny nose, sore throat, SOB; with or without fever)   Negative: Possible COVID-19 symptoms and triager concerned about severity of symptoms or other causes   Negative: COVID-19 and breastfeeding, questions about   Negative: SEVERE or constant chest pain or pressure  (Exception: Mild central chest pain, present only when coughing.)   Negative: MODERATE difficulty breathing (e.g., speaks in phrases, SOB even at rest, pulse 100-120)   Negative: Headache and stiff neck (can't touch chin to chest)   Negative: Oxygen level (e.g., pulse oximetry) 90% or lower   Negative: Chest pain or pressure  (Exception: MILD central chest pain, present only when coughing.)   Negative: Drinking very little and dehydration suspected (e.g., no urine > 12 hours, very dry mouth, very lightheaded)    "Negative: Patient sounds very sick or weak to the triager   Negative: MILD difficulty breathing (e.g., minimal/no SOB at rest, SOB with walking, pulse <100)   Negative: Fever > 103 F (39.4 C)   Negative: Fever > 101 F (38.3 C) and over 60 years of age   Negative: Fever > 100.0 F (37.8 C) and bedridden (e.g., CVA, chronic illness, recovering from surgery)   Negative: HIGH RISK patient (e.g., weak immune system, age > 64 years, obesity with BMI of 30 or higher, pregnant, chronic lung disease or other chronic medical condition) and COVID symptoms (e.g., cough, fever)  (Exceptions: Already seen by doctor or NP/PA and no new or worsening symptoms.)   Negative: HIGH RISK patient and influenza is widespread in the community and ONE OR MORE respiratory symptoms: cough, sore throat, runny or stuffy nose   Negative: HIGH RISK patient and influenza exposure within the last 7 days and ONE OR MORE respiratory symptoms: cough, sore throat, runny or stuffy nose   Negative: Oxygen level (e.g., pulse oximetry) 91 to 94%   Negative: COVID-19 infection suspected by caller or triager and mild symptoms (cough, fever, or others) and negative COVID-19 rapid test   Negative: Fever present > 3 days (72 hours)   Negative: Fever returns after gone for over 24 hours and symptoms worse or not improved   Negative: Continuous (nonstop) coughing interferes with work or school and no improvement using cough treatment per Care Advice   Negative: Cough present > 3 weeks    Answer Assessment - Initial Assessment Questions  1. COVID-19 DIAGNOSIS: \"How do you know that you have COVID?\" (e.g., positive lab test or self-test, diagnosed by doctor or NP/PA, symptoms after exposure).      Positive home Covid test today  2. COVID-19 EXPOSURE: \"Was there any known exposure to COVID before the symptoms began?\" CDC Definition of close contact: within 6 feet (2 meters) for a total of 15 minutes or more over a 24-hour period.       Unknown  3. ONSET: \"When did the " "COVID-19 symptoms start?\"       2 days ago  4. WORST SYMPTOM: \"What is your worst symptom?\" (e.g., cough, fever, shortness of breath, muscle aches)      HA  5. COUGH: \"Do you have a cough?\" If Yes, ask: \"How bad is the cough?\"        Yes. Frequent harsh cough  6. FEVER: \"Do you have a fever?\" If Yes, ask: \"What is your temperature, how was it measured, and when did it start?\"      101.9. digital oral thermometer  7. RESPIRATORY STATUS: \"Describe your breathing?\" (e.g., normal; shortness of breath, wheezing, unable to speak)       Normal. Denies SOB  8. BETTER-SAME-WORSE: \"Are you getting better, staying the same or getting worse compared to yesterday?\"  If getting worse, ask, \"In what way?\"      Worse. Higher fever  9. OTHER SYMPTOMS: \"Do you have any other symptoms?\"  (e.g., chills, fatigue, headache, loss of smell or taste, muscle pain, sore throat)      Congestion, fever, body aches  10. HIGH RISK DISEASE: \"Do you have any chronic medical problems?\" (e.g., asthma, heart or lung disease, weak immune system, obesity, etc.)        Obesity, HTN  11. VACCINE: \"Have you had the COVID-19 vaccine?\" If Yes, ask: \"Which one, how many shots, when did you get it?\"     3 shots.    2022.  12. PREGNANCY: \"Is there any chance you are pregnant?\" \"When was your last menstrual period?\"        NA  13. O2 SATURATION MONITOR:  \"Do you use an oxygen saturation monitor (pulse oximeter) at home?\" If Yes, ask \"What is your reading (oxygen level) today?\" \"What is your usual oxygen saturation reading?\" (e.g., 95%)        No    Protocols used: Coronavirus (COVID-19) Diagnosed or [id number]    "

## 2024-02-04 DIAGNOSIS — I10 ESSENTIAL HYPERTENSION: ICD-10-CM

## 2024-02-05 RX ORDER — HYDROCHLOROTHIAZIDE 12.5 MG/1
CAPSULE ORAL
Qty: 90 CAPSULE | Refills: 0 | Status: SHIPPED | OUTPATIENT
Start: 2024-02-05 | End: 2024-05-06

## 2024-02-23 ENCOUNTER — TRANSFERRED RECORDS (OUTPATIENT)
Dept: HEALTH INFORMATION MANAGEMENT | Facility: CLINIC | Age: 69
End: 2024-02-23
Payer: MEDICARE

## 2024-02-29 ENCOUNTER — E-VISIT (OUTPATIENT)
Dept: FAMILY MEDICINE | Facility: CLINIC | Age: 69
End: 2024-02-29
Payer: MEDICARE

## 2024-02-29 ENCOUNTER — MYC MEDICAL ADVICE (OUTPATIENT)
Dept: FAMILY MEDICINE | Facility: CLINIC | Age: 69
End: 2024-02-29
Payer: MEDICARE

## 2024-02-29 DIAGNOSIS — Z71.84 TRAVEL ADVICE ENCOUNTER: Primary | ICD-10-CM

## 2024-02-29 PROCEDURE — 99207 PR NON-BILLABLE SERV PER CHARTING: CPT

## 2024-03-01 NOTE — TELEPHONE ENCOUNTER
Provider E-Visit time total (minutes): 2    Please help set up appt with [facility].    Thanks,    Leonel Costa MD  [facility]  3/1/2024

## 2024-03-08 ENCOUNTER — MYC REFILL (OUTPATIENT)
Dept: FAMILY MEDICINE | Facility: CLINIC | Age: 69
End: 2024-03-08
Payer: MEDICARE

## 2024-03-08 DIAGNOSIS — K21.9 GASTROESOPHAGEAL REFLUX DISEASE WITHOUT ESOPHAGITIS: ICD-10-CM

## 2024-03-08 NOTE — TELEPHONE ENCOUNTER
Last sent on 1/9/24 for 90 days, should last until 4/9/24.  Please contact pt and find out if they will be back in country in time for refill.  If early refill needed, please route to provider.    An Peng RN, BSN  [facility]

## 2024-03-08 NOTE — TELEPHONE ENCOUNTER
Called pt. Pt states she is leaving the country on the 30 of March and will not be back until May. Needing a refill on omeprazole. Pt states she has already spoken with her insurance and refill should go through.     Routing to PCP to review and advise.       Prema Arrieta RN

## 2024-03-13 ENCOUNTER — OFFICE VISIT (OUTPATIENT)
Dept: FAMILY MEDICINE | Facility: CLINIC | Age: 69
End: 2024-03-13
Payer: MEDICARE

## 2024-03-13 VITALS
HEIGHT: 65 IN | WEIGHT: 162 LBS | DIASTOLIC BLOOD PRESSURE: 74 MMHG | RESPIRATION RATE: 16 BRPM | HEART RATE: 72 BPM | TEMPERATURE: 98 F | BODY MASS INDEX: 26.99 KG/M2 | OXYGEN SATURATION: 99 % | SYSTOLIC BLOOD PRESSURE: 141 MMHG

## 2024-03-13 DIAGNOSIS — Z23 NEED FOR DIPHTHERIA-TETANUS-PERTUSSIS (TDAP) VACCINE: ICD-10-CM

## 2024-03-13 DIAGNOSIS — Z71.84 ENCOUNTER FOR COUNSELING FOR TRAVEL: Primary | ICD-10-CM

## 2024-03-13 PROCEDURE — 90715 TDAP VACCINE 7 YRS/> IM: CPT | Mod: GA | Performed by: PHYSICIAN ASSISTANT

## 2024-03-13 PROCEDURE — 90471 IMMUNIZATION ADMIN: CPT | Mod: GA | Performed by: PHYSICIAN ASSISTANT

## 2024-03-13 PROCEDURE — 99401 PREV MED CNSL INDIV APPRX 15: CPT | Mod: 25 | Performed by: PHYSICIAN ASSISTANT

## 2024-03-13 RX ORDER — AZITHROMYCIN 500 MG/1
TABLET, FILM COATED ORAL
Qty: 3 TABLET | Refills: 0 | Status: SHIPPED | OUTPATIENT
Start: 2024-03-13 | End: 2024-05-10

## 2024-03-13 NOTE — NURSING NOTE
Prior to immunization administration, verified patients identity using patient s name and date of birth. Please see Immunization Activity for additional information.     Screening Questionnaire for Adult Immunization    Are you sick today?   No   Do you have allergies to medications, food, a vaccine component or latex?   Yes   Have you ever had a serious reaction after receiving a vaccination?   No   Do you have a long-term health problem with heart, lung, kidney, or metabolic disease (e.g., diabetes), asthma, a blood disorder, no spleen, complement component deficiency, a cochlear implant, or a spinal fluid leak?  Are you on long-term aspirin therapy?   No   Do you have cancer, leukemia, HIV/AIDS, or any other immune system problem?   No   Do you have a parent, brother, or sister with an immune system problem?   No   In the past 3 months, have you taken medications that affect  your immune system, such as prednisone, other steroids, or anticancer drugs; drugs for the treatment of rheumatoid arthritis, Crohn s disease, or psoriasis; or have you had radiation treatments?   Yes   Have you had a seizure, or a brain or other nervous system problem?   No   During the past year, have you received a transfusion of blood or blood    products, or been given immune (gamma) globulin or antiviral drug?   No   For women: Are you pregnant or is there a chance you could become       pregnant during the next month?   No   Have you received any vaccinations in the past 4 weeks?   No     Immunization questionnaire was positive for at least one answer.  Notified Shoaib Ghosh.      Patient instructed to remain in clinic for 15 minutes afterwards, and to report any adverse reactions.     Screening performed by Renita Du CMA on 3/13/2024 at 9:32 AM.

## 2024-03-13 NOTE — PROGRESS NOTES
SUBJECTIVE: Brittany Chan , a 69 year old  adult, presents for counseling and information regarding upcoming travel to [place], [place], [place] and [place]. Special medical concerns include: none. She anticipates the following unusual exposures: none.    Itinerary:  vacation    Departure Date: 4/1/2024 Return date: 5/1/2024    Reason for travel (i.e. Business, pleasure): pleasure    Visiting an urban or rural area?: urban    Accommodations (i.e. hotel, hostel, friends, family, etc): hotel and family    Women - First day of your last period: NA    IMMUNIZATION HISTORY  Have you received any vaccinations in the past 4 weeks?  No  Have you ever fainted from having your blood drawn or from an injection?  No  Have you ever had a fever reaction to vaccination?  No  Have you ever had any bad reaction or side effect from any vaccination?  Yes  Have you ever had hepatitis A or B vaccine?  Yes  Do you live (or work closely) with anyone who has AIDS, an AIDS-like condition, any other immune disorder or who is on chemotherapy for cancer?  No  Have you received any injection of immune globulin or any blood products during the past 12 months?  No    GENERAL MEDICAL HISTORY  Do you have a medical condition that warrants maintenance medication or physician follow-up?  Yes  Do you have a medical condition that is stable now, but that may recur while traveling?  No  Has your spleen been removed?  No  Have you had an acute illness or a fever in the past 48 hours?  No  Are you pregnant, or might you become pregnant on this trip?  Any chance of pregnancy?  No  Are you breastfeeding?  No  Do you have HIV, AIDS, an AIDS-like condition, any other immune disorder, leukemia or cancer?  No  Do you have a severe combined immunodeficiency disease?  No  Have you had your thymus gland removed or history of problems with your thymus, such as myasthenia gravis, DiGeorge syndrome, or thymoma?  No    Do you have severe thrombocytopenia (low platelet  count) or a coagulation disorder?  No  Have you ever had a convulsion, seizure, epilepsy, neurologic condition or brain infection?  No  Do you have any stomach conditions?  No  Do you have a G6PD deficiency?  No  Do you have severe renal or kidney impairment?  No  Do you have a history of psychiatric problems?  No  Do you have a problem with strange dreams and/or nightmares?  No  Do you have insomnia?  No  Do you have problems with vaginitis?  No  Do you have psoriasis?  Yes  Are you prone to motion sickness?  No  Have you ever had headaches, nausea, vomiting, or breathing problems from altitude exposure?  No      Past Medical History:   Diagnosis Date    Abnormal Pap smear, can't excl hi gd sq intraepithelial lesion (ASC-H) 09/29/2011    Arthritis     Cancer (H)     Complication of anesthesia     Gets tachycardic from epi in Dental numbing injections    GERD (gastroesophageal reflux disease)     High cholesterol     High cholesterol     Incontinence of urine in female     Pelvic floor relaxation       Immunization History   Administered Date(s) Administered    COVID-19 MONOVALENT 12+ (Pfizer) 12/23/2020, 01/11/2021, 10/10/2021    COVID-19 Monovalent 18+ (Moderna) 06/19/2022    Flu, Unspecified 10/03/2020    Influenza (IIV3) PF 10/30/2005, 10/08/2013, 09/30/2014    Influenza Vaccine 65+ (Fluzone HD) 10/23/2022, 10/10/2023    Influenza Vaccine, 6+MO IM (QUADRIVALENT W/PRESERVATIVES) 10/01/2016, 10/01/2019, 10/03/2021    MMR 06/02/2017    Pneumococcal 20 valent Conjugate (Prevnar 20) 10/10/2023    Poliovirus, inactivated (IPV) 08/23/2022    TD,PF 7+ (Tenivac) 08/04/2003    TDAP Vaccine (Adacel) 10/29/2013    Zoster recombinant adjuvanted (SHINGRIX) 09/26/2019, 12/21/2019       Current Outpatient Medications   Medication Sig Dispense Refill    Cholecalciferol (VITAMIN D) 1000 UNIT capsule Take 2 capsules by mouth daily.       COLLAGEN PO       conjugated estrogens (PREMARIN) 0.625 MG/GM vaginal cream Place 0.5 g  vaginally twice a week 30 g 11    estradiol (VAGIFEM) 10 MCG TABS vaginal tablet INSERT 1 TABLET(10 MCG) VAGINALLY 2 TIMES A WEEK 25 tablet 0    famotidine (PEPCID) 20 MG tablet Take 20 mg by mouth 2 times daily      fish oil-omega-3 fatty acids 1000 MG capsule Take 1 g by mouth 2 times daily      Flaxseed (Linseed) (FLAX SEED OIL) 1000 MG capsule Take 1 capsule by mouth daily      hydrochlorothiazide (MICROZIDE) 12.5 MG capsule TAKE 1 CAPSULE(12.5 MG) BY MOUTH DAILY 90 capsule 0    Loratadine (CLARITIN PO) Take 10 mg by mouth daily as needed       melatonin 3 MG tablet Take 3 mg by mouth nightly as needed for sleep      metFORMIN (GLUCOPHAGE XR) 500 MG 24 hr tablet TAKE 1 TABLET(500 MG) BY MOUTH DAILY WITH DINNER 90 tablet 3    mometasone (ELOCON) 0.1 % external cream       multivitamin w/minerals (THERA-VIT-M) tablet Take 1 tablet by mouth daily      mupirocin (BACTROBAN) 2 % external ointment Apply topically 3 times daily 15 g 1    omeprazole (PRILOSEC) 20 MG DR capsule Take 1 capsule (20 mg) by mouth 2 times daily 180 capsule 0    Probiotic Product (PROBIOTIC PO) Take by mouth daily       simvastatin (ZOCOR) 20 MG tablet Take 1 tablet (20 mg) by mouth At Bedtime 90 tablet 3    simvastatin (ZOCOR) 20 MG tablet TAKE 1 TABLET(20 MG) BY MOUTH AT BEDTIME 90 tablet 1    tacrolimus (PROTOPIC) 0.1 % external ointment APPLY THIN LAYER TOPICALLY TO THE AFFECTED AREA TWICE DAILY UNTIL GONE      triamcinolone (KENALOG) 0.1 % external ointment Apply topically 2 times daily 30 g 3     Allergies   Allergen Reactions    Lisinopril Cough    Thimerosal      preservative in contact solution -        EXAM: deferred    Immunizations discussed include: Tetanus/Diphtheria  Malaria prophylaxis recommended: not needed  Symptomatic treatment for traveler's diarrhea: bismuth subsalicylate, loperamide/diphenoxylate, and azithromycin    ASSESSMENT/PLAN:    (Z71.84) Encounter for counseling for travel  (primary encounter  diagnosis)    Comment: Tdap vaccines today. Patient will return or follow-up with PCP as needed. Prophylaxis given for Traveler's diarrhea and is not needed Malaria. All questions were answered.     Plan: azithromycin (ZITHROMAX) 500 MG tablet            (Z23) Need for diphtheria-tetanus-pertussis (Tdap) vaccine  Comment:   Plan: TDAP 7+ (ADACEL,BOOSTRIX)              I have reviewed general recommendations for safe travel   including: food/water precautions, insect avoidance, safe sex   practices given high prevalence of HIV and other STDs,   roadway safety. Educational materials and links to the [facility]   Traveler's health website have been provided.    Total time 14 minutes, greater than 50 percent in counseling   and coordination of care.

## 2024-03-13 NOTE — PATIENT INSTRUCTIONS
"See travel packet provided  Recommend ultrathon (mosquito repellant), pepto bismol and imodium  The food and drink choices you make while traveling can impact your likelihood of getting sick.   If you aren't sure if a food or drink is safe, the saying \" BOIL IT, COOK IT, PEEL IT, OR FORGET IT\" can help you decide whether it's okay to consume.   Also bring hand  and sun screen with you.  Safe Travels     If you first start to get mild to moderate diarrhea, take imodium.      If diarrhea is severe or you have a fever with the diarrhea, take the antibiotic (azithromycin).        "

## 2024-04-04 ENCOUNTER — DOCUMENTATION ONLY (OUTPATIENT)
Dept: FAMILY MEDICINE | Facility: CLINIC | Age: 69
End: 2024-04-04
Payer: MEDICARE

## 2024-04-04 NOTE — PROGRESS NOTES
"Patient has lab only appt 5/3/24 for \"annual wellness visit\", no orders in chart.  Please place FUTURE orders in Epic if appropriate.    Thanks,   lab    "

## 2024-04-08 NOTE — PROGRESS NOTES
Brief chart review.    Please let patient know that I dedicate time to review all of my patient's charts in depth and determine what labs are necessary the morning of our scheduled visit. We will then obtain all necessary labs directly prior or after the scheduled office visit. This way, we can also make changes/additions deemed necessary depending on current symptoms of patient. Please let her know that lab only appt is not needed    Thanks,    Leonel Costa MD  [facility]  4/8/2024

## 2024-05-04 DIAGNOSIS — E78.5 HYPERLIPIDEMIA WITH TARGET LDL LESS THAN 130: ICD-10-CM

## 2024-05-04 DIAGNOSIS — E28.2 PCOS (POLYCYSTIC OVARIAN SYNDROME): ICD-10-CM

## 2024-05-04 DIAGNOSIS — I10 ESSENTIAL HYPERTENSION: ICD-10-CM

## 2024-05-04 SDOH — HEALTH STABILITY: PHYSICAL HEALTH: ON AVERAGE, HOW MANY DAYS PER WEEK DO YOU ENGAGE IN MODERATE TO STRENUOUS EXERCISE (LIKE A BRISK WALK)?: 3 DAYS

## 2024-05-04 SDOH — HEALTH STABILITY: PHYSICAL HEALTH: ON AVERAGE, HOW MANY MINUTES DO YOU ENGAGE IN EXERCISE AT THIS LEVEL?: 40 MIN

## 2024-05-04 ASSESSMENT — SOCIAL DETERMINANTS OF HEALTH (SDOH): HOW OFTEN DO YOU GET TOGETHER WITH FRIENDS OR RELATIVES?: ONCE A WEEK

## 2024-05-06 RX ORDER — SIMVASTATIN 20 MG
20 TABLET ORAL AT BEDTIME
Qty: 90 TABLET | Refills: 0 | Status: SHIPPED | OUTPATIENT
Start: 2024-05-06 | End: 2024-08-02

## 2024-05-06 RX ORDER — METFORMIN HCL 500 MG
TABLET, EXTENDED RELEASE 24 HR ORAL
Qty: 90 TABLET | Refills: 0 | Status: SHIPPED | OUTPATIENT
Start: 2024-05-06 | End: 2024-08-02

## 2024-05-06 RX ORDER — HYDROCHLOROTHIAZIDE 12.5 MG/1
CAPSULE ORAL
Qty: 90 CAPSULE | Refills: 0 | Status: SHIPPED | OUTPATIENT
Start: 2024-05-06 | End: 2024-08-06

## 2024-05-10 ENCOUNTER — OFFICE VISIT (OUTPATIENT)
Dept: FAMILY MEDICINE | Facility: CLINIC | Age: 69
End: 2024-05-10
Payer: MEDICARE

## 2024-05-10 ENCOUNTER — TELEPHONE (OUTPATIENT)
Dept: OTOLARYNGOLOGY | Facility: CLINIC | Age: 69
End: 2024-05-10

## 2024-05-10 VITALS
HEART RATE: 69 BPM | RESPIRATION RATE: 13 BRPM | OXYGEN SATURATION: 97 % | BODY MASS INDEX: 26.33 KG/M2 | WEIGHT: 158 LBS | DIASTOLIC BLOOD PRESSURE: 79 MMHG | HEIGHT: 65 IN | SYSTOLIC BLOOD PRESSURE: 124 MMHG | TEMPERATURE: 97.8 F

## 2024-05-10 DIAGNOSIS — M85.851 OSTEOPENIA OF NECKS OF BOTH FEMURS: ICD-10-CM

## 2024-05-10 DIAGNOSIS — E28.2 PCOS (POLYCYSTIC OVARIAN SYNDROME): ICD-10-CM

## 2024-05-10 DIAGNOSIS — H93.A1 PULSATILE TINNITUS OF RIGHT EAR: ICD-10-CM

## 2024-05-10 DIAGNOSIS — I10 ESSENTIAL HYPERTENSION: ICD-10-CM

## 2024-05-10 DIAGNOSIS — L30.9 ECZEMA, UNSPECIFIED TYPE: ICD-10-CM

## 2024-05-10 DIAGNOSIS — H57.02 ANISOCORIA: ICD-10-CM

## 2024-05-10 DIAGNOSIS — E78.5 HYPERLIPIDEMIA WITH TARGET LDL LESS THAN 130: ICD-10-CM

## 2024-05-10 DIAGNOSIS — M85.852 OSTEOPENIA OF NECKS OF BOTH FEMURS: ICD-10-CM

## 2024-05-10 DIAGNOSIS — Z00.00 ENCOUNTER FOR MEDICARE ANNUAL WELLNESS EXAM: Primary | ICD-10-CM

## 2024-05-10 DIAGNOSIS — K21.9 GASTROESOPHAGEAL REFLUX DISEASE WITHOUT ESOPHAGITIS: ICD-10-CM

## 2024-05-10 DIAGNOSIS — R73.03 PREDIABETES: ICD-10-CM

## 2024-05-10 LAB — HBA1C MFR BLD: 5.2 % (ref 0–5.6)

## 2024-05-10 PROCEDURE — 82607 VITAMIN B-12: CPT | Performed by: STUDENT IN AN ORGANIZED HEALTH CARE EDUCATION/TRAINING PROGRAM

## 2024-05-10 PROCEDURE — G0439 PPPS, SUBSEQ VISIT: HCPCS | Performed by: STUDENT IN AN ORGANIZED HEALTH CARE EDUCATION/TRAINING PROGRAM

## 2024-05-10 PROCEDURE — 99215 OFFICE O/P EST HI 40 MIN: CPT | Mod: 25 | Performed by: STUDENT IN AN ORGANIZED HEALTH CARE EDUCATION/TRAINING PROGRAM

## 2024-05-10 PROCEDURE — 83036 HEMOGLOBIN GLYCOSYLATED A1C: CPT | Performed by: STUDENT IN AN ORGANIZED HEALTH CARE EDUCATION/TRAINING PROGRAM

## 2024-05-10 PROCEDURE — 80048 BASIC METABOLIC PNL TOTAL CA: CPT | Performed by: STUDENT IN AN ORGANIZED HEALTH CARE EDUCATION/TRAINING PROGRAM

## 2024-05-10 PROCEDURE — 80061 LIPID PANEL: CPT | Performed by: STUDENT IN AN ORGANIZED HEALTH CARE EDUCATION/TRAINING PROGRAM

## 2024-05-10 PROCEDURE — 36415 COLL VENOUS BLD VENIPUNCTURE: CPT | Performed by: STUDENT IN AN ORGANIZED HEALTH CARE EDUCATION/TRAINING PROGRAM

## 2024-05-10 RX ORDER — RESPIRATORY SYNCYTIAL VIRUS VACCINE 120MCG/0.5
0.5 KIT INTRAMUSCULAR ONCE
Qty: 1 EACH | Refills: 0 | Status: CANCELLED | OUTPATIENT
Start: 2024-05-10 | End: 2024-05-10

## 2024-05-10 ASSESSMENT — PAIN SCALES - GENERAL: PAINLEVEL: NO PAIN (0)

## 2024-05-10 NOTE — TELEPHONE ENCOUNTER
Prema Arrieta, RN  You15 minutes ago (4:00 PM)     MW  Please cancel the appointment for 9/24. Pt needs to be seen next week per Dr. Castaneda.  Thanks!       Appointments cancelled.    Angelica SAVAGE, Specialty RN 5/10/2024 4:16 PM

## 2024-05-10 NOTE — TELEPHONE ENCOUNTER
[facility] Call Center    Phone Message    May a detailed message be left on voicemail: yes     Reason for Call: Other: Pt is scheduled for Urgent 3-5 day ref for Pulsatile Tinnitus and GERD. Scheduled for soonest available in [place]. PT willing to go to any location. Please call if able to be seen sooner. Thanks.     Action Taken: Other: ENT    Travel Screening: Not Applicable

## 2024-05-10 NOTE — TELEPHONE ENCOUNTER
Called [facility] per Dr. Castaneda's request. Spoke with Mary.     Appt scheduled for Monday May 13th at 3 pm with Audiology and pt meets with ENT provider Dr. Coe at 4 pm.   Location: [place].     [facility]  [address] [place] [address]    Call [phone] if insurance is not in network.       Called pt and provided the above info.   Pt verbalized verbalized understanding of plan, and is agreeable.         Routing to PCP to place a referral.     Prema Arrieta RN

## 2024-05-10 NOTE — PROGRESS NOTES
rePreventive Care Visit  [facility] BUCK Costa MD, [facility]  May 10, 2024    Assessment & Plan     Encounter for Medicare annual wellness exam  UTD on mammogram and CRC. UTD on DEXA, see below. Discussed vaccination recommendations.     Prediabetes  PCOS (polycystic ovarian syndrome)  Treating with 500mg metformin daily. Plan to repeat BMP, A1c today.   - Hemoglobin A1c  - OFFICE/OUTPT VISIT,EST,LEVL V    Pulsatile tinnitus of right ear  Acute onset of R pulsatile tinnitus while bike riding two days ago. Now continuous, noted at rest and with quiet room. Otherwise completely asymptomatic. No other symptoms/signs of stroke or vascular dissection. Anisocoria likely 2/2 recent vitrectomy surgery and confirmed not new in the past 2 days with recent home photos. Plan for urgent ENT referral to help rule out vascular etiologies.   - Adult ENT  Referral  - OFFICE/OUTPT VISIT,EST,LEVL V    Anisocoria  L pupil larger in setting of recent eye surgery. Noted for future reference.   - OFFICE/OUTPT VISIT,EST,LEVL V    Gastroesophageal reflux disease without esophagitis  Hx of uncontrolled GERD on daily PPI. EGD in 2022 with hiatal hernia. Describes intermittent 4x a year sensation of chest pain with cardiac causes ruled out (CT CAC of zero and stress echo negative in 2022). Also wakes up with vomitus in mouth at times. Did see GI in 2022 however with new/ongoing symptoms and concerns of esophageal spasm, plan for her to touch base once again.  - Adult GI  Referral - Consult Only  - Vitamin B12  - OFFICE/OUTPT VISIT,EST,LEVL V    Eczema, unspecified type  Treating with med-potency topical steroid for flares over elbows. Understands to use sparingly, only as directed.   - OFFICE/OUTPT VISIT,EST,LEVL V    Essential hypertension  Well controlled on 12.5mg hydrochlorothiazide. Ok to refill for 1 yr if BMP stable.   - Basic metabolic panel  (Ca, Cl, CO2, Creat, Gluc, K, Na, BUN)  -  "OFFICE/OUTPT VISIT,EST,LEVL V    Hyperlipidemia with target LDL less than 130  Due for repeat lipid. On 20mg simvastatin. Ok to refill for 1 yr.  - Lipid panel reflex to direct LDL Fasting  - OFFICE/OUTPT VISIT,EST,LEVL V    Osteopenia of necks of both femurs  Last DEXA on 6/2023 revealing osteopenia. Discussed lifestyle recommendations. Repeat DEXA in 5 years.     BMI  Estimated body mass index is 26.29 kg/m  as calculated from the following:    Height as of this encounter: 1.651 m (5' 5\").    Weight as of this encounter: 71.7 kg (158 lb).     In addition to the preventive service, I spent 50 minutes discussing the patient's chronic conditions and ongoing management/treatment.    Counseling  Appropriate preventive services were discussed with this patient.  Checklist reviewing preventive services available has been given to the patient.    Follow up in one year, earlier as needed    Leonel Costa MD  [facility], [place]  5/12/2024    Paresh Soto is a 69 year old, presenting for the following:  Medicare Visit        5/10/2024     9:38 AM   Additional Questions   Roomed by Kacy Rosales      Health Care Directive  Patient has a Health Care Directive on file  Advance care planning document is on file and is current.    HPI    Pulsatile tinnitus  First noticed sensation of heart beat in her right ear when she was riding her bike two days ago (Wednesday)  Since then, can hear whooshing, \"murmur\" in her R ear at rest.   Doesn't hear this when it is noisy but if quiet enough surroundings, this returns.  Doesn't know if it is louder with activity as she hasn't been active since the bike ride.   Constant since Wednesday. Felt her pulse and confirmed it is her heart beat in her ear.  No ear pain. No hearing loss. No other symptoms at all. Feels fine otherwise.  No vision changes, numbness/tingling/weakness in extremities. No gait/balance issues. No dizziness.  Has never felt this sensation " before.    GERD  Taking omeprazole BID and on occasion will use famotidine if eating late.  Wondering if having esophageal spasms.  Sometimes has excruciating chest pain, is intermittent and infrequent.   Has had this occur 3-4x a year or so.  Has been worked up for possible cardiac causes which were all normal.  Sometimes wakes up with vomit in mouth.  Her friend had diagnosis of esophageal spasm and she wonders if this is a possibility/source of her pain    Elbow rash  Has had elbow rash occasionally. Flaking/scaling on both elbows.  Not sure if eczema or psioriasis.   Has been prescribed mometasone BID x2 weeks for flares in the past which is really helpful.  No other rashes on other parts of body.    HLD  Simvastatin 20mg daily.  No issues.    HTN  Taking hydrochlorothiazide daily.  No issues.        5/4/2024   General Health   How would you rate your overall physical health? Excellent   Feel stress (tense, anxious, or unable to sleep) Not at all         5/4/2024   Nutrition   Diet: Regular (no restrictions)         5/4/2024   Exercise   Days per week of moderate/strenous exercise 3 days   Average minutes spent exercising at this level 40 min         5/4/2024   Social Factors   Frequency of gathering with friends or relatives Once a week   Worry food won't last until get money to buy more No   Food not last or not have enough money for food? No   Do you have housing?  Yes   Are you worried about losing your housing? No   Lack of transportation? No   Unable to get utilities (heat,electricity)? No         5/9/2024   Fall Risk   Fallen 2 or more times in the past year? No   Trouble with walking or balance? No          5/4/2024   Activities of Daily Living- Home Safety   Needs help with the following daily activites None of the above   Safety concerns in the home None of the above         5/4/2024   Dental   Dentist two times every year? Yes         5/4/2024   Hearing Screening   Hearing concerns? None of the above          2024   Driving Risk Screening   Patient/family members have concerns about driving No         2024   General Alertness/Fatigue Screening   Have you been more tired than usual lately? No         2024   Urinary Incontinence Screening   Bothered by leaking urine in past 6 months Yes         2024   TB Screening   Were you born outside of the [place]? No     Today's PHQ-2 Score:       2024    10:19 AM   PHQ-2 (  [employer])   Q1: Little interest or pleasure in doing things 0   Q2: Feeling down, depressed or hopeless 0   PHQ-2 Score 0   Q1: Little interest or pleasure in doing things Not at all   Q2: Feeling down, depressed or hopeless Not at all   PHQ-2 Score 0         2024   Substance Use   Alcohol more than 3/day or more than 7/wk No   Do you have a current opioid prescription? No   How severe/bad is pain from 1 to 10? /10   Do you use any other substances recreationally? No     Social History     Tobacco Use    Smoking status: Former     Current packs/day: 0.00     Types: Cigarettes     Quit date: 1980     Years since quittin.3    Smokeless tobacco: Never   Vaping Use    Vaping status: Never Used   Substance Use Topics    Alcohol use: Yes     Comment: 5 a week    Drug use: No         2023   LAST FHS-7 RESULTS   1st degree relative breast or ovarian cancer No   Any relative bilateral breast cancer No   Any male have breast cancer No   Any ONE woman have BOTH breast AND ovarian cancer No   Any woman with breast cancer before 50yrs No   2 or more relatives with breast AND/OR ovarian cancer Yes   2 or more relatives with breast AND/OR bowel cancer No     Mammogram Screening - Mammogram every 1-2 years updated in Health Maintenance based on mutual decision making    ASCVD Risk   The 10-year ASCVD risk score (Merna TRIPLETT, et al., 2019) is: 7.4%    Values used to calculate the score:      Age: 69 years      Sex: Female      Is Non- : No      Diabetic:  "No      Tobacco smoker: No      Systolic Blood Pressure: 124 mmHg      Is BP treated: Yes      HDL Cholesterol: 99 mg/dL      Total Cholesterol: 218 mg/dL    Reviewed and updated as needed this visit by Provider   Tobacco     Med Hx  Surg Hx  Fam Hx          Current providers sharing in care for this patient include:  Patient Care Team:  Leonel Costa MD as PCP - General (Family Practice)  Aditya Agrawal MD as MD (Gastroenterology)  Leonel Costa MD as Assigned PCP  Angelica Iniguez AuD as Audiologist (Audiology)  Abimael Farias MD as MD (Otolaryngology)    The following health maintenance items are reviewed in Epic and correct as of today:  Health Maintenance   Topic Date Due    HEPATITIS B IMMUNIZATION (1 of 3 - Risk 3-dose series) Never done    RSV VACCINE (Pregnancy & 60+) (1 - 1-dose 60+ series) Never done    COVID-19 Vaccine (5 - 2023-24 season) 09/01/2023    ANNUAL REVIEW OF HM ORDERS  11/17/2023    MEDICARE ANNUAL WELLNESS VISIT  04/14/2024    LIPID  05/10/2025    FALL RISK ASSESSMENT  05/10/2025    MAMMO SCREENING  11/08/2025    GLUCOSE  05/10/2027    ADVANCE CARE PLANNING  05/10/2029    COLORECTAL CANCER SCREENING  09/26/2029    DTAP/TDAP/TD IMMUNIZATION (3 - Td or Tdap) 03/13/2034    DEXA  06/01/2038    HEPATITIS C SCREENING  Completed    MIGRAINE ACTION PLAN  Completed    PHQ-2 (once per calendar year)  Completed    INFLUENZA VACCINE  Completed    Pneumococcal Vaccine: 65+ Years  Completed    ZOSTER IMMUNIZATION  Completed    HPV IMMUNIZATION  Aged Out    MENINGITIS IMMUNIZATION  Aged Out    RSV MONOCLONAL ANTIBODY  Aged Out    PAP  Discontinued    IPV IMMUNIZATION  Discontinued    HEPATITIS A IMMUNIZATION  Discontinued        Objective    Exam  /79 (BP Location: Right arm, Patient Position: Sitting, Cuff Size: Adult Large)   Pulse 69   Temp 97.8  F (36.6  C) (Oral)   Resp 13   Ht 1.651 m (5' 5\")   Wt 71.7 kg (158 lb)   LMP 10/05/2012   SpO2 97%   BMI 26.29 kg/m     Estimated " "body mass index is 26.29 kg/m  as calculated from the following:    Height as of this encounter: 1.651 m (5' 5\").    Weight as of this encounter: 71.7 kg (158 lb).    Physical Exam  GENERAL: healthy, alert and no distress  HEAD: Normocephalic, atraumatic.   EYES: Anisocoria. R pupil 4mm; L pupil 5mm. Reactive to light bilaterally. Normal conjunctivae, sclera. No nystagmus.   ENT: Normal EAC and TMs bilaterally. No TTP over R temporal artery. Normal oropharynx.   NECK: Supple. No lymphadenopathy appreciated. Trachea midline. Thyroid not enlarged, not TTP.  RESP: lungs clear to auscultation - no rales, rhonchi or wheezes  CV: regular rate and rhythm, normal S1 S2, no murmur, click, rub or gallop.  No carotid bruits bilaterally. No peripheral swelling noted.   ABDOMEN: soft, no TTP x4 quadrants. No hepatomegaly or masses appreciated. BS normactive.  MSK: no gross musculoskeletal defects noted.  SKIN: no suspicious lesions or rashes.  EXT: Warm and well perfused.   NEURO: CNII-XII intact bilaterally except for ?CNIII w/ isolated anisocoria without ptosis (see EYES above). No change compared to photos from [place] trip last month. Negative pronator drift. 5/5 muscle strength in extremities throughout. Sensation to light touch intact in extremities throughout. Finger to nose intact bilaterally. Brachioradialis, patellar, achilles reflexes 2+ and symmetric. No clonus. Downgoing toes with babinski. Romberg test negative. No focal deficits.  PSYCH: Groomed, dressed appropriately for weather. Normal mood with consistent affect.            5/10/2024   Mini Cog   Clock Draw Score 2 Normal   3 Item Recall 3 objects recalled   Mini Cog Total Score 5     Signed Electronically by: Leonel Costa MD  "

## 2024-05-11 LAB
ANION GAP SERPL CALCULATED.3IONS-SCNC: 9 MMOL/L (ref 7–15)
BUN SERPL-MCNC: 16.5 MG/DL (ref 8–23)
CALCIUM SERPL-MCNC: 9.5 MG/DL (ref 8.8–10.2)
CHLORIDE SERPL-SCNC: 100 MMOL/L (ref 98–107)
CHOLEST SERPL-MCNC: 218 MG/DL
CREAT SERPL-MCNC: 0.67 MG/DL (ref 0.51–1.17)
DEPRECATED HCO3 PLAS-SCNC: 28 MMOL/L (ref 22–29)
EGFRCR SERPLBLD CKD-EPI 2021: >90 ML/MIN/1.73M2
FASTING STATUS PATIENT QL REPORTED: YES
FASTING STATUS PATIENT QL REPORTED: YES
GLUCOSE SERPL-MCNC: 89 MG/DL (ref 70–99)
HDLC SERPL-MCNC: 99 MG/DL
LDLC SERPL CALC-MCNC: 106 MG/DL
NONHDLC SERPL-MCNC: 119 MG/DL
POTASSIUM SERPL-SCNC: 4.2 MMOL/L (ref 3.4–5.3)
SODIUM SERPL-SCNC: 137 MMOL/L (ref 135–145)
TRIGL SERPL-MCNC: 67 MG/DL
VIT B12 SERPL-MCNC: 628 PG/ML (ref 232–1245)

## 2024-05-12 PROBLEM — L30.9 ECZEMA, UNSPECIFIED TYPE: Status: ACTIVE | Noted: 2021-07-28

## 2024-05-12 RX ORDER — MOMETASONE FUROATE 1 MG/G
CREAM TOPICAL DAILY
Qty: 15 G | Refills: 1 | Status: SHIPPED | OUTPATIENT
Start: 2024-05-12 | End: 2024-05-26

## 2024-05-12 NOTE — PATIENT INSTRUCTIONS
"Preventive Care Advice   This is general advice we often give to help people stay healthy. Your care team may have specific advice just for you. Please talk to your care team about your own preventive care needs.  Lifestyle  Exercise at least 150 minutes each week (30 minutes a day, 5 days a week).  Do muscle strengthening activities 2 days a week. These help control your weight and prevent disease.  No smoking.  Wear sunscreen to prevent skin cancer.  Have your home tested for radon every 2 to 5 years. Radon is a colorless, odorless gas that can harm your lungs. To learn more, go to www.health.[facility].[place]. and search for \"Radon in Homes.\"  Keep guns unloaded and locked up in a safe place like a safe or gun vault, or, use a gun lock and hide the keys. Always lock away bullets separately. To learn more, visit [place].gov and search for \"safe gun storage.\"  Nutrition  Eat 5 or more servings of fruits and vegetables each day.  Try wheat bread, brown rice and whole grain pasta (instead of white bread, rice, and pasta).  Get enough calcium and vitamin D. Check the label on foods and aim for 100% of the RDA (recommended daily allowance).  Regular exams  Have a dental exam and cleaning every 6 months.  See your health care team every year to talk about:  Any changes in your health.  Any medicines your care team has prescribed.  Preventive care, family planning, and ways to prevent chronic diseases.  Shots (vaccines)   HPV shots (up to age 26), if you've never had them before.  Hepatitis B shots (up to age 59), if you've never had them before.  COVID-19 shot: Get this shot when it's due.  Flu shot: Get a flu shot every year.  Tetanus shot: Get a tetanus shot every 10 years.  Pneumococcal, hepatitis A, and RSV shots: Ask your care team if you need these based on your risk.  Shingles shot (for age 50 and up).  General health tests  Diabetes screening:  Starting at age 35, Get screened for diabetes at least every 3 years.  If " you are younger than age 35, ask your care team if you should be screened for diabetes.  Cholesterol test: At age 39, start having a cholesterol test every 5 years, or more often if advised.  Bone density scan (DEXA): At age 50, ask your care team if you should have this scan for osteoporosis (brittle bones).  Hepatitis C: Get tested at least once in your life.  Abdominal aortic aneurysm screening: Talk to your doctor about having this screening if you:  Have ever smoked; and  Are biologically male; and  Are between the ages of 65 and 75.  STIs (sexually transmitted infections)  Before age 24: Ask your care team if you should be screened for STIs.  After age 24: Get screened for STIs if you're at risk. You are at risk for STIs (including HIV) if:  You are sexually active with more than one person.  You don't use condoms every time.  You or a partner was diagnosed with a sexually transmitted infection.  If you are at risk for HIV, ask about PrEP medicine to prevent HIV.  Get tested for HIV at least once in your life, whether you are at risk for HIV or not.  Cancer screening tests  Cervical cancer screening: If you have a cervix, begin getting regular cervical cancer screening tests at age 21. Most people who have regular screenings with normal results can stop after age 65. Talk about this with your provider.  Breast cancer scan (mammogram): If you've ever had breasts, begin having regular mammograms starting at age 40. This is a scan to check for breast cancer.  Colon cancer screening: It is important to start screening for colon cancer at age 45.  Have a colonoscopy test every 10 years (or more often if you're at risk) Or, ask your provider about stool tests like a FIT test every year or Cologuard test every 3 years.  To learn more about your testing options, visit: www.Care Team Connect/852746.pdf.  For help making a decision, visit: ruthie/lq42524.  Prostate cancer screening test: If you have a prostate and are age 55  to 69, ask your provider if you would benefit from a yearly prostate cancer screening test.  Lung cancer screening: If you are a current or former smoker age 50 to 80, ask your care team if ongoing lung cancer screenings are right for you.  For informational purposes only. Not to replace the advice of your health care provider. Copyright   2023 [employer]. All rights reserved. Clinically reviewed by the [facility] Transitions Program. [employer] [id number] - REV 04/24.

## 2024-05-13 ENCOUNTER — TRANSFERRED RECORDS (OUTPATIENT)
Dept: HEALTH INFORMATION MANAGEMENT | Facility: CLINIC | Age: 69
End: 2024-05-13
Payer: MEDICARE

## 2024-06-14 DIAGNOSIS — K21.9 GASTROESOPHAGEAL REFLUX DISEASE WITHOUT ESOPHAGITIS: ICD-10-CM

## 2024-08-01 ENCOUNTER — TRANSFERRED RECORDS (OUTPATIENT)
Dept: HEALTH INFORMATION MANAGEMENT | Facility: CLINIC | Age: 69
End: 2024-08-01
Payer: MEDICARE

## 2024-08-02 DIAGNOSIS — E28.2 PCOS (POLYCYSTIC OVARIAN SYNDROME): ICD-10-CM

## 2024-08-02 DIAGNOSIS — E78.5 HYPERLIPIDEMIA WITH TARGET LDL LESS THAN 130: ICD-10-CM

## 2024-08-02 RX ORDER — SIMVASTATIN 20 MG
20 TABLET ORAL AT BEDTIME
Qty: 90 TABLET | Refills: 0 | Status: SHIPPED | OUTPATIENT
Start: 2024-08-02

## 2024-08-02 RX ORDER — METFORMIN HCL 500 MG
TABLET, EXTENDED RELEASE 24 HR ORAL
Qty: 90 TABLET | Refills: 0 | Status: SHIPPED | OUTPATIENT
Start: 2024-08-02 | End: 2024-10-04

## 2024-08-06 DIAGNOSIS — I10 ESSENTIAL HYPERTENSION: ICD-10-CM

## 2024-08-06 RX ORDER — HYDROCHLOROTHIAZIDE 12.5 MG/1
CAPSULE ORAL
Qty: 90 CAPSULE | Refills: 0 | Status: SHIPPED | OUTPATIENT
Start: 2024-08-06

## 2024-08-12 ENCOUNTER — LAB (OUTPATIENT)
Dept: LAB | Facility: CLINIC | Age: 69
End: 2024-08-12
Payer: MEDICARE

## 2024-08-12 DIAGNOSIS — R73.03 PREDIABETES: ICD-10-CM

## 2024-08-12 LAB — HBA1C MFR BLD: 5.5 % (ref 0–5.6)

## 2024-08-12 PROCEDURE — 36415 COLL VENOUS BLD VENIPUNCTURE: CPT

## 2024-08-12 PROCEDURE — 83036 HEMOGLOBIN GLYCOSYLATED A1C: CPT

## 2024-08-19 ENCOUNTER — OFFICE VISIT (OUTPATIENT)
Dept: FAMILY MEDICINE | Facility: CLINIC | Age: 69
End: 2024-08-19
Payer: MEDICARE

## 2024-08-19 VITALS
TEMPERATURE: 98.2 F | OXYGEN SATURATION: 99 % | RESPIRATION RATE: 16 BRPM | BODY MASS INDEX: 26.82 KG/M2 | SYSTOLIC BLOOD PRESSURE: 127 MMHG | HEIGHT: 65 IN | WEIGHT: 161 LBS | DIASTOLIC BLOOD PRESSURE: 81 MMHG | HEART RATE: 77 BPM

## 2024-08-19 DIAGNOSIS — H35.373 MACULAR PUCKERING OF RETINA, BILATERAL: ICD-10-CM

## 2024-08-19 DIAGNOSIS — I10 ESSENTIAL HYPERTENSION: ICD-10-CM

## 2024-08-19 DIAGNOSIS — Z01.818 PRE-OP EXAM: Primary | ICD-10-CM

## 2024-08-19 DIAGNOSIS — E28.2 PCOS (POLYCYSTIC OVARIAN SYNDROME): ICD-10-CM

## 2024-08-19 PROCEDURE — 99213 OFFICE O/P EST LOW 20 MIN: CPT | Performed by: FAMILY MEDICINE

## 2024-08-19 RX ORDER — PREDNISOLONE ACETATE 10 MG/ML
SUSPENSION/ DROPS OPHTHALMIC
COMMUNITY
Start: 2023-10-09 | End: 2024-08-19

## 2024-08-19 RX ORDER — POLYMYXIN B SULFATE AND TRIMETHOPRIM 1; 10000 MG/ML; [USP'U]/ML
SOLUTION OPHTHALMIC
COMMUNITY
Start: 2023-10-09 | End: 2024-08-19

## 2024-08-19 RX ORDER — ESTRADIOL 0.1 MG/G
CREAM VAGINAL
COMMUNITY
Start: 2024-05-29 | End: 2024-08-19

## 2024-08-19 ASSESSMENT — ANXIETY QUESTIONNAIRES
GAD7 TOTAL SCORE: 0
8. IF YOU CHECKED OFF ANY PROBLEMS, HOW DIFFICULT HAVE THESE MADE IT FOR YOU TO DO YOUR WORK, TAKE CARE OF THINGS AT HOME, OR GET ALONG WITH OTHER PEOPLE?: NOT DIFFICULT AT ALL
1. FEELING NERVOUS, ANXIOUS, OR ON EDGE: NOT AT ALL
7. FEELING AFRAID AS IF SOMETHING AWFUL MIGHT HAPPEN: NOT AT ALL
5. BEING SO RESTLESS THAT IT IS HARD TO SIT STILL: NOT AT ALL
GAD7 TOTAL SCORE: 0
4. TROUBLE RELAXING: NOT AT ALL
6. BECOMING EASILY ANNOYED OR IRRITABLE: NOT AT ALL
IF YOU CHECKED OFF ANY PROBLEMS ON THIS QUESTIONNAIRE, HOW DIFFICULT HAVE THESE PROBLEMS MADE IT FOR YOU TO DO YOUR WORK, TAKE CARE OF THINGS AT HOME, OR GET ALONG WITH OTHER PEOPLE: NOT DIFFICULT AT ALL
7. FEELING AFRAID AS IF SOMETHING AWFUL MIGHT HAPPEN: NOT AT ALL
3. WORRYING TOO MUCH ABOUT DIFFERENT THINGS: NOT AT ALL
GAD7 TOTAL SCORE: 0
2. NOT BEING ABLE TO STOP OR CONTROL WORRYING: NOT AT ALL

## 2024-08-19 ASSESSMENT — PATIENT HEALTH QUESTIONNAIRE - PHQ9
10. IF YOU CHECKED OFF ANY PROBLEMS, HOW DIFFICULT HAVE THESE PROBLEMS MADE IT FOR YOU TO DO YOUR WORK, TAKE CARE OF THINGS AT HOME, OR GET ALONG WITH OTHER PEOPLE: NOT DIFFICULT AT ALL
SUM OF ALL RESPONSES TO PHQ QUESTIONS 1-9: 0
SUM OF ALL RESPONSES TO PHQ QUESTIONS 1-9: 0

## 2024-08-19 NOTE — PROGRESS NOTES
Preoperative Evaluation  [facility]  [address] [facility] [address]  Phone: [phone]  Primary Provider: Leonel Costa MD  Pre-op Performing Provider: Joseph Dodson MD  Aug 19, 2024             8/14/2024   Surgical Information   What procedure is being done? OD vitrectomy   Facility or Hospital where procedure/surgery will be performed: [facility]   Who is doing the procedure / surgery? Dev   Date of surgery / procedure: August 28   Time of surgery / procedure: 0645   Where do you plan to recover after surgery? at home with family        Fax number for surgical facility: [phone] [facility] [place]    Assessment & Plan     The proposed surgical procedure is considered LOW risk.    Pre-op exam    Macular puckering of retina, bilateral    Essential hypertension    PCOS (polycystic ovarian syndrome)  Patient wishes to restart metformin due to worsening A1c, with her history of PCOS I recommend she has discussion with her primary care provider for restarting this.  Overall she is low risk to start metformin.       - No identified additional risk factors other than previously addressed    Antiplatelet or Anticoagulation Medication Instructions   - Patient is on no antiplatelet or anticoagulation medications.    Additional Medication Instructions  Take all scheduled medications on the day of surgery  Hold fish oil for a week prior to procedure.    Recommendation  Approval given to proceed with proposed procedure, without further diagnostic evaluation.    Paresh Soto is a 69 year old, presenting for the following:  Pre-Op Exam (Surgery 08/28/2024)          8/19/2024     3:09 PM   Additional Questions   Roomed by Gloria MOONEY related to upcoming procedure: Presents for preoperative evaluation prior to management of macular pucker.        8/14/2024   Pre-Op Questionnaire   Have you ever had a heart attack or stroke? No   Have you ever had surgery  on your heart or blood vessels, such as a stent placement, a coronary artery bypass, or surgery on an artery in your head, neck, heart, or legs? No   Do you have chest pain with activity? No   Do you have a history of heart failure? No   Do you currently have a cold, bronchitis or symptoms of other infection? No   Do you have a cough, shortness of breath, or wheezing? No   Do you or anyone in your family have previous history of blood clots? No   Do you or does anyone in your family have a serious bleeding problem such as prolonged bleeding following surgeries or cuts? No   Have you ever had problems with anemia or been told to take iron pills? No   Have you had any abnormal blood loss such as black, tarry or bloody stools, or abnormal vaginal bleeding? No   Have you ever had a blood transfusion? No   Are you willing to have a blood transfusion if it is medically needed before, during, or after your surgery? Yes   Have you or any of your relatives ever had problems with anesthesia? No   Do you have sleep apnea, excessive snoring or daytime drowsiness? No   Do you have any artifical heart valves or other implanted medical devices like a pacemaker, defibrillator, or continuous glucose monitor? No   Do you have artificial joints? No   Are you allergic to latex? No        Health Care Directive  Patient has a Health Care Directive on file      Preoperative Review of    reviewed - no record of controlled substances prescribed.      Status of Chronic Conditions:  See problem list for active medical problems.  Problems all longstanding and stable, except as noted/documented.  See ROS for pertinent symptoms related to these conditions.    Patient Active Problem List    Diagnosis Date Noted    Osteopenia of multiple sites 06/05/2023     Priority: Medium     Based on 6/2023 DEXA scan.    FINDINGS:               Lumbar Spine (L1-L4)      T-score:  0.6, marked degenerative changes present                Left Femoral Neck             T-score:  -1.4               Right Femoral Neck          T-score:  -1.3  This patient's risks based on available information, with the use of FRAX, are 8.8 % for major osteoporotic fracture and 0.9 % for hip fracture.   Based on these guidelines, treatment (in addition to calcium and vitamin D) is not recommended for this patient, after ruling out other causes of osteoporosis.      Post-menopausal atrophic vaginitis 04/17/2023     Priority: Medium    Osteoarthritis of finger, unspecified laterality 07/28/2021     Priority: Medium    Eczema, unspecified type 07/28/2021     Priority: Medium     Mild, intermittent flaking rash over elbows.  Treats with mometasone x14 days for flares      Essential hypertension 07/28/2021     Priority: Medium    Anxiety 05/31/2016     Priority: Medium    Stress incontinence 10/12/2012     Priority: Medium    Overweight (BMI 25.0-29.9) 10/12/2012     Priority: Medium    Family history of diabetes mellitus 09/29/2011     Priority: Medium     Father, borderline, lost weight and went away      Atypical squamous cells cannot exclude high grade squamous intraepithelial lesion on cytologic smear of cervix (ASC-H) 09/29/2011     Priority: Medium     09/29/11 ASC-H: referred for colposcopy by 01/2012  10/28/11 Colposcopy done with [facility], abstracted in chart. Per patient, results were unremarkable and will be having follow up pap with them by April 2012 05/16/12 Pap= Normal (abstracted into chart)  10/12/12 Dx pap= Normal. Plan r/p 1 year- due 10/2013  10/29/13 Dx pap= ASCUS, Negative HPV. Routine screening.  01/19/15 Pap= ASCUS, Neg HPV. Co-test 1 yr per Dr. Polanco  03/18/16 Pap= NIL, Neg HPV. 3 yr co-test  8/15/19 Pap: NIL/neg HR HPV. Pap in 3 years      Hyperlipidemia with target LDL less than 130 10/31/2010     Priority: Medium     LDL >190 off statin medication in 2023  On simvastatin, 20 mg      Hemorrhoid 06/04/2009     Priority: Medium     Mild internal hemorrhoids       PCOS (polycystic ovarian syndrome) 04/28/2009     Priority: Medium     H/o PCOS & irregular periods in past.       Esophageal reflux 01/19/2007     Priority: Medium     On omeprazole BID and H2B prn for symptoms. EGD in 2022 with hiatal hernia. No red flag symptoms. Continue current regimen.       Loss of height 08/04/2003     Priority: Medium    Family history of other endocrine and metabolic diseases 08/04/2003     Priority: Medium     Problem list name updated by automated process. Provider to review and confirm      Flatulence, eructation, and gas pain 08/04/2003     Priority: Medium      Past Medical History:   Diagnosis Date    Abnormal Pap smear, can't excl hi gd sq intraepithelial lesion (ASC-H) 09/29/2011    Arthritis     Cancer (H)     Complication of anesthesia     Gets tachycardic from epi in Dental numbing injections    GERD (gastroesophageal reflux disease)     High cholesterol     High cholesterol     Incontinence of urine in female     Pelvic floor relaxation      Past Surgical History:   Procedure Laterality Date    BREAST LUMPECTOMY, RT/LT  1978    lt fibroadenoma    COLONOSCOPY      COLONOSCOPY Left 09/26/2019    Procedure: COLONOSCOPY;  Surgeon: Renita Pablo MD;  Location:  GI    DAVINCI PELVIC PROCEDURE N/A 12/14/2020    Procedure: Robotic assisted sacrocolpopexy, supracervical hysterectomy with bilateral salpingo-oophorectomy, abdominal enterocele repair, midurethral sling, cystoscopy, and  posterior repair;  Surgeon: Gail Yang MD;  Location:  OR    ESOPHAGOSCOPY, GASTROSCOPY, DUODENOSCOPY (EGD), COMBINED N/A 08/05/2016    Procedure: COMBINED ESOPHAGOSCOPY, GASTROSCOPY, DUODENOSCOPY (EGD);  Surgeon: Feroz Ramirez MD;  Location:  GI    ESOPHAGOSCOPY, GASTROSCOPY, DUODENOSCOPY (EGD), COMBINED N/A 05/18/2022    Procedure: ESOPHAGOGASTRODUODENOSCOPY, WITH BIOPSY;  Surgeon: Aditya Agrawal MD;  Location:  GI    ORTHOPEDIC SURGERY Right 2014    rotator cuff  repair    TONSILLECTOMY & ADENOIDECTOMY      VITRECTOMY ANTERIOR Left      Current Outpatient Medications   Medication Sig Dispense Refill    Cholecalciferol (VITAMIN D) 1000 UNIT capsule Take 2 capsules by mouth daily.       COLLAGEN PO       conjugated estrogens (PREMARIN) 0.625 MG/GM vaginal cream Place 0.5 g vaginally twice a week 30 g 11    famotidine (PEPCID) 20 MG tablet Take 20 mg by mouth 2 times daily      fish oil-omega-3 fatty acids 1000 MG capsule Take 1 g by mouth 2 times daily      Flaxseed (Linseed) (FLAX SEED OIL) 1000 MG capsule Take 1 capsule by mouth daily      hydrochlorothiazide (MICROZIDE) 12.5 MG capsule TAKE 1 CAPSULE(12.5 MG) BY MOUTH DAILY 90 capsule 0    melatonin 3 MG tablet Take 3 mg by mouth nightly as needed for sleep      multivitamin w/minerals (THERA-VIT-M) tablet Take 1 tablet by mouth daily      omeprazole (PRILOSEC) 20 MG DR capsule TAKE 1 CAPSULE(20 MG) BY MOUTH TWICE DAILY 180 capsule 0    Probiotic Product (PROBIOTIC PO) Take by mouth daily       simvastatin (ZOCOR) 20 MG tablet TAKE 1 TABLET(20 MG) BY MOUTH AT BEDTIME 90 tablet 0    estradiol (ESTRACE) 0.1 MG/GM vaginal cream INSERT 1 GRAM OF CREAM VAGINALLY USING APPLICATOR EVERY OTHER NIGHT (Patient not taking: Reported on 8/19/2024)      metFORMIN (GLUCOPHAGE XR) 500 MG 24 hr tablet TAKE 1 TABLET(500 MG) BY MOUTH DAILY WITH DINNER 90 tablet 0    polymixin b-trimethoprim (POLYTRIM) 94108-7.1 UNIT/ML-% ophthalmic solution INSTILL 1 DROP IN LEFT EYE FOUR TIMES DAILY AS DIRECTED. START DAY AFTER SURGERY. USE FOR 7 DAYS THEN STOP (Patient not taking: Reported on 8/19/2024)      prednisoLONE acetate (PRED FORTE) 1 % ophthalmic suspension SHAKE LIQUID AND INSTILL 1 DROP IN LEFT EYE FOUR TIMES DAILY AS DIRECTED. START DAY AFTER SURGERY (Patient not taking: Reported on 8/19/2024)         Allergies   Allergen Reactions    Lisinopril Cough    Thimerosal      preservative in contact solution -        Social History     Tobacco Use     "Smoking status: Former     Current packs/day: 0.00     Types: Cigarettes     Quit date: 1980     Years since quittin.6     Passive exposure: Past    Smokeless tobacco: Never   Substance Use Topics    Alcohol use: Yes     Comment: 5 a week       History   Drug Use No               Objective    /81 (BP Location: Right arm, Patient Position: Chair, Cuff Size: Adult Regular)   Pulse 77   Temp 98.2  F (36.8  C) (Oral)   Resp 16   Ht 1.651 m (5' 5\")   Wt 73 kg (161 lb)   LMP 10/05/2012   SpO2 99%   BMI 26.79 kg/m     Estimated body mass index is 26.79 kg/m  as calculated from the following:    Height as of this encounter: 1.651 m (5' 5\").    Weight as of this encounter: 73 kg (161 lb).  Physical Exam  GENERAL: alert and no distress  EYES: Eyes grossly normal to inspection, PERRL and conjunctivae and sclerae normal  HENT: ear canals and TM's normal, nose and mouth without ulcers or lesions  NECK: no adenopathy, no asymmetry, masses, or scars  RESP: lungs clear to auscultation - no rales, rhonchi or wheezes  CV: regular rate and rhythm, normal S1 S2, no S3 or S4, no murmur, click or rub, no peripheral edema  ABDOMEN: soft, nontender, no hepatosplenomegaly, no masses and bowel sounds normal  MS: no gross musculoskeletal defects noted, no edema  SKIN: no suspicious lesions or rashes  NEURO: Normal strength and tone, mentation intact and speech normal  PSYCH: mentation appears normal, affect normal/bright    Recent Labs   Lab Test 24  0852 05/10/24  1149 10/18/23  1006   NA  --  137  --    POTASSIUM  --  4.2 5.1   CR  --  0.67  --    A1C 5.5 5.2  --         Diagnostics  No labs were ordered during this visit.   No EKG required, no history of coronary heart disease, significant arrhythmia, peripheral arterial disease or other structural heart disease.    Revised Cardiac Risk Index (RCRI)  The patient has the following serious cardiovascular risks for perioperative complications:   - No serious " cardiac risks = 0 points     RCRI Interpretation: 0 points: Class I (very low risk - 0.4% complication rate)         Signed Electronically by: Joseph Dodson MD  A copy of this evaluation report is provided to the requesting physician.

## 2024-09-26 ENCOUNTER — MYC MEDICAL ADVICE (OUTPATIENT)
Dept: FAMILY MEDICINE | Facility: CLINIC | Age: 69
End: 2024-09-26

## 2024-09-26 NOTE — TELEPHONE ENCOUNTER
Please offer her virtual or in office visit on 10/2 or 10/4, can override hold.    Thanks,    Leonel Costa MD  [facility], [place]  9/26/2024

## 2024-09-26 NOTE — TELEPHONE ENCOUNTER
Routing to . Would you like pt to schedule visit to discuss concerns further? Restart metformin?    Elsie Ng, BHAVANAN, RN     [facility]    09/26/2024 at 1:07 PM

## 2024-10-04 ENCOUNTER — OFFICE VISIT (OUTPATIENT)
Dept: FAMILY MEDICINE | Facility: CLINIC | Age: 69
End: 2024-10-04
Payer: MEDICARE

## 2024-10-04 VITALS
DIASTOLIC BLOOD PRESSURE: 81 MMHG | WEIGHT: 165.3 LBS | HEART RATE: 70 BPM | OXYGEN SATURATION: 97 % | TEMPERATURE: 98.6 F | HEIGHT: 65 IN | BODY MASS INDEX: 27.54 KG/M2 | SYSTOLIC BLOOD PRESSURE: 133 MMHG

## 2024-10-04 DIAGNOSIS — R73.01 ELEVATED FASTING BLOOD SUGAR: ICD-10-CM

## 2024-10-04 DIAGNOSIS — E28.2 PCOS (POLYCYSTIC OVARIAN SYNDROME): Primary | ICD-10-CM

## 2024-10-04 PROCEDURE — 99213 OFFICE O/P EST LOW 20 MIN: CPT | Performed by: STUDENT IN AN ORGANIZED HEALTH CARE EDUCATION/TRAINING PROGRAM

## 2024-10-04 RX ORDER — METFORMIN HYDROCHLORIDE 500 MG/1
500 TABLET, EXTENDED RELEASE ORAL
Qty: 90 TABLET | Refills: 3 | Status: SHIPPED | OUTPATIENT
Start: 2024-10-04

## 2024-10-04 NOTE — PROGRESS NOTES
"  Assessment & Plan     PCOS (polycystic ovarian syndrome)  Elevated fasting blood sugar  Since stopping metformin, fasting blood sugars have been hovering in the 110s to 120s. A1c only 5.5.  Discussed options. Plan to restart metformin 500mg daily.   - metFORMIN (GLUCOPHAGE XR) 500 MG 24 hr tablet  Dispense: 90 tablet; Refill: 3    BMI  Estimated body mass index is 27.51 kg/m  as calculated from the following:    Height as of this encounter: 1.651 m (5' 5\").    Weight as of this encounter: 75 kg (165 lb 4.8 oz).     Follow up in 6 months for annual physical    Leonel Costa MD  [facility]  10/4/2024    Paresh Soto is a 69 year old, presenting for the following health issues:  Follow Up (A1C)        10/4/2024     9:14 AM   Additional Questions   Roomed by Cathleen HERCULES     History of Present Illness       Reason for visit:  Hyperinsulisim  RT PCOS and a rx for metformin    She eats 2-3 servings of fruits and vegetables daily.She consumes 0 sweetened beverage(s) daily.She exercises with enough effort to increase her heart rate 10 to 19 minutes per day.  She exercises with enough effort to increase her heart rate 3 or less days per week.   She is taking medications regularly.     PCOS  Stopped the metformin 500mg daily.  Since that time, her A1c increased from 5.2 to 5.5  She also has noticed increased fasting blood sugars.  They are anywhere from 110 to 120's.   Only one time was this below 100.   Is checking with home glucometer        Objective    /81 (BP Location: Right arm, Patient Position: Sitting, Cuff Size: Adult Regular)   Pulse 70   Temp 98.6  F (37  C) (Oral)   Ht 1.651 m (5' 5\")   Wt 75 kg (165 lb 4.8 oz)   LMP 10/05/2012   SpO2 97%   BMI 27.51 kg/m    Body mass index is 27.51 kg/m .    Physical Exam   GENERAL: healthy, alert and no distress  HEAD: Normocephalic, atraumatic.   EYES: Normal conjunctivae, sclera.   RESP: Normal respiratory effort.  MSK: no gross " musculoskeletal defects noted.  SKIN: no suspicious lesions or rashes.  NEURO: CNII-XII grossly intact. No focal deficits.  PSYCH: Groomed, dressed appropriately for weather. Normal mood with consistent affect.     Signed Electronically by: Leonel Costa MD

## 2024-10-31 DIAGNOSIS — E78.5 HYPERLIPIDEMIA WITH TARGET LDL LESS THAN 130: ICD-10-CM

## 2024-10-31 RX ORDER — SIMVASTATIN 20 MG
20 TABLET ORAL AT BEDTIME
Qty: 90 TABLET | Refills: 0 | Status: SHIPPED | OUTPATIENT
Start: 2024-10-31

## 2024-11-08 DIAGNOSIS — K21.9 GASTROESOPHAGEAL REFLUX DISEASE WITHOUT ESOPHAGITIS: ICD-10-CM

## 2024-11-11 ENCOUNTER — HOSPITAL ENCOUNTER (OUTPATIENT)
Dept: MAMMOGRAPHY | Facility: CLINIC | Age: 69
Discharge: HOME | End: 2024-11-11
Attending: STUDENT IN AN ORGANIZED HEALTH CARE EDUCATION/TRAINING PROGRAM | Admitting: STUDENT IN AN ORGANIZED HEALTH CARE EDUCATION/TRAINING PROGRAM
Payer: MEDICARE

## 2024-11-11 DIAGNOSIS — Z12.31 VISIT FOR SCREENING MAMMOGRAM: ICD-10-CM

## 2024-11-11 PROCEDURE — 77063 BREAST TOMOSYNTHESIS BI: CPT

## 2025-01-21 ENCOUNTER — MYC REFILL (OUTPATIENT)
Dept: FAMILY MEDICINE | Facility: CLINIC | Age: 70
End: 2025-01-21
Payer: MEDICARE

## 2025-01-21 DIAGNOSIS — K21.9 GASTROESOPHAGEAL REFLUX DISEASE WITHOUT ESOPHAGITIS: ICD-10-CM

## 2025-01-21 DIAGNOSIS — E78.5 HYPERLIPIDEMIA WITH TARGET LDL LESS THAN 130: ICD-10-CM

## 2025-01-21 RX ORDER — SIMVASTATIN 20 MG
20 TABLET ORAL AT BEDTIME
Qty: 90 TABLET | Refills: 1 | Status: SHIPPED | OUTPATIENT
Start: 2025-01-21

## 2025-01-29 DIAGNOSIS — I10 ESSENTIAL HYPERTENSION: ICD-10-CM

## 2025-01-30 RX ORDER — HYDROCHLOROTHIAZIDE 12.5 MG/1
CAPSULE ORAL
Qty: 90 CAPSULE | Refills: 0 | Status: SHIPPED | OUTPATIENT
Start: 2025-01-30

## 2025-03-25 NOTE — TELEPHONE ENCOUNTER
REFERRAL INFORMATION:  Referring Provider:  Leonel Costa MD   Referring Clinic:  [facility]   Reason for Visit/Diagnosis: K21.9 (ICD-10-CM) - Gastroesophageal reflux disease without esophagitis      FUTURE VISIT INFORMATION:  Appointment Date: 4/7/25     NOTES STATUS DETAILS   OFFICE NOTE from Referring Provider Internal 5/10/24   MEDICATION LIST Internal    PROCEDURES     ENDOSCOPY  Internal 5/18/22 8/5/16   COLONOSCOPY Internal 9/26/19   STOOL TESTING     LABS     PERTINENT LABS Internal    PATHOLOGY REPORTS (RELATED) Internal EGD 5/18/22   IMAGES     MRI Internal 5/19/22-MR abdomen   XRAY Internal 1/20/22-XR chest

## 2025-04-07 ENCOUNTER — PRE VISIT (OUTPATIENT)
Dept: GASTROENTEROLOGY | Facility: CLINIC | Age: 70
End: 2025-04-07

## 2025-04-07 ENCOUNTER — OFFICE VISIT (OUTPATIENT)
Dept: GASTROENTEROLOGY | Facility: CLINIC | Age: 70
End: 2025-04-07
Payer: MEDICARE

## 2025-04-07 VITALS
DIASTOLIC BLOOD PRESSURE: 81 MMHG | BODY MASS INDEX: 26.66 KG/M2 | HEART RATE: 74 BPM | SYSTOLIC BLOOD PRESSURE: 136 MMHG | OXYGEN SATURATION: 98 % | WEIGHT: 160 LBS | HEIGHT: 65 IN

## 2025-04-07 DIAGNOSIS — R07.9 CHEST PAIN, UNSPECIFIED TYPE: Primary | ICD-10-CM

## 2025-04-07 DIAGNOSIS — K21.9 GASTROESOPHAGEAL REFLUX DISEASE WITHOUT ESOPHAGITIS: ICD-10-CM

## 2025-04-07 PROCEDURE — 3079F DIAST BP 80-89 MM HG: CPT | Performed by: STUDENT IN AN ORGANIZED HEALTH CARE EDUCATION/TRAINING PROGRAM

## 2025-04-07 PROCEDURE — 3075F SYST BP GE 130 - 139MM HG: CPT | Performed by: STUDENT IN AN ORGANIZED HEALTH CARE EDUCATION/TRAINING PROGRAM

## 2025-04-07 PROCEDURE — 1126F AMNT PAIN NOTED NONE PRSNT: CPT | Performed by: STUDENT IN AN ORGANIZED HEALTH CARE EDUCATION/TRAINING PROGRAM

## 2025-04-07 PROCEDURE — 99215 OFFICE O/P EST HI 40 MIN: CPT | Performed by: STUDENT IN AN ORGANIZED HEALTH CARE EDUCATION/TRAINING PROGRAM

## 2025-04-07 RX ORDER — OMEPRAZOLE 40 MG/1
40 CAPSULE, DELAYED RELEASE ORAL 2 TIMES DAILY
Qty: 180 CAPSULE | Refills: 1 | Status: SHIPPED | OUTPATIENT
Start: 2025-04-07

## 2025-04-07 ASSESSMENT — PAIN SCALES - GENERAL: PAINLEVEL_OUTOF10: NO PAIN (0)

## 2025-04-07 NOTE — NURSING NOTE
"Do you have a history of colon cancer in your immediate family? NO    If yes who: negative     And what age  were they diagnosed: n/a      Chief Complaint   Patient presents with    New Patient       Vitals:    04/07/25 1040   BP: 136/81   Pulse: 74   SpO2: 98%   Weight: 72.6 kg (160 lb)   Height: 1.638 m (5' 4.5\")       Body mass index is 27.04 kg/m .    Zunilda Newberry MA      "

## 2025-04-07 NOTE — PATIENT INSTRUCTIONS
It was a pleasure taking care of you today.  I've included a brief summary of our discussion and care plan from today's visit below.  Please review this information with your primary care provider.    GERD   Chest pain    - Will increase the omeprazole dose to 40 mg twice daily to be taken 30 to 40 minutes before breakfast on an empty stomach and 30 to 40 minutes before supper.  I emphasized the importance of adhering to twice daily dosing and make sure not to miss a dose.  - Patient will avoid reflux triggering foods including spicy food acidic foods citrusy food made coffee tea chocolate tomato based products alcohol carbonated beverages vinegar  - Patient will avoid foods and snacks 3 to 4 hours before bedtime.  - Patient will elevate the head of the bed while sleeping  - Will proceed with upper endoscopy for careful evaluation of the tubular esophagus and to rule out any evidence of reflux damage  - Return to clinic after testing ordered       If you feel you received exceptional care and are interested in supporting the clinical and research goals of Dr. Son or the Division of Gastroenterology, Hepatology, and Nutrition please contact him directly through [employer]  to discuss opportunities to donate.    Please call my nurse Fer ([phone]) or Deborah ([phone]) with any questions or concerns.     Sincerely,    Marcelo Monet MD      Please see below for any additional questions and scheduling guidelines.    Sign up for [employer]: [employer] patient portal serves as a secure platform for accessing your medical records from the [facility]. Additionally, [employer] facilitates easy, timely, and secure messaging with your care team. If you have not signed up, you may do so by using the provided code or calling [phone].    Coordinating your care after your visit:  There are multiple options for scheduling your follow-up care based on your provider's recommendation.    How do I schedule a follow-up  clinic appointment:   After your appointment, you may receive scheduling assistance with the Clinic Coordinators by having a seat in the waiting room and a Clinic Coordinator will call you up to schedule.  Virtual visits or after you leave the clinic:  Your provider has placed a follow-up order in the [facility] portal for scheduling your return appointment. A member of the scheduling team will contact you to schedule.  [facility] Scheduling: Timely scheduling through [employer] is advised to ensure appointment availability.   Call to schedule: You may schedule your follow-up appointment(s) by calling [phone], option 1.    How do I schedule my endoscopy or colonoscopy procedure:  If a procedure, such as a colonoscopy or upper endoscopy was ordered by your provider, the scheduling team will contact you to schedule this procedure. Or you may choose to call to schedule at   [phone], option 2.  Please allow 20-30 minutes when scheduling a procedure.    How do I get my blood work done? To get your blood work done, you need to schedule a lab appointment at an [facility]. There are multiple ways to schedule:   At the clinic: The Clinic Coordinator you meet after your visit can help you schedule a lab appointment.   [facility] scheduling: [facility] offers online lab scheduling at all [facility] laboratory locations.   Call to schedule: You can call [phone] to schedule your lab appointment.    How do I schedule my imaging study: To schedule imaging studies, such as CT scans, ultrasounds, MRIs, or X-rays, contact Imaging Services at [phone].    How do I schedule a referral to another doctor: If your provider recommended a referral to another specialist(s), the referral order was placed by your provider. You will receive a phone call to schedule this referral, or you may choose to call the number attached to the referral to self-schedule.    For Post-Visit Question(s):  For any inquiries  following today's visit:  Please utilize [employer] messaging and allow 48 hours for reply or contact the Call Center during normal business hours at [phone], option 3.  For Emergent After-hours questions, contact the On-Call GI Fellow through the [facility]  at [phone].  In addition, you may contact your Nurse directly using the provided contact information.    Test Results:  Test results will be accessible via [employer] in compliance with the 21st Century Cures Act. This means that your results will be available to you at the same time as your provider. Often you may see your results before your provider does. Results are reviewed by staff within two weeks with communication follow-up. Results may be released in the patient portal prior to your care team review.    Prescription Refill(s):  Medication prescribed by your provider will be addressed during your visit. For future refills, please coordinate with your pharmacy. If you have not had a recent clinic visit or routine labs, for your safety, your provider may not be able to refill your prescription.

## 2025-04-07 NOTE — LETTER
4/7/2025      Brittany Chan  [address]  [facility] [id number]      Dear Colleague,    Thank you for referring your patient, Brittany Chan, to the [facility] [place]. Please see a copy of my visit note below.    Gastroenterology Visit for: Brittany Chan 1955   MRN: [id number]     Reason for Visit:  chief complaint GERD and chest pain    Referred by: Igor  / [id number] / [place] [address]  Patient Care Team:  Leonel Costa MD as PCP - General (Family Practice)  Aditya Agrawal MD as MD (Gastroenterology)  Leonel Costa MD as Assigned PCP  Angelica Iniguez AuD as Audiologist (Audiology)  Abimael Farias MD as MD (Otolaryngology)  Marcelo Monet MD as Physician (Gastroenterology)    History of Present Illness:   Brittany Chan is a delightful 70 year old adult who is presenting as a new patient in consultation at the request of Dr. Costa with a chief complaint of GERD and chest pain.    With regards to GERD the patient reports over a decade history of GERD as evidenced by acid regurgitation that happens anywhere from once every other week to 3-4 times a week depending on what she eats and whether she uses the wedge pillow or not, while on omeprazole 20 mg twice daily.    With regards to sternal pain she reports several years history of midsternal sharp nonradiating pain that usually happens at night when she is sleeping and wakes her up at night.  It is not associated with respiration, it is not positional, it is not related to food at close and it is not associated with eating.  She has tried Gaviscon and Tums and Pepcid in an attempt to improve the pain with no reliable benefit.  The patient has had formal cardiology evaluation that has been negative.  She has a cousin who was diagnosed with esophageal spasm and was placed on a medication with improvement.    She does not report dysphagia or odynophagia or choking gagging or coughing.    The patient  had an upper endoscopy in 2016 that per report showed a completely normal esophagus and the rest of the exam was normal as well.  Following this exam 20 mg daily Prilosec was recommended.  She had another endoscopy done on 5/18/2022 for the indication of follow-up of GERD which showed a medium size hiatal hernia with the exact size not reported an irregular Z-line that was biopsied and mildly erythematous gastric antrum that was biopsied as well.      Wt Readings from Last 5 Encounters:   04/07/25 72.6 kg (160 lb)   12/20/24 74.4 kg (164 lb)   10/04/24 75 kg (165 lb 4.8 oz)   08/19/24 73 kg (161 lb)   05/10/24 71.7 kg (158 lb)         STUDIES & PROCEDURES:       Prior medical records were reviewed including, but not limited to, notes from referring providers, lab work, radiographic tests, and other diagnostic tests. Pertinent results were summarized above.     History     Past Medical History:   Diagnosis Date     Abnormal Pap smear, can't excl hi gd sq intraepithelial lesion (ASC-H) 09/29/2011     Arthritis      Cancer (H)      Complication of anesthesia     Gets tachycardic from epi in Dental numbing injections     GERD (gastroesophageal reflux disease)      High cholesterol      High cholesterol      Incontinence of urine in female      Pelvic floor relaxation        Past Surgical History:   Procedure Laterality Date     BREAST LUMPECTOMY, RT/LT  1978    lt fibroadenoma     COLONOSCOPY       COLONOSCOPY Left 09/26/2019    Procedure: COLONOSCOPY;  Surgeon: Renita Pablo MD;  Location:  GI     DAVINCI PELVIC PROCEDURE N/A 12/14/2020    Procedure: Robotic assisted sacrocolpopexy, supracervical hysterectomy with bilateral salpingo-oophorectomy, abdominal enterocele repair, midurethral sling, cystoscopy, and  posterior repair;  Surgeon: Gail Yang MD;  Location:  OR     ESOPHAGOSCOPY, GASTROSCOPY, DUODENOSCOPY (EGD), COMBINED N/A 08/05/2016    Procedure: COMBINED ESOPHAGOSCOPY, GASTROSCOPY,  DUODENOSCOPY (EGD);  Surgeon: Feroz Ramirez MD;  Location:  GI     ESOPHAGOSCOPY, GASTROSCOPY, DUODENOSCOPY (EGD), COMBINED N/A 2022    Procedure: ESOPHAGOGASTRODUODENOSCOPY, WITH BIOPSY;  Surgeon: Aditya Agrawal MD;  Location:  GI     ORTHOPEDIC SURGERY Right 2014    rotator cuff repair     TONSILLECTOMY & ADENOIDECTOMY       VITRECTOMY ANTERIOR Left        Social History     Socioeconomic History     Marital status:      Spouse name: Not on file     Number of children: Not on file     Years of education: Not on file     Highest education level: Not on file   Occupational History     Not on file   Tobacco Use     Smoking status: Former     Current packs/day: 0.00     Types: Cigarettes     Quit date: 1980     Years since quittin.2     Passive exposure: Past     Smokeless tobacco: Never   Vaping Use     Vaping status: Never Used   Substance and Sexual Activity     Alcohol use: Yes     Comment: 5 a week     Drug use: No     Sexual activity: Yes     Partners: Male     Birth control/protection: None     Comment: menopause, IUD removal needed   Other Topics Concern     Parent/sibling w/ CABG, MI or angioplasty before 65F 55M? No   Social History Narrative     Not on file     Social Drivers of Health     Financial Resource Strain: Low Risk  (2024)    Financial Resource Strain      Within the past 12 months, have you or your family members you live with been unable to get utilities (heat, electricity) when it was really needed?: No   Food Insecurity: Low Risk  (2024)    Food Insecurity      Within the past 12 months, did you worry that your food would run out before you got money to buy more?: No      Within the past 12 months, did the food you bought just not last and you didn t have money to get more?: No   Transportation Needs: Low Risk  (2024)    Transportation Needs      Within the past 12 months, has lack of transportation kept you from medical appointments, getting  your medicines, non-medical meetings or appointments, work, or from getting things that you need?: No   Physical Activity: Insufficiently Active (5/4/2024)    Exercise Vital Sign      Days of Exercise per Week: 3 days      Minutes of Exercise per Session: 40 min   Stress: No Stress Concern Present (5/4/2024)    [employer] - Occupational Stress Questionnaire      Feeling of Stress : Not at all   Social Connections: Unknown (5/4/2024)    Social Connection and Isolation Panel [NHANES]      Frequency of Communication with Friends and Family: Not on file      Frequency of Social Gatherings with Friends and Family: Once a week      Attends [employer] Services: Not on file      Active Member of Clubs or Organizations: Not on file      Attends Club or Organization Meetings: Not on file      Marital Status: Not on file   Interpersonal Safety: Low Risk  (8/19/2024)    Interpersonal Safety      Do you feel physically and emotionally safe where you currently live?: Yes      Within the past 12 months, have you been hit, slapped, kicked or otherwise physically hurt by someone?: No      Within the past 12 months, have you been humiliated or emotionally abused in other ways by your partner or ex-partner?: No   Housing Stability: Low Risk  (5/4/2024)    Housing Stability      Do you have housing? : Yes      Are you worried about losing your housing?: No       Family History   Problem Relation Age of Onset     Hypertension Father      Prostate Cancer Father      Eye Disorder Father         macular degeneration     Diabetes Father      Allergies Mother         nuts     Arthritis Mother      Osteoporosis Mother      Hyperlipidemia Mother      Obesity Sister      Gynecology Sister         polycystic ovaries     Obesity Sister      Diabetes Nephew         type 2     Anxiety Disorder Daughter      Diabetes Nephew      Family history reviewed and edited as appropriate    Medications and Allergies:  "    Outpatient Encounter Medications as of 4/7/2025   Medication Sig Dispense Refill     Cholecalciferol (VITAMIN D) 1000 UNIT capsule Take 2 capsules by mouth daily.        COLLAGEN PO        conjugated estrogens (PREMARIN) 0.625 MG/GM vaginal cream Place 0.5 g vaginally twice a week 30 g 11     famotidine (PEPCID) 20 MG tablet Take 20 mg by mouth 2 times daily       fish oil-omega-3 fatty acids 1000 MG capsule Take 1 g by mouth 2 times daily       Flaxseed (Linseed) (FLAX SEED OIL) 1000 MG capsule Take 1 capsule by mouth daily       hydrochlorothiazide (MICROZIDE) 12.5 MG capsule TAKE 1 CAPSULE(12.5 MG) BY MOUTH DAILY 90 capsule 0     melatonin 3 MG tablet Take 3 mg by mouth nightly as needed for sleep       metFORMIN (GLUCOPHAGE XR) 500 MG 24 hr tablet Take 1 tablet (500 mg) by mouth daily (with dinner). 90 tablet 3     multivitamin w/minerals (THERA-VIT-M) tablet Take 1 tablet by mouth daily       omeprazole (PRILOSEC) 20 MG DR capsule Take 1 capsule (20 mg) by mouth 2 times daily. 180 capsule 2     omeprazole (PRILOSEC) 40 MG DR capsule Take 1 capsule (40 mg) by mouth 2 times daily. 180 capsule 1     Probiotic Product (PROBIOTIC PO) Take by mouth daily        simvastatin (ZOCOR) 20 MG tablet Take 1 tablet (20 mg) by mouth at bedtime. 90 tablet 1     No facility-administered encounter medications on file as of 4/7/2025.        Allergies   Allergen Reactions     Lisinopril Cough     Thimerosal      preservative in contact solution -        Review of systems:  A full 10 point review of systems was obtained and was negative except for the pertinent positives and negatives stated within the HPI.    Objective Findings:   Physical Exam:    Constitutional: /81   Pulse 74   Ht 1.638 m (5' 4.5\")   Wt 72.6 kg (160 lb)   LMP 10/05/2012   SpO2 98%   BMI 27.04 kg/m    General: Alert, cooperative, no distress, well-appearing  Head: Atraumatic, normocephalic, no obvious abnormalities   Eyes: EOMI, Sclera " anicteriC  Skin: No jaundice, no obvious rash  Neurologic: AAOx3, no obvious neurologic abnormality  Psychiatric: Normal Affect, appropriate mood  Extremities: No obvious edema, no obvious malformation     Labs, Radiology, Pathology     Lab Results   Component Value Date    WBC 5.1 01/19/2023    WBC 5.3 01/20/2022    WBC 3.5 (L) 07/28/2021    HGB 13.4 01/19/2023    HGB 14.2 11/17/2022    HGB 14.9 01/20/2022     01/19/2023     01/20/2022     07/28/2021    CHOL 218 (H) 05/10/2024    CHOL 217 (H) 08/11/2023    CHOL 315 (H) 05/04/2023    TRIG 67 05/10/2024    TRIG 57 08/11/2023    TRIG 67 05/04/2023    HDL 99 05/10/2024     08/11/2023     05/04/2023    ALT 19 01/19/2023    ALT 35 11/08/2021    ALT 32 07/28/2021    AST 20 01/19/2023    AST 21 11/08/2021    AST 21 07/28/2021     05/10/2024     01/19/2023     11/17/2022    BUN 16.5 05/10/2024    BUN 20.2 01/19/2023    BUN 22.7 11/17/2022    CO2 28 05/10/2024    CO2 23 01/19/2023    CO2 27 11/17/2022    TSH 1.38 07/28/2021    TSH 0.86 10/07/2020    TSH 0.96 08/15/2019        Liver Function Studies -   Recent Labs   Lab Test 01/19/23  0833   PROTTOTAL 7.0   ALBUMIN 4.4   BILITOTAL 0.4   ALKPHOS 76   AST 20   ALT 19          Patient Active Problem List    Diagnosis Date Noted     Elevated fasting blood sugar 10/04/2024     Priority: Medium     At home fasting blood sugars are between 110-120.       Macular pucker, bilateral 08/19/2024     Priority: Medium     Osteopenia of multiple sites 06/05/2023     Priority: Medium     Based on 6/2023 DEXA scan.    FINDINGS:               Lumbar Spine (L1-L4)      T-score:  0.6, marked degenerative changes present                Left Femoral Neck            T-score:  -1.4               Right Femoral Neck          T-score:  -1.3  This patient's risks based on available information, with the use of FRAX, are 8.8 % for major osteoporotic fracture and 0.9 % for hip fracture.   Based on  these guidelines, treatment (in addition to calcium and vitamin D) is not recommended for this patient, after ruling out other causes of osteoporosis.       Post-menopausal atrophic vaginitis 04/17/2023     Priority: Medium     Osteoarthritis of finger, unspecified laterality 07/28/2021     Priority: Medium     Eczema, unspecified type 07/28/2021     Priority: Medium     Mild, intermittent flaking rash over elbows.  Treats with mometasone x14 days for flares       Essential hypertension 07/28/2021     Priority: Medium     Anxiety 05/31/2016     Priority: Medium     Stress incontinence 10/12/2012     Priority: Medium     Overweight (BMI 25.0-29.9) 10/12/2012     Priority: Medium     Family history of diabetes mellitus 09/29/2011     Priority: Medium     Father, borderline, lost weight and went away       Atypical squamous cells cannot exclude high grade squamous intraepithelial lesion on cytologic smear of cervix (ASC-H) 09/29/2011     Priority: Medium     09/29/11 ASC-H: referred for colposcopy by 01/2012  10/28/11 Colposcopy done with [facility], abstracted in chart. Per patient, results were unremarkable and will be having follow up pap with them by April 2012 05/16/12 Pap= Normal (abstracted into chart)  10/12/12 Dx pap= Normal. Plan r/p 1 year- due 10/2013  10/29/13 Dx pap= ASCUS, Negative HPV. Routine screening.  01/19/15 Pap= ASCUS, Neg HPV. Co-test 1 yr per Dr. Polanco  03/18/16 Pap= NIL, Neg HPV. 3 yr co-test  8/15/19 Pap: NIL/neg HR HPV. Pap in 3 years       Hyperlipidemia with target LDL less than 130 10/31/2010     Priority: Medium     LDL >190 off statin medication in 2023  On simvastatin, 20 mg       Hemorrhoid 06/04/2009     Priority: Medium     Mild internal hemorrhoids       PCOS (polycystic ovarian syndrome) 04/28/2009     Priority: Medium     H/o PCOS & irregular periods in past.        Esophageal reflux 01/19/2007     Priority: Medium     On omeprazole BID and H2B prn for symptoms. EGD in  2022 with hiatal hernia. No red flag symptoms. Continue current regimen.        Loss of height 08/04/2003     Priority: Medium     Family history of other endocrine and metabolic diseases 08/04/2003     Priority: Medium     Problem list name updated by automated process. Provider to review and confirm       Flatulence, eructation, and gas pain 08/04/2003     Priority: Medium      Assessment and Plan   Assessment/Plan:    # GERD with typical symptoms of acid regurgitation that happens anywhere from once every other week to 3-4 times a week depending on what she eats and whether she uses the wedge pillow or not, while on omeprazole 20 mg twice daily.    # Several years history of midsternal sharp nonradiating chest pain that usually happens at night during sleep not associated with respiration, it is not positional, it is not related to food at close and it is not associated with eating with no improvement with a combination of twice daily omeprazole 20 mg plus Gaviscon plus Tums and Pepcid and negative formal cardiology evaluation    We discussed the pathogenesis of GERD and its anatomic correlates. I explained the components and function of the anti-reflux barrier and how these mechanisms can become impaired in patients with GERD. The typical symptomatic manifestations of GERD are heartburn and acid regurgitation. Extra-esophageal symptoms of GERD, such as chronic cough, throat clearing, globus sensation, and hoarseness are less likely to be a sole manifestation of GERD and may also be tied to sinopulmonary disease, neuropathic hypersensitivity, post-surgical scarring, or other causes. We discussed that GERD is managed with lifestyle modifications (elevation of the head of the bed, avoidance of evening food/fluid intake, and maintenance of ideal body weight) as well as medications, including PPI's, H2RA, and antacids.    We discussed the potential associations described with the long-term use of PPIs including  fractures, intestinal infections (e.g. C difficile), pneumonia, hypomagnesemia, cardiac events, chronic kidney disease, dementia, stroke and even death.  We emphasized that these are associations established by statistical testing of large administrative claims databases and so far there has been no causal relationship between any of the PPIs and these conditions. Furthermore, recent RCT data with 3-year follow up showed only an association with enteric infection and not the other outcomes discussed above.  We indicated that daily chronic PPI use is indicated for those whose symptoms are negatively and significantly affecting their quality of life, those with erosive esophagitis, and those with Friend's esophagus. We also briefly discussed the surgical management of GERD. Lastly, we discussed the potential esophageal complications of GERD including esophagitis, stenosis/stricture formation and Friend's esophagus.  The latter may be seen in about 10% patients with chronic GERD.     - Will increase the omeprazole dose to 40 mg twice daily to be taken 30 to 40 minutes before breakfast on an empty stomach and 30 to 40 minutes before supper.  I emphasized the importance of adhering to twice daily dosing and make sure not to miss a dose.  - Patient will avoid reflux triggering foods including spicy food acidic foods citrusy food made coffee tea chocolate tomato based products alcohol carbonated beverages vinegar  - Patient will avoid foods and snacks 3 to 4 hours before bedtime.  - Patient will elevate the head of the bed while sleeping  - Will proceed with upper endoscopy for careful evaluation of the tubular esophagus and to rule out any evidence of reflux damage  - Return to clinic after testing ordered.  Depending on her improvement of typical reflux symptoms including acid regurgitation and her atypical symptom including chest pain, we will decide on proceeding with pH impedance on 4 mg twice daily PPI and  high-resolution esophageal manometry to further evaluate chest pain    Follow up plan:   Return to clinic after EGD in 12 weeks and as needed.    The risks and benefits of my recommendations, as well as other treatment options were discussed with the patient and any available family today. All questions were answered.     Follow up: As planned above. Today, I personally spent 50 minutes spent on the date of the encounter doing chart review, history and exam, documentation and further activities per the note.    The patient verbalized understanding of the plan and was appreciative for the time spent and information provided during the office visit.     Author:   Marcelo Monet MD    Director of Digital Health and [place]  Co-Director of Esophageal Disorders Program   of Medicine  Division of Gastroenterology, Hepatology and Nutrition    [facility] [id number]  [address] [place] [address]    Dr. Monet speaks for [facility] regarding vonoprazan. He receives income for these activities. When discussing the medication, patients were informed of Dr. Monet's role and potential conflict of interest. All questions and/or concerns were answered during the encounter.     Documentation assisted by voice recognition and documentation system.       Again, thank you for allowing me to participate in the care of your patient.        Sincerely,        Marcelo Monet MD    Electronically signed

## 2025-04-07 NOTE — PROGRESS NOTES
Gastroenterology Visit for: Brittany Chan 1955   MRN: [id number]     Reason for Visit:  chief complaint GERD and chest pain    Referred by: Igor  / [id number] / [place] [address]  Patient Care Team:  Leonel Costa MD as PCP - General (Family Practice)  Aditya Agrawal MD as MD (Gastroenterology)  Leonel Costa MD as Assigned PCP  Angelica Iniguez AuD as Audiologist (Audiology)  Abimael Farias MD as MD (Otolaryngology)  Marcelo Monet MD as Physician (Gastroenterology)    History of Present Illness:   Brittany Chan is a delightful 70 year old adult who is presenting as a new patient in consultation at the request of Dr. Costa with a chief complaint of GERD and chest pain.    With regards to GERD the patient reports over a decade history of GERD as evidenced by acid regurgitation that happens anywhere from once every other week to 3-4 times a week depending on what she eats and whether she uses the wedge pillow or not, while on omeprazole 20 mg twice daily.    With regards to sternal pain she reports several years history of midsternal sharp nonradiating pain that usually happens at night when she is sleeping and wakes her up at night.  It is not associated with respiration, it is not positional, it is not related to food at close and it is not associated with eating.  She has tried Gaviscon and Tums and Pepcid in an attempt to improve the pain with no reliable benefit.  The patient has had formal cardiology evaluation that has been negative.  She has a cousin who was diagnosed with esophageal spasm and was placed on a medication with improvement.    She does not report dysphagia or odynophagia or choking gagging or coughing.    The patient had an upper endoscopy in 2016 that per report showed a completely normal esophagus and the rest of the exam was normal as well.  Following this exam 20 mg daily Prilosec was recommended.  She had another endoscopy done on 5/18/2022 for the indication of  follow-up of GERD which showed a medium size hiatal hernia with the exact size not reported an irregular Z-line that was biopsied and mildly erythematous gastric antrum that was biopsied as well.      Wt Readings from Last 5 Encounters:   04/07/25 72.6 kg (160 lb)   12/20/24 74.4 kg (164 lb)   10/04/24 75 kg (165 lb 4.8 oz)   08/19/24 73 kg (161 lb)   05/10/24 71.7 kg (158 lb)         STUDIES & PROCEDURES:       Prior medical records were reviewed including, but not limited to, notes from referring providers, lab work, radiographic tests, and other diagnostic tests. Pertinent results were summarized above.     History     Past Medical History:   Diagnosis Date    Abnormal Pap smear, can't excl hi gd sq intraepithelial lesion (ASC-H) 09/29/2011    Arthritis     Cancer (H)     Complication of anesthesia     Gets tachycardic from epi in Dental numbing injections    GERD (gastroesophageal reflux disease)     High cholesterol     High cholesterol     Incontinence of urine in female     Pelvic floor relaxation        Past Surgical History:   Procedure Laterality Date    BREAST LUMPECTOMY, RT/LT  1978    lt fibroadenoma    COLONOSCOPY      COLONOSCOPY Left 09/26/2019    Procedure: COLONOSCOPY;  Surgeon: Renita Pablo MD;  Location:  GI    DAVINCI PELVIC PROCEDURE N/A 12/14/2020    Procedure: Robotic assisted sacrocolpopexy, supracervical hysterectomy with bilateral salpingo-oophorectomy, abdominal enterocele repair, midurethral sling, cystoscopy, and  posterior repair;  Surgeon: Gail Yang MD;  Location:  OR    ESOPHAGOSCOPY, GASTROSCOPY, DUODENOSCOPY (EGD), COMBINED N/A 08/05/2016    Procedure: COMBINED ESOPHAGOSCOPY, GASTROSCOPY, DUODENOSCOPY (EGD);  Surgeon: Feroz Ramirez MD;  Location:  GI    ESOPHAGOSCOPY, GASTROSCOPY, DUODENOSCOPY (EGD), COMBINED N/A 05/18/2022    Procedure: ESOPHAGOGASTRODUODENOSCOPY, WITH BIOPSY;  Surgeon: Aditya Agrawal MD;  Location: [facility]    ORTHOPEDIC SURGERY  Right 2014    rotator cuff repair    TONSILLECTOMY & ADENOIDECTOMY      VITRECTOMY ANTERIOR Left        Social History     Socioeconomic History    Marital status:      Spouse name: Not on file    Number of children: Not on file    Years of education: Not on file    Highest education level: Not on file   Occupational History    Not on file   Tobacco Use    Smoking status: Former     Current packs/day: 0.00     Types: Cigarettes     Quit date: 1980     Years since quittin.2     Passive exposure: Past    Smokeless tobacco: Never   Vaping Use    Vaping status: Never Used   Substance and Sexual Activity    Alcohol use: Yes     Comment: 5 a week    Drug use: No    Sexual activity: Yes     Partners: Male     Birth control/protection: None     Comment: menopause, IUD removal needed   Other Topics Concern    Parent/sibling w/ CABG, MI or angioplasty before 65F 55M? No   Social History Narrative    Not on file     Social Drivers of Health     Financial Resource Strain: Low Risk  (2024)    Financial Resource Strain     Within the past 12 months, have you or your family members you live with been unable to get utilities (heat, electricity) when it was really needed?: No   Food Insecurity: Low Risk  (2024)    Food Insecurity     Within the past 12 months, did you worry that your food would run out before you got money to buy more?: No     Within the past 12 months, did the food you bought just not last and you didn t have money to get more?: No   Transportation Needs: Low Risk  (2024)    Transportation Needs     Within the past 12 months, has lack of transportation kept you from medical appointments, getting your medicines, non-medical meetings or appointments, work, or from getting things that you need?: No   Physical Activity: Insufficiently Active (2024)    Exercise Vital Sign     Days of Exercise per Week: 3 days     Minutes of Exercise per Session: 40 min   Stress: No Stress Concern Present  (5/4/2024)    [employer] - Occupational Stress Questionnaire     Feeling of Stress : Not at all   Social Connections: Unknown (5/4/2024)    Social Connection and Isolation Panel [NHANES]     Frequency of Communication with Friends and Family: Not on file     Frequency of Social Gatherings with Friends and Family: Once a week     Attends Jewish Services: Not on file     Active Member of Clubs or Organizations: Not on file     Attends Club or Organization Meetings: Not on file     Marital Status: Not on file   Interpersonal Safety: Low Risk  (8/19/2024)    Interpersonal Safety     Do you feel physically and emotionally safe where you currently live?: Yes     Within the past 12 months, have you been hit, slapped, kicked or otherwise physically hurt by someone?: No     Within the past 12 months, have you been humiliated or emotionally abused in other ways by your partner or ex-partner?: No   Housing Stability: Low Risk  (5/4/2024)    Housing Stability     Do you have housing? : Yes     Are you worried about losing your housing?: No       Family History   Problem Relation Age of Onset    Hypertension Father     Prostate Cancer Father     Eye Disorder Father         macular degeneration    Diabetes Father     Allergies Mother         nuts    Arthritis Mother     Osteoporosis Mother     Hyperlipidemia Mother     Obesity Sister     Gynecology Sister         polycystic ovaries    Obesity Sister     Diabetes Nephew         type 2    Anxiety Disorder Daughter     Diabetes Nephew      Family history reviewed and edited as appropriate    Medications and Allergies:     Outpatient Encounter Medications as of 4/7/2025   Medication Sig Dispense Refill    Cholecalciferol (VITAMIN D) 1000 UNIT capsule Take 2 capsules by mouth daily.       COLLAGEN PO       conjugated estrogens (PREMARIN) 0.625 MG/GM vaginal cream Place 0.5 g vaginally twice a week 30 g 11    famotidine (PEPCID) 20 MG tablet Take 20 mg  "by mouth 2 times daily      fish oil-omega-3 fatty acids 1000 MG capsule Take 1 g by mouth 2 times daily      Flaxseed (Linseed) (FLAX SEED OIL) 1000 MG capsule Take 1 capsule by mouth daily      hydrochlorothiazide (MICROZIDE) 12.5 MG capsule TAKE 1 CAPSULE(12.5 MG) BY MOUTH DAILY 90 capsule 0    melatonin 3 MG tablet Take 3 mg by mouth nightly as needed for sleep      metFORMIN (GLUCOPHAGE XR) 500 MG 24 hr tablet Take 1 tablet (500 mg) by mouth daily (with dinner). 90 tablet 3    multivitamin w/minerals (THERA-VIT-M) tablet Take 1 tablet by mouth daily      omeprazole (PRILOSEC) 20 MG DR capsule Take 1 capsule (20 mg) by mouth 2 times daily. 180 capsule 2    omeprazole (PRILOSEC) 40 MG DR capsule Take 1 capsule (40 mg) by mouth 2 times daily. 180 capsule 1    Probiotic Product (PROBIOTIC PO) Take by mouth daily       simvastatin (ZOCOR) 20 MG tablet Take 1 tablet (20 mg) by mouth at bedtime. 90 tablet 1     No facility-administered encounter medications on file as of 4/7/2025.        Allergies   Allergen Reactions    Lisinopril Cough    Thimerosal      preservative in contact solution -        Review of systems:  A full 10 point review of systems was obtained and was negative except for the pertinent positives and negatives stated within the HPI.    Objective Findings:   Physical Exam:    Constitutional: /81   Pulse 74   Ht 1.638 m (5' 4.5\")   Wt 72.6 kg (160 lb)   LMP 10/05/2012   SpO2 98%   BMI 27.04 kg/m    General: Alert, cooperative, no distress, well-appearing  Head: Atraumatic, normocephalic, no obvious abnormalities   Eyes: EOMI, Sclera anicteriC  Skin: No jaundice, no obvious rash  Neurologic: AAOx3, no obvious neurologic abnormality  Psychiatric: Normal Affect, appropriate mood  Extremities: No obvious edema, no obvious malformation     Labs, Radiology, Pathology     Lab Results   Component Value Date    WBC 5.1 01/19/2023    WBC 5.3 01/20/2022    WBC 3.5 (L) 07/28/2021    HGB 13.4 01/19/2023 "    HGB 14.2 11/17/2022    HGB 14.9 01/20/2022     01/19/2023     01/20/2022     07/28/2021    CHOL 218 (H) 05/10/2024    CHOL 217 (H) 08/11/2023    CHOL 315 (H) 05/04/2023    TRIG 67 05/10/2024    TRIG 57 08/11/2023    TRIG 67 05/04/2023    HDL 99 05/10/2024     08/11/2023     05/04/2023    ALT 19 01/19/2023    ALT 35 11/08/2021    ALT 32 07/28/2021    AST 20 01/19/2023    AST 21 11/08/2021    AST 21 07/28/2021     05/10/2024     01/19/2023     11/17/2022    BUN 16.5 05/10/2024    BUN 20.2 01/19/2023    BUN 22.7 11/17/2022    CO2 28 05/10/2024    CO2 23 01/19/2023    CO2 27 11/17/2022    TSH 1.38 07/28/2021    TSH 0.86 10/07/2020    TSH 0.96 08/15/2019        Liver Function Studies -   Recent Labs   Lab Test 01/19/23  0833   PROTTOTAL 7.0   ALBUMIN 4.4   BILITOTAL 0.4   ALKPHOS 76   AST 20   ALT 19          Patient Active Problem List    Diagnosis Date Noted    Elevated fasting blood sugar 10/04/2024     Priority: Medium     At home fasting blood sugars are between 110-120.      Macular pucker, bilateral 08/19/2024     Priority: Medium    Osteopenia of multiple sites 06/05/2023     Priority: Medium     Based on 6/2023 DEXA scan.    FINDINGS:               Lumbar Spine (L1-L4)      T-score:  0.6, marked degenerative changes present                Left Femoral Neck            T-score:  -1.4               Right Femoral Neck          T-score:  -1.3  This patient's risks based on available information, with the use of FRAX, are 8.8 % for major osteoporotic fracture and 0.9 % for hip fracture.   Based on these guidelines, treatment (in addition to calcium and vitamin D) is not recommended for this patient, after ruling out other causes of osteoporosis.      Post-menopausal atrophic vaginitis 04/17/2023     Priority: Medium    Osteoarthritis of finger, unspecified laterality 07/28/2021     Priority: Medium    Eczema, unspecified type 07/28/2021     Priority: Medium      Mild, intermittent flaking rash over elbows.  Treats with mometasone x14 days for flares      Essential hypertension 07/28/2021     Priority: Medium    Anxiety 05/31/2016     Priority: Medium    Stress incontinence 10/12/2012     Priority: Medium    Overweight (BMI 25.0-29.9) 10/12/2012     Priority: Medium    Family history of diabetes mellitus 09/29/2011     Priority: Medium     Father, borderline, lost weight and went away      Atypical squamous cells cannot exclude high grade squamous intraepithelial lesion on cytologic smear of cervix (ASC-H) 09/29/2011     Priority: Medium     09/29/11 ASC-H: referred for colposcopy by 01/2012  10/28/11 Colposcopy done with [facility], abstracted in chart. Per patient, results were unremarkable and will be having follow up pap with them by April 2012 05/16/12 Pap= Normal (abstracted into chart)  10/12/12 Dx pap= Normal. Plan r/p 1 year- due 10/2013  10/29/13 Dx pap= ASCUS, Negative HPV. Routine screening.  01/19/15 Pap= ASCUS, Neg HPV. Co-test 1 yr per Dr. Polanco  03/18/16 Pap= NIL, Neg HPV. 3 yr co-test  8/15/19 Pap: NIL/neg HR HPV. Pap in 3 years      Hyperlipidemia with target LDL less than 130 10/31/2010     Priority: Medium     LDL >190 off statin medication in 2023  On simvastatin, 20 mg      Hemorrhoid 06/04/2009     Priority: Medium     Mild internal hemorrhoids      PCOS (polycystic ovarian syndrome) 04/28/2009     Priority: Medium     H/o PCOS & irregular periods in past.       Esophageal reflux 01/19/2007     Priority: Medium     On omeprazole BID and H2B prn for symptoms. EGD in 2022 with hiatal hernia. No red flag symptoms. Continue current regimen.       Loss of height 08/04/2003     Priority: Medium    Family history of other endocrine and metabolic diseases 08/04/2003     Priority: Medium     Problem list name updated by automated process. Provider to review and confirm      Flatulence, eructation, and gas pain 08/04/2003     Priority: Medium       Assessment and Plan   Assessment/Plan:    # GERD with typical symptoms of acid regurgitation that happens anywhere from once every other week to 3-4 times a week depending on what she eats and whether she uses the wedge pillow or not, while on omeprazole 20 mg twice daily.    # Several years history of midsternal sharp nonradiating chest pain that usually happens at night during sleep not associated with respiration, it is not positional, it is not related to food at close and it is not associated with eating with no improvement with a combination of twice daily omeprazole 20 mg plus Gaviscon plus Tums and Pepcid and negative formal cardiology evaluation    We discussed the pathogenesis of GERD and its anatomic correlates. I explained the components and function of the anti-reflux barrier and how these mechanisms can become impaired in patients with GERD. The typical symptomatic manifestations of GERD are heartburn and acid regurgitation. Extra-esophageal symptoms of GERD, such as chronic cough, throat clearing, globus sensation, and hoarseness are less likely to be a sole manifestation of GERD and may also be tied to sinopulmonary disease, neuropathic hypersensitivity, post-surgical scarring, or other causes. We discussed that GERD is managed with lifestyle modifications (elevation of the head of the bed, avoidance of evening food/fluid intake, and maintenance of ideal body weight) as well as medications, including PPI's, H2RA, and antacids.    We discussed the potential associations described with the long-term use of PPIs including fractures, intestinal infections (e.g. C difficile), pneumonia, hypomagnesemia, cardiac events, chronic kidney disease, dementia, stroke and even death.  We emphasized that these are associations established by statistical testing of large administrative claims databases and so far there has been no causal relationship between any of the PPIs and these conditions. Furthermore, recent  RCT data with 3-year follow up showed only an association with enteric infection and not the other outcomes discussed above.  We indicated that daily chronic PPI use is indicated for those whose symptoms are negatively and significantly affecting their quality of life, those with erosive esophagitis, and those with Friend's esophagus. We also briefly discussed the surgical management of GERD. Lastly, we discussed the potential esophageal complications of GERD including esophagitis, stenosis/stricture formation and Friend's esophagus.  The latter may be seen in about 10% patients with chronic GERD.     - Will increase the omeprazole dose to 40 mg twice daily to be taken 30 to 40 minutes before breakfast on an empty stomach and 30 to 40 minutes before supper.  I emphasized the importance of adhering to twice daily dosing and make sure not to miss a dose.  - Patient will avoid reflux triggering foods including spicy food acidic foods citrusy food made coffee tea chocolate tomato based products alcohol carbonated beverages vinegar  - Patient will avoid foods and snacks 3 to 4 hours before bedtime.  - Patient will elevate the head of the bed while sleeping  - Will proceed with upper endoscopy for careful evaluation of the tubular esophagus and to rule out any evidence of reflux damage  - Return to clinic after testing ordered.  Depending on her improvement of typical reflux symptoms including acid regurgitation and her atypical symptom including chest pain, we will decide on proceeding with pH impedance on 4 mg twice daily PPI and high-resolution esophageal manometry to further evaluate chest pain    Follow up plan:   Return to clinic after EGD in 12 weeks and as needed.    The risks and benefits of my recommendations, as well as other treatment options were discussed with the patient and any available family today. All questions were answered.     Follow up: As planned above. Today, I personally spent 50 minutes spent  on the date of the encounter doing chart review, history and exam, documentation and further activities per the note.    The patient verbalized understanding of the plan and was appreciative for the time spent and information provided during the office visit.     Author:   Marcelo Monet MD    Director of Digital Health and [place]  Co-Director of Esophageal Disorders Program   of Medicine  Division of Gastroenterology, Hepatology and Nutrition    [facility] [phone]  [address] [place] [address]    Dr. Monet speaks for [employer] regarding vonoprazan. He receives income for these activities. When discussing the medication, patients were informed of Dr. Monet's role and potential conflict of interest. All questions and/or concerns were answered during the encounter.     Documentation assisted by voice recognition and documentation system.

## 2025-04-08 ENCOUNTER — TELEPHONE (OUTPATIENT)
Dept: GASTROENTEROLOGY | Facility: CLINIC | Age: 70
End: 2025-04-08
Payer: MEDICARE

## 2025-04-08 NOTE — TELEPHONE ENCOUNTER
Patient reached out to writer directly to discuss diet recommendations of reflux friendly lifestyle, patient anxious about following restricted diet for 8 weeks, writer reviewed recommendations and discussed alternative seasoning options, patient expressed feeling a lot better, ''    Thank you,  BHAVANA LozaN RN  [facility]  [facility]

## 2025-04-08 NOTE — TELEPHONE ENCOUNTER
"Endoscopy Scheduling Screen    Have you had any respiratory illness or flu-like symptoms in the last 10 days?  No    What is your communication preference for Instructions and/or Bowel Prep?   MyChart    What insurance is in the chart?  Other:  Medicare/BCBS     Ordering/Referring Provider:     LAUREN RODARTE       (If ordering provider performs procedure, schedule with ordering provider unless otherwise instructed. )    BMI: Estimated body mass index is 27.04 kg/m  as calculated from the following:    Height as of 4/7/25: 1.638 m (5' 4.5\").    Weight as of 4/7/25: 72.6 kg (160 lb).     Sedation Ordered  MAC/deep sedation.   BMI<= 45 45 < BMI <= 48 48 < BMI < = 50  BMI > 50   No Restrictions No [facility]  No [facility]  [facility] with exceptions Hospital Only OR Only       Do you have a history of malignant hyperthermia?  No    (Females) Are you currently pregnant?   No     Have you been diagnosed or told you have pulmonary hypertension?   No    Do you have an LVAD?  No    Have you been told you have moderate to severe sleep apnea?  No.    Have you been told you have COPD, asthma, or any other lung disease?  No    Has your doctor ordered any cardiac tests like echo, angiogram, stress test, ablation, or EKG, that you have not completed yet?  No    Do you  have a history of any heart conditions?  No     Have you ever had or are you waiting for an organ transplant?  No. Continue scheduling, no site restrictions.    Have you had a stroke or transient ischemic attack (TIA aka \"mini stroke\") in the last 2 years?   No.    Have you been diagnosed with or been told you have cirrhosis of the liver?   No.    Are you currently on dialysis?   No    Do you need assistance transferring?   No    BMI: Estimated body mass index is 27.04 kg/m  as calculated from the following:    Height as of 4/7/25: 1.638 m (5' 4.5\").    Weight as of 4/7/25: 72.6 kg (160 lb).     Is patients BMI > 40 and scheduling location [facility]?  No    Do you take an " injectable or oral medication for weight loss or diabetes (excluding insulin)?  No - metformin     Do you take the medication Naltrexone?  No    Do you take blood thinners?  No       Prep   Are you currently on dialysis or do you have chronic kidney disease?  No    Do you have a diagnosis of diabetes?  No    Do you have a diagnosis of cystic fibrosis (CF)?  No    On a regular basis do you go 3 -5 days between bowel movements?  Yes (Extended Prep)    BMI > 40?  No    Preferred Pharmacy:    [id number] - [place], [place] - [address] [facility] [address] [facility] [address] [address] [address] [facility] [id number]  Phone: [phone] Fax: [phone]      Final Scheduling Details     Procedure scheduled  Upper endoscopy (EGD)    Surgeon:  Chiki      Date of procedure:  07/31/2025     Pre-OP / PAC:   No - Not required for this site.    Location  [facility] - Per order.    Sedation   MAC/Deep Sedation - Per order.      Patient Reminders:   You will receive a call from a Nurse to review instructions and health history.  This assessment must be completed prior to your procedure.  Failure to complete the Nurse assessment may result in the procedure being cancelled.      On the day of your procedure, please designate an adult(s) who can drive you home stay with you for the next 24 hours. The medicines used in the exam will make you sleepy. You will not be able to drive.      You cannot take public transportation, ride share services, or non-medical taxi service without a responsible caregiver.  Medical transport services are allowed with the requirement that a responsible caregiver will receive you at your destination.  We require that drivers and caregivers are confirmed prior to your procedure.

## 2025-05-07 SDOH — HEALTH STABILITY: PHYSICAL HEALTH: ON AVERAGE, HOW MANY DAYS PER WEEK DO YOU ENGAGE IN MODERATE TO STRENUOUS EXERCISE (LIKE A BRISK WALK)?: 3 DAYS

## 2025-05-07 SDOH — HEALTH STABILITY: PHYSICAL HEALTH: ON AVERAGE, HOW MANY MINUTES DO YOU ENGAGE IN EXERCISE AT THIS LEVEL?: 30 MIN

## 2025-05-07 ASSESSMENT — SOCIAL DETERMINANTS OF HEALTH (SDOH): HOW OFTEN DO YOU GET TOGETHER WITH FRIENDS OR RELATIVES?: ONCE A WEEK

## 2025-05-12 ENCOUNTER — OFFICE VISIT (OUTPATIENT)
Dept: FAMILY MEDICINE | Facility: CLINIC | Age: 70
End: 2025-05-12
Payer: MEDICARE

## 2025-05-12 VITALS
RESPIRATION RATE: 17 BRPM | WEIGHT: 165 LBS | OXYGEN SATURATION: 98 % | HEART RATE: 68 BPM | SYSTOLIC BLOOD PRESSURE: 117 MMHG | DIASTOLIC BLOOD PRESSURE: 75 MMHG | TEMPERATURE: 97.7 F | BODY MASS INDEX: 27.49 KG/M2 | HEIGHT: 65 IN

## 2025-05-12 DIAGNOSIS — I10 ESSENTIAL HYPERTENSION: ICD-10-CM

## 2025-05-12 DIAGNOSIS — Z00.00 ENCOUNTER FOR MEDICARE ANNUAL WELLNESS EXAM: Primary | ICD-10-CM

## 2025-05-12 DIAGNOSIS — K21.9 GASTROESOPHAGEAL REFLUX DISEASE WITHOUT ESOPHAGITIS: ICD-10-CM

## 2025-05-12 DIAGNOSIS — E78.5 HYPERLIPIDEMIA WITH TARGET LDL LESS THAN 130: ICD-10-CM

## 2025-05-12 DIAGNOSIS — E28.2 PCOS (POLYCYSTIC OVARIAN SYNDROME): ICD-10-CM

## 2025-05-12 DIAGNOSIS — R73.01 ELEVATED FASTING BLOOD SUGAR: ICD-10-CM

## 2025-05-12 DIAGNOSIS — M85.89 OSTEOPENIA OF MULTIPLE SITES: ICD-10-CM

## 2025-05-12 DIAGNOSIS — N95.2 POST-MENOPAUSAL ATROPHIC VAGINITIS: ICD-10-CM

## 2025-05-12 PROBLEM — H90.5 UNILATERAL SENSORINEURAL HEARING LOSS: Status: ACTIVE | Noted: 2025-05-12

## 2025-05-12 LAB
ANION GAP SERPL CALCULATED.3IONS-SCNC: 10 MMOL/L (ref 7–15)
BUN SERPL-MCNC: 19 MG/DL (ref 8–23)
CALCIUM SERPL-MCNC: 9.5 MG/DL (ref 8.8–10.4)
CHLORIDE SERPL-SCNC: 102 MMOL/L (ref 98–107)
CHOLEST SERPL-MCNC: 213 MG/DL
CREAT SERPL-MCNC: 0.71 MG/DL (ref 0.51–1.17)
EGFRCR SERPLBLD CKD-EPI 2021: >90 ML/MIN/1.73M2
FASTING STATUS PATIENT QL REPORTED: YES
FASTING STATUS PATIENT QL REPORTED: YES
GLUCOSE SERPL-MCNC: 101 MG/DL (ref 70–99)
HCO3 SERPL-SCNC: 29 MMOL/L (ref 22–29)
HDLC SERPL-MCNC: 92 MG/DL
LDLC SERPL CALC-MCNC: 109 MG/DL
NONHDLC SERPL-MCNC: 121 MG/DL
POTASSIUM SERPL-SCNC: 4.6 MMOL/L (ref 3.4–5.3)
SODIUM SERPL-SCNC: 141 MMOL/L (ref 135–145)
TRIGL SERPL-MCNC: 60 MG/DL

## 2025-05-12 PROCEDURE — G2211 COMPLEX E/M VISIT ADD ON: HCPCS | Performed by: STUDENT IN AN ORGANIZED HEALTH CARE EDUCATION/TRAINING PROGRAM

## 2025-05-12 PROCEDURE — 3074F SYST BP LT 130 MM HG: CPT | Performed by: STUDENT IN AN ORGANIZED HEALTH CARE EDUCATION/TRAINING PROGRAM

## 2025-05-12 PROCEDURE — 80061 LIPID PANEL: CPT | Performed by: STUDENT IN AN ORGANIZED HEALTH CARE EDUCATION/TRAINING PROGRAM

## 2025-05-12 PROCEDURE — G0439 PPPS, SUBSEQ VISIT: HCPCS | Performed by: STUDENT IN AN ORGANIZED HEALTH CARE EDUCATION/TRAINING PROGRAM

## 2025-05-12 PROCEDURE — 36415 COLL VENOUS BLD VENIPUNCTURE: CPT | Performed by: STUDENT IN AN ORGANIZED HEALTH CARE EDUCATION/TRAINING PROGRAM

## 2025-05-12 PROCEDURE — 99214 OFFICE O/P EST MOD 30 MIN: CPT | Mod: 25 | Performed by: STUDENT IN AN ORGANIZED HEALTH CARE EDUCATION/TRAINING PROGRAM

## 2025-05-12 PROCEDURE — 1126F AMNT PAIN NOTED NONE PRSNT: CPT | Performed by: STUDENT IN AN ORGANIZED HEALTH CARE EDUCATION/TRAINING PROGRAM

## 2025-05-12 PROCEDURE — 80048 BASIC METABOLIC PNL TOTAL CA: CPT | Performed by: STUDENT IN AN ORGANIZED HEALTH CARE EDUCATION/TRAINING PROGRAM

## 2025-05-12 PROCEDURE — 3078F DIAST BP <80 MM HG: CPT | Performed by: STUDENT IN AN ORGANIZED HEALTH CARE EDUCATION/TRAINING PROGRAM

## 2025-05-12 RX ORDER — HYDROCHLOROTHIAZIDE 12.5 MG/1
1 CAPSULE ORAL DAILY
Qty: 90 CAPSULE | Refills: 3 | Status: SHIPPED | OUTPATIENT
Start: 2025-05-12

## 2025-05-12 RX ORDER — METFORMIN HYDROCHLORIDE 500 MG/1
500 TABLET, EXTENDED RELEASE ORAL
Qty: 90 TABLET | Refills: 3 | Status: SHIPPED | OUTPATIENT
Start: 2025-05-12

## 2025-05-12 ASSESSMENT — PAIN SCALES - GENERAL: PAINLEVEL_OUTOF10: NO PAIN (0)

## 2025-05-12 NOTE — PATIENT INSTRUCTIONS
Patient Education   Preventive Care Advice   This is general advice given by our system to help you stay healthy. However, your care team may have specific advice just for you. Please talk to your care team about your preventive care needs.  Nutrition  Eat 5 or more servings of fruits and vegetables each day.  Try wheat bread, brown rice and whole grain pasta (instead of white bread, rice, and pasta).  Get enough calcium and vitamin D. Check the label on foods and aim for 100% of the RDA (recommended daily allowance).  Lifestyle  Exercise at least 150 minutes each week  (30 minutes a day, 5 days a week).  Do muscle strengthening activities 2 days a week. These help control your weight and prevent disease.  No smoking.  Wear sunscreen to prevent skin cancer.  Have a dental exam and cleaning every 6 months.  Yearly exams  See your health care team every year to talk about:  Any changes in your health.  Any medicines your care team has prescribed.  Preventive care, family planning, and ways to prevent chronic diseases.  Shots (vaccines)   HPV shots (up to age 26), if you've never had them before.  Hepatitis B shots (up to age 59), if you've never had them before.  COVID-19 shot: Get this shot when it's due.  Flu shot: Get a flu shot every year.  Tetanus shot: Get a tetanus shot every 10 years.  Pneumococcal, hepatitis A, and RSV shots: Ask your care team if you need these based on your risk.  Shingles shot (for age 50 and up)  General health tests  Diabetes screening:  Starting at age 35, Get screened for diabetes at least every 3 years.  If you are younger than age 35, ask your care team if you should be screened for diabetes.  Cholesterol test: At age 39, start having a cholesterol test every 5 years, or more often if advised.  Bone density scan (DEXA): At age 50, ask your care team if you should have this scan for osteoporosis (brittle bones).  Hepatitis C: Get tested at least once in your life.  STIs (sexually  transmitted infections)  Before age 24: Ask your care team if you should be screened for STIs.  After age 24: Get screened for STIs if you're at risk. You are at risk for STIs (including HIV) if:  You are sexually active with more than one person.  You don't use condoms every time.  You or a partner was diagnosed with a sexually transmitted infection.  If you are at risk for HIV, ask about PrEP medicine to prevent HIV.  Get tested for HIV at least once in your life, whether you are at risk for HIV or not.  Cancer screening tests  Cervical cancer screening: If you have a cervix, begin getting regular cervical cancer screening tests starting at age 21.  Breast cancer scan (mammogram): If you've ever had breasts, begin having regular mammograms starting at age 40. This is a scan to check for breast cancer.  Colon cancer screening: It is important to start screening for colon cancer at age 45.  Have a colonoscopy test every 10 years (or more often if you're at risk) Or, ask your provider about stool tests like a FIT test every year or Cologuard test every 3 years.  To learn more about your testing options, visit:   .  For help making a decision, visit:   https://bit.ly/oo95518.  Prostate cancer screening test: If you have a prostate, ask your care team if a prostate cancer screening test (PSA) at age 55 is right for you.  Lung cancer screening: If you are a current or former smoker ages 50 to 80, ask your care team if ongoing lung cancer screenings are right for you.  For informational purposes only. Not to replace the advice of your health care provider. Copyright   2023 [facility] [employer]. All rights reserved. Clinically reviewed by the [facility] Transitions Program. [employer] 043724 - REV 01/24.  Bladder Training: Care Instructions  Your Care Instructions     Bladder training is used to treat urge incontinence and stress incontinence. Urge incontinence means that the need to urinate comes on so fast  that you can't get to a toilet in time. Stress incontinence means that you leak urine because of pressure on your bladder. For example, it may happen when you laugh, cough, or lift something heavy.  Bladder training can increase how long you can wait before you have to urinate. It can also help your bladder hold more urine. And it can give you better control over the urge to urinate.  It is important to remember that bladder training takes a few weeks to a few months to make a difference. You may not see results right away, but don't give up.  Follow-up care is a key part of your treatment and safety. Be sure to make and go to all appointments, and call your doctor if you are having problems. It's also a good idea to know your test results and keep a list of the medicines you take.  How can you care for yourself at home?  Work with your doctor to come up with a bladder training program that is right for you. You may use one or more of the following methods.  Delayed urination  In the beginning, try to keep from urinating for 5 minutes after you first feel the need to go.  While you wait, take deep, slow breaths to relax. Kegel exercises can also help you delay the need to go to the bathroom.  After some practice, when you can easily wait 5 minutes to urinate, try to wait 10 minutes before you urinate.  Slowly increase the waiting period until you are able to control when you have to urinate.  Scheduled urination  Empty your bladder when you first wake up in the morning.  Schedule times throughout the day when you will urinate.  Start by going to the bathroom every hour, even if you don't need to go.  Slowly increase the time between trips to the bathroom.  When you have found a schedule that works well for you, keep doing it.  If you wake up during the night and have to urinate, do it. Apply your schedule to waking hours only.  Kegel exercises  These tighten and strengthen pelvic muscles, which can help you control  "the flow of urine. (If doing these exercises causes pain, stop doing them and talk with your doctor.) To do Kegel exercises:  Squeeze your muscles as if you were trying not to pass gas. Or squeeze your muscles as if you were stopping the flow of urine. Your belly, legs, and buttocks shouldn't move.  Hold the squeeze for 3 seconds, then relax for 5 to 10 seconds.  Start with 3 seconds, then add 1 second each week until you are able to squeeze for 10 seconds.  Repeat the exercise 10 times a session. Do 3 to 8 sessions a day.  When should you call for help?  Watch closely for changes in your health, and be sure to contact your doctor if:    Your incontinence is getting worse.     You do not get better as expected.   Where can you learn more?  Go to https://www.[employer].net/patiented  Enter V684 in the search box to learn more about \"Bladder Training: Care Instructions.\"  Current as of: April 30, 2024  Content Version: 14.4    5625-4479 [facility].   Care instructions adapted under license by your healthcare professional. If you have questions about a medical condition or this instruction, always ask your healthcare professional. [facility] disclaims any warranty or liability for your use of this information.    Bladder Training: Care Instructions  Your Care Instructions     Bladder training is used to treat urge incontinence and stress incontinence. Urge incontinence means that the need to urinate comes on so fast that you can't get to a toilet in time. Stress incontinence means that you leak urine because of pressure on your bladder. For example, it may happen when you laugh, cough, or lift something heavy.  Bladder training can increase how long you can wait before you have to urinate. It can also help your bladder hold more urine. And it can give you better control over the urge to urinate.  It is important to remember that bladder training takes a few weeks to a few months to make a " difference. You may not see results right away, but don't give up.  Follow-up care is a key part of your treatment and safety. Be sure to make and go to all appointments, and call your doctor if you are having problems. It's also a good idea to know your test results and keep a list of the medicines you take.  How can you care for yourself at home?  Work with your doctor to come up with a bladder training program that is right for you. You may use one or more of the following methods.  Delayed urination  In the beginning, try to keep from urinating for 5 minutes after you first feel the need to go.  While you wait, take deep, slow breaths to relax. Kegel exercises can also help you delay the need to go to the bathroom.  After some practice, when you can easily wait 5 minutes to urinate, try to wait 10 minutes before you urinate.  Slowly increase the waiting period until you are able to control when you have to urinate.  Scheduled urination  Empty your bladder when you first wake up in the morning.  Schedule times throughout the day when you will urinate.  Start by going to the bathroom every hour, even if you don't need to go.  Slowly increase the time between trips to the bathroom.  When you have found a schedule that works well for you, keep doing it.  If you wake up during the night and have to urinate, do it. Apply your schedule to waking hours only.  Kegel exercises  These tighten and strengthen pelvic muscles, which can help you control the flow of urine. (If doing these exercises causes pain, stop doing them and talk with your doctor.) To do Kegel exercises:  Squeeze your muscles as if you were trying not to pass gas. Or squeeze your muscles as if you were stopping the flow of urine. Your belly, legs, and buttocks shouldn't move.  Hold the squeeze for 3 seconds, then relax for 5 to 10 seconds.  Start with 3 seconds, then add 1 second each week until you are able to squeeze for 10 seconds.  Repeat the exercise  "10 times a session. Do 3 to 8 sessions a day.  When should you call for help?  Watch closely for changes in your health, and be sure to contact your doctor if:    Your incontinence is getting worse.     You do not get better as expected.   Where can you learn more?  Go to https://www.[employer].net/patiented  Enter V684 in the search box to learn more about \"Bladder Training: Care Instructions.\"  Current as of: April 30, 2024  Content Version: 14.4    [id number] [employer].   Care instructions adapted under license by your healthcare professional. If you have questions about a medical condition or this instruction, always ask your healthcare professional. [employer] disclaims any warranty or liability for your use of this information.       "

## 2025-05-12 NOTE — PROGRESS NOTES
"Preventive Care Visit  [facility]  Leonel [facility] MD, Family Medicine  May 12, 2025    Assessment & Plan     Encounter for Medicare annual wellness exam  Doing well overall. Due for fasting lipid, obtained. UTD on DEXA, mammo, colonoscopy. Discussed vaccination recommendations.     Osteopenia of multiple sites  Based on DEXA from 2023. Discussed conservative therapies. Plan for repeat DEXA in 2028.     Elevated fasting blood sugar  On 500mg metformin. Ok to refill for 1 yr.   - Hemoglobin A1c    Essential hypertension  Well controlled on 12.5mg hydrochlorothiazide daily. Ok to refill for 1 yr.   - BASIC METABOLIC PANEL    Hyperlipidemia with target LDL less than 130  On 20mg simvastatin. Hx of LDL>190; plan for repeat lipid today but will likely need to transition to high intensity statin which was discussed with her today.   - Lipid panel reflex to direct LDL Fasting    Gastroesophageal reflux disease without esophagitis  Following with GI who is prescribing 40mg omeprazole BID. See GI note for further details.    Post-menopausal atrophic vaginitis  On estradiol cream which is prescribed through OB.     BMI  Estimated body mass index is 27.88 kg/m  as calculated from the following:    Height as of this encounter: 1.638 m (5' 4.5\").    Weight as of this encounter: 74.8 kg (165 lb).     The longitudinal plan of care for the diagnosis(es)/condition(s) as documented were addressed during this visit. Due to the added complexity in care, I will continue to support Brittany in the subsequent management and with ongoing continuity of care.    Counseling  Appropriate preventive services were addressed with this patient via screening, questionnaire, or discussion as appropriate for fall prevention, nutrition, physical activity, Tobacco-use cessation, social engagement, weight loss and cognition.  Checklist reviewing preventive services available has been given to the patient.  Reviewed patient's diet, " addressing concerns and/or questions.   She is at risk for lack of exercise and has been provided with information to increase physical activity for the benefit of her well-being.   Information on urinary incontinence and treatment options given to patient.     Follow up in one year, earlier if needed    Leonel Costa MD  [facility] [place]  5/12/2025      Paresh Soto is a 70 year old, presenting for the following:  Medicare Visit        5/12/2025     6:41 AM   Additional Questions   Roomed by Kacy Rosales [facility]     HPI    GERD  Following with GI.   Told to stop probiotics, no longer vomitus so symptoms have somewhat improved.   Will be starting strict diet on 6/9/25 to last for 8 weeks which includes elimination of coffee, caffeine, spices, vinegar with a follow up EGD afterwards.    HTN  On hydrochlorothiazide daily    Atrophic vaginitis  Gets estradiol cream by Kusum Yang  Next follow up with her is October, 2025.     PCOS  On 500mg metformin daily.  Fasting sugars are typically 104 or less.   Decreased exercise this year so she predicts that A1c will be a little higher.     Advance Care Planning  Document on file is a Health Care Directive or POLST.        5/7/2025   General Health   How would you rate your overall physical health? Good   Feel stress (tense, anxious, or unable to sleep) Not at all         5/7/2025   Nutrition   Diet: Other   If other, please elaborate: GERD diet starting on June 9th for 8 weeks         5/7/2025   Exercise   Days per week of moderate/strenous exercise 3 days   Average minutes spent exercising at this level 30 min         5/7/2025   Social Factors   Frequency of gathering with friends or relatives Once a week   Worry food won't last until get money to buy more No   Food not last or not have enough money for food? No   Do you have housing? (Housing is defined as stable permanent housing and does not include staying outside in a car, in a tent, in an abandoned  building, in an overnight shelter, or couch-surfing.) Yes   Are you worried about losing your housing? No   Lack of transportation? No   Unable to get utilities (heat,electricity)? No         2025   Fall Risk   Fallen 2 or more times in the past year? No   Trouble with walking or balance? No          2025   Activities of Daily Living- Home Safety   Needs help with the following daily activites None of the above   Safety concerns in the home None of the above         2025   Dental   Dentist two times every year? Yes         2025   Hearing Screening   Hearing concerns? None of the above         2025   Driving Risk Screening   Patient/family members have concerns about driving No         2025   General Alertness/Fatigue Screening   Have you been more tired than usual lately? No         2025   Urinary Incontinence Screening   Bothered by leaking urine in past 6 months Yes     Today's PHQ-2 Score:       2025     9:07 AM   PHQ-2 (  [employer])   Q1: Little interest or pleasure in doing things 0   Q2: Feeling down, depressed or hopeless 0   PHQ-2 Score 0    Q1: Little interest or pleasure in doing things Not at all   Q2: Feeling down, depressed or hopeless Not at all   PHQ-2 Score 0       Patient-reported         2025   Substance Use   Alcohol more than 3/day or more than 7/wk No   Do you have a current opioid prescription? No   How severe/bad is pain from 1 to 10? 3/10   Do you use any other substances recreationally? No     Social History     Tobacco Use    Smoking status: Former     Current packs/day: 0.00     Types: Cigarettes     Quit date: 1980     Years since quittin.3     Passive exposure: Past    Smokeless tobacco: Never   Vaping Use    Vaping status: Never Used   Substance Use Topics    Alcohol use: Yes     Comment: 5 a week    Drug use: No         2024   LAST FHS-7 RESULTS   1st degree relative breast or ovarian cancer No   Any relative bilateral breast cancer  No   Any male have breast cancer No   Any ONE woman have BOTH breast AND ovarian cancer No   Any woman with breast cancer before 50yrs No   2 or more relatives with breast AND/OR ovarian cancer Yes   2 or more relatives with breast AND/OR bowel cancer No     Mammogram Screening - Mammogram every 1-2 years updated in Health Maintenance based on mutual decision making    History of abnormal Pap smear: No - age 65 or older with pap indicated due to inadequate prior screening (3 consecutive negative cytology results, 2 consecutive negative cotesting results, or 2 consecutive negative HrHPV test results within 10 years, with the most recent test occurring within the recommended screening interval for the test used)        Latest Ref Rng & Units 8/15/2019     8:19 AM 8/15/2019     8:08 AM 3/18/2016     9:30 AM   PAP / HPV   PAP (Historical)   NIL     HPV 16 DNA NEG^Negative Negative   Negative    HPV 18 DNA NEG^Negative Negative   Negative    Other HR HPV NEG^Negative Negative   Negative      ASCVD Risk   The 10-year ASCVD risk score (Merna TRIPLETT, et al., 2019) is: 7.5%    Values used to calculate the score:      Age: 70 years      Sex: Female      Is Non- : No      Diabetic: No      Tobacco smoker: No      Systolic Blood Pressure: 117 mmHg      Is BP treated: Yes      HDL Cholesterol: 99 mg/dL      Total Cholesterol: 218 mg/dL    Reviewed and updated as needed this visit by Provider   Tobacco   Meds    Surg Hx  Fam Hx            Current providers sharing in care for this patient include:  Patient Care Team:  Leonel Costa MD as PCP - General (Family Practice)  Aditya Agrawal MD as MD (Gastroenterology)  Leonel Costa MD as Assigned PCP  Angelica Iniguez AuD as Audiologist (Audiology)  Abimael Farias MD as MD (Otolaryngology)  Marcelo Monet MD as Physician (Gastroenterology)  Marcelo Monet MD as Assigned Gastroenterology Provider    The following health maintenance items are  "reviewed in Epic and correct as of today:  Health Maintenance   Topic Date Due    HEPATITIS B IMMUNIZATION (1 of 3 - Risk 3-dose series) Never done    COVID-19 Vaccine (5 - 2024-25 season) 09/01/2024    BMP  05/10/2025    LIPID  05/10/2025    MEDICARE ANNUAL WELLNESS VISIT  05/10/2025    ANNUAL REVIEW OF HM ORDERS  08/19/2025    FALL RISK ASSESSMENT  05/12/2026    MAMMO SCREENING  11/11/2026    DIABETES SCREENING  08/12/2027    COLORECTAL CANCER SCREENING  09/26/2029    ADVANCE CARE PLANNING  05/12/2030    RSV VACCINE (1 - 1-dose 75+ series) 03/11/2032    DTAP/TDAP/TD IMMUNIZATION (3 - Td or Tdap) 03/13/2034    DEXA  06/01/2038    HEPATITIS C SCREENING  Completed    MIGRAINE ACTION PLAN  Completed    PHQ-2 (once per calendar year)  Completed    INFLUENZA VACCINE  Completed    Pneumococcal Vaccine: 50+ Years  Completed    ZOSTER IMMUNIZATION  Completed    HPV IMMUNIZATION  Aged Out    MENINGITIS IMMUNIZATION  Aged Out    PAP  Discontinued    HEPATITIS A IMMUNIZATION  Discontinued          Objective    Exam  /75 (BP Location: Right arm, Patient Position: Sitting, Cuff Size: Adult Regular)   Pulse 68   Temp 97.7  F (36.5  C) (Oral)   Resp 17   Ht 1.638 m (5' 4.5\")   Wt 74.8 kg (165 lb)   LMP 10/05/2012   SpO2 98%   BMI 27.88 kg/m     Estimated body mass index is 27.88 kg/m  as calculated from the following:    Height as of this encounter: 1.638 m (5' 4.5\").    Weight as of this encounter: 74.8 kg (165 lb).    Physical Exam  GENERAL: healthy, alert and no distress  HEAD: Normocephalic, atraumatic.   EYES: PERRL. Normal conjunctivae, sclera.   ENT: Normal EAC and TMs bilaterally. Normal oropharynx.   NECK: Supple. No lymphadenopathy appreciated. Trachea midline. Thyroid not enlarged, not TTP.  RESP: lungs clear to auscultation - no rales, rhonchi or wheezes  CV: regular rate and rhythm, normal S1 S2, no murmur, click, rub or gallop.  No peripheral swelling noted.   ABDOMEN: soft, no TTP x4 quadrants. No " hepatomegaly or masses appreciated. BS normactive.  MSK: no gross musculoskeletal defects noted.  SKIN: no suspicious lesions or rashes.  EXT: Warm and well perfused.   NEURO: CNII-XII grossly intact. No focal deficits.  PSYCH: Groomed, dressed appropriately for weather.  Logical, linear thought process. Normal mood with consistent affect.         5/12/2025   Mini Cog   Clock Draw Score 2 Normal   3 Item Recall 3 objects recalled   Mini Cog Total Score 5     Signed Electronically by: Leonel Costa MD

## 2025-05-13 ENCOUNTER — LAB (OUTPATIENT)
Dept: LAB | Facility: CLINIC | Age: 70
End: 2025-05-13
Payer: MEDICARE

## 2025-05-13 ENCOUNTER — MYC MEDICAL ADVICE (OUTPATIENT)
Dept: FAMILY MEDICINE | Facility: CLINIC | Age: 70
End: 2025-05-13
Payer: MEDICARE

## 2025-05-13 ENCOUNTER — RESULTS FOLLOW-UP (OUTPATIENT)
Dept: FAMILY MEDICINE | Facility: CLINIC | Age: 70
End: 2025-05-13

## 2025-05-13 DIAGNOSIS — E28.2 PCOS (POLYCYSTIC OVARIAN SYNDROME): ICD-10-CM

## 2025-05-13 DIAGNOSIS — Z13.1 ENCOUNTER FOR SCREENING FOR DIABETES MELLITUS: ICD-10-CM

## 2025-05-13 DIAGNOSIS — E78.5 HYPERLIPIDEMIA WITH TARGET LDL LESS THAN 130: Primary | ICD-10-CM

## 2025-05-13 DIAGNOSIS — E28.2 PCOS (POLYCYSTIC OVARIAN SYNDROME): Primary | ICD-10-CM

## 2025-05-13 LAB
EST. AVERAGE GLUCOSE BLD GHB EST-MCNC: 105 MG/DL
HBA1C MFR BLD: 5.3 % (ref 0–5.6)

## 2025-05-13 PROCEDURE — 83036 HEMOGLOBIN GLYCOSYLATED A1C: CPT | Mod: GZ

## 2025-05-13 RX ORDER — ATORVASTATIN CALCIUM 20 MG/1
20 TABLET, FILM COATED ORAL DAILY
Qty: 90 TABLET | Refills: 3 | Status: SHIPPED | OUTPATIENT
Start: 2025-05-13

## 2025-05-13 NOTE — TELEPHONE ENCOUNTER
Please see Dayana.     RN spoke to [facility] lab. Order for A1c is not able to be released, as date is for 7/17/2025. A new order needs to be placed.     Ara Ambriz RN   [facility]

## 2025-06-05 ENCOUNTER — TELEPHONE (OUTPATIENT)
Dept: GASTROENTEROLOGY | Facility: CLINIC | Age: 70
End: 2025-06-05
Payer: MEDICARE

## 2025-06-05 NOTE — TELEPHONE ENCOUNTER
Caller: Writer to Pt    Reason for Reschedule/Cancellation (please be detailed, any staff messages or encounters to note?):   Chiki released    Did you cancel or rescheduled an EUS procedure? No.    Is screening questionnaire older than 3 months from the reschedule date.   If Yes, please complete screening questionnaire. No 4/8/25    Prior to reschedule please review:  Ordering Provider: LAUREN RODARTE   Sedation Determined: MAC  Does patient have any ASC Exclusions, please identify?: No    Notes on Cancelled Procedure:  Procedure: Upper Endoscopy [EGD]   Date: 7/31/25  Location: [facility]; [address] [place] [address]   Surgeon: CHIKI    Rescheduled: Yes,   Procedure: Upper Endoscopy [EGD]    Date: 6/19/25   Location: [facility]; [address] [place] [address]    Surgeon: CHIKI   Sedation Level Scheduled  MAC ,  Reason for Sedation Level Per order   Instructions updated and sent: candace doyle     Does patient need PAC or Pre -Op Rescheduled? : no

## 2025-06-05 NOTE — TELEPHONE ENCOUNTER
Pre visit planning completed.      Procedure details:    Patient scheduled for Upper endoscopy (EGD) on 6.19.25.     Arrival time: 0630. Procedure time 0800    Facility location: [facility]; [address] [place] [address], [address] Floor, [place] [address]. Check in location: Main entrance at registration desk.    Sedation type: MAC    Pre op exam needed? No.    Indication for procedure: GERD      Chart review:     Electronic implanted devices? No    Recent diagnosis of diverticulitis within the last 6 weeks? N/A      Medication review:    Diabetic? No    Anticoagulants? No    Weight loss medication/injectable? No.    Other medication HOLDING recommendations:  Metformin for PCOS - hold morning of procedure      Prep for procedure:     EGD    Procedure information and instructions sent via [employer]         Roxane Layne RN  Endoscopy Procedure Pre Assessment   [phone] option 3

## 2025-06-05 NOTE — TELEPHONE ENCOUNTER
Pre assessment completed for upcoming procedure.   (Please see previous telephone encounter notes for complete details)    Patient returned call.       Procedure details:    Procedure date 6/19/25, arrival time 0630 and facility location reviewed.    Pre op exam needed? No.    Designated  policy reviewed and that site requests drivers to check in and stay on campus. Instructed to have someone stay 24  hours post procedure.   *Disclaimer - please notify the [facility] Op Manager with any  issues/concerns.    Medication review:    Metformin -- Pt takes in the evening for PCOS.       Prep for procedure:    Procedure prep instructions reviewed.        Any additional information needed:  N/A      Patient verbalized understanding and had no questions or concerns at this time.      Giovanna Flynn RN  Endoscopy Procedure Pre Assessment   [phone] option 3

## 2025-06-05 NOTE — TELEPHONE ENCOUNTER
Attempted to contact patient in order to complete pre assessment questions.     No answer. Left message to return call to [phone] option 3.    Callback communication sent via [employer].    Korin Danielson LPN

## 2025-06-19 ENCOUNTER — ANESTHESIA EVENT (OUTPATIENT)
Dept: GASTROENTEROLOGY | Facility: CLINIC | Age: 70
End: 2025-06-19
Payer: MEDICARE

## 2025-06-19 ENCOUNTER — ANESTHESIA (OUTPATIENT)
Dept: GASTROENTEROLOGY | Facility: CLINIC | Age: 70
End: 2025-06-19
Payer: MEDICARE

## 2025-06-19 ENCOUNTER — HOSPITAL ENCOUNTER (OUTPATIENT)
Facility: CLINIC | Age: 70
Discharge: HOME | End: 2025-06-19
Attending: STUDENT IN AN ORGANIZED HEALTH CARE EDUCATION/TRAINING PROGRAM | Admitting: STUDENT IN AN ORGANIZED HEALTH CARE EDUCATION/TRAINING PROGRAM
Payer: MEDICARE

## 2025-06-19 VITALS
RESPIRATION RATE: 16 BRPM | TEMPERATURE: 98.1 F | DIASTOLIC BLOOD PRESSURE: 86 MMHG | SYSTOLIC BLOOD PRESSURE: 108 MMHG | OXYGEN SATURATION: 98 % | HEART RATE: 63 BPM

## 2025-06-19 LAB — UPPER GI ENDOSCOPY: NORMAL

## 2025-06-19 PROCEDURE — 258N000003 HC RX IP 258 OP 636: Performed by: NURSE ANESTHETIST, CERTIFIED REGISTERED

## 2025-06-19 PROCEDURE — 370N000017 HC ANESTHESIA TECHNICAL FEE, PER MIN: Performed by: STUDENT IN AN ORGANIZED HEALTH CARE EDUCATION/TRAINING PROGRAM

## 2025-06-19 PROCEDURE — 250N000011 HC RX IP 250 OP 636: Performed by: NURSE ANESTHETIST, CERTIFIED REGISTERED

## 2025-06-19 PROCEDURE — 250N000009 HC RX 250: Performed by: NURSE ANESTHETIST, CERTIFIED REGISTERED

## 2025-06-19 PROCEDURE — 43235 EGD DIAGNOSTIC BRUSH WASH: CPT | Performed by: STUDENT IN AN ORGANIZED HEALTH CARE EDUCATION/TRAINING PROGRAM

## 2025-06-19 RX ORDER — NALOXONE HYDROCHLORIDE 0.4 MG/ML
0.4 INJECTION, SOLUTION INTRAMUSCULAR; INTRAVENOUS; SUBCUTANEOUS
Status: CANCELLED | OUTPATIENT
Start: 2025-06-19

## 2025-06-19 RX ORDER — NALOXONE HYDROCHLORIDE 0.4 MG/ML
0.2 INJECTION, SOLUTION INTRAMUSCULAR; INTRAVENOUS; SUBCUTANEOUS
Status: CANCELLED | OUTPATIENT
Start: 2025-06-19

## 2025-06-19 RX ORDER — PROPOFOL 10 MG/ML
INJECTION, EMULSION INTRAVENOUS CONTINUOUS PRN
Status: DISCONTINUED | OUTPATIENT
Start: 2025-06-19 | End: 2025-06-19

## 2025-06-19 RX ORDER — PROCHLORPERAZINE MALEATE 5 MG/1
5 TABLET ORAL EVERY 6 HOURS PRN
Status: CANCELLED | OUTPATIENT
Start: 2025-06-19

## 2025-06-19 RX ORDER — ONDANSETRON 4 MG/1
4 TABLET, ORALLY DISINTEGRATING ORAL EVERY 6 HOURS PRN
Status: CANCELLED | OUTPATIENT
Start: 2025-06-19

## 2025-06-19 RX ORDER — LIDOCAINE 40 MG/G
CREAM TOPICAL
Status: DISCONTINUED | OUTPATIENT
Start: 2025-06-19 | End: 2025-06-19 | Stop reason: HOSPADM

## 2025-06-19 RX ORDER — SODIUM CHLORIDE, SODIUM LACTATE, POTASSIUM CHLORIDE, CALCIUM CHLORIDE 600; 310; 30; 20 MG/100ML; MG/100ML; MG/100ML; MG/100ML
INJECTION, SOLUTION INTRAVENOUS CONTINUOUS PRN
Status: DISCONTINUED | OUTPATIENT
Start: 2025-06-19 | End: 2025-06-19

## 2025-06-19 RX ORDER — LIDOCAINE HYDROCHLORIDE 20 MG/ML
INJECTION, SOLUTION INFILTRATION; PERINEURAL PRN
Status: DISCONTINUED | OUTPATIENT
Start: 2025-06-19 | End: 2025-06-19

## 2025-06-19 RX ORDER — ONDANSETRON 2 MG/ML
4 INJECTION INTRAMUSCULAR; INTRAVENOUS
Status: DISCONTINUED | OUTPATIENT
Start: 2025-06-19 | End: 2025-06-19 | Stop reason: HOSPADM

## 2025-06-19 RX ORDER — ONDANSETRON 2 MG/ML
4 INJECTION INTRAMUSCULAR; INTRAVENOUS EVERY 6 HOURS PRN
Status: CANCELLED | OUTPATIENT
Start: 2025-06-19

## 2025-06-19 RX ORDER — FLUMAZENIL 0.1 MG/ML
0.2 INJECTION, SOLUTION INTRAVENOUS
Status: CANCELLED | OUTPATIENT
Start: 2025-06-19 | End: 2025-06-19

## 2025-06-19 RX ORDER — PROPOFOL 10 MG/ML
INJECTION, EMULSION INTRAVENOUS PRN
Status: DISCONTINUED | OUTPATIENT
Start: 2025-06-19 | End: 2025-06-19

## 2025-06-19 RX ADMIN — PROPOFOL 20 MG: 10 INJECTION, EMULSION INTRAVENOUS at 12:28

## 2025-06-19 RX ADMIN — PROPOFOL 150 MCG/KG/MIN: 10 INJECTION, EMULSION INTRAVENOUS at 12:25

## 2025-06-19 RX ADMIN — SODIUM CHLORIDE, SODIUM LACTATE, POTASSIUM CHLORIDE, AND CALCIUM CHLORIDE: .6; .31; .03; .02 INJECTION, SOLUTION INTRAVENOUS at 12:25

## 2025-06-19 RX ADMIN — LIDOCAINE HYDROCHLORIDE 80 MG: 20 INJECTION, SOLUTION INFILTRATION; PERINEURAL at 12:25

## 2025-06-19 RX ADMIN — PROPOFOL 30 MG: 10 INJECTION, EMULSION INTRAVENOUS at 12:32

## 2025-06-19 ASSESSMENT — ACTIVITIES OF DAILY LIVING (ADL)
ADLS_ACUITY_SCORE: 42

## 2025-06-19 NOTE — ANESTHESIA POSTPROCEDURE EVALUATION
Patient: Brittany Chan    Procedure: Procedure(s):  Esophagoscopy, gastroscopy, duodenoscopy (EGD), combined       Anesthesia Type:  MAC    Note:  Disposition: Outpatient   Postop Pain Control: Uneventful            Sign Out: Well controlled pain   PONV: No   Neuro/Psych: Uneventful            Sign Out: Acceptable/Baseline neuro status   Airway/Respiratory: Uneventful            Sign Out: Acceptable/Baseline resp. status   CV/Hemodynamics: Uneventful            Sign Out: Acceptable CV status; No obvious hypovolemia; No obvious fluid overload   Other NRE:    DID A NON-ROUTINE EVENT OCCUR? No           Last vitals:  Vitals Value Taken Time   /56 06/19/25 13:10   Temp     Pulse     Resp 16 06/19/25 12:50   SpO2 99 % 06/19/25 13:17   Vitals shown include unfiled device data.    Electronically Signed By: Daja Yeung MD  June 19, 2025  1:37 PM

## 2025-06-19 NOTE — ANESTHESIA CARE TRANSFER NOTE
Patient: Brittany Chan    Procedure: Procedure(s):  Esophagoscopy, gastroscopy, duodenoscopy (EGD), combined       Diagnosis: Gastroesophageal reflux disease without esophagitis [K21.9]  Chest pain, unspecified type [R07.9]  Diagnosis Additional Information: No value filed.    Anesthesia Type:   MAC     Note:    Oropharynx: oropharynx clear of all foreign objects  Level of Consciousness: awake  Oxygen Supplementation: room air    Independent Airway: airway patency satisfactory and stable  Dentition: dentition unchanged  Vital Signs Stable: post-procedure vital signs reviewed and stable  Report to RN Given: handoff report given  Patient transferred to: Phase II    Handoff Report: Identifed the Patient, Identified the Reponsible Provider, Reviewed the pertinent medical history, Discussed the surgical course, Reviewed Intra-OP anesthesia mangement and issues during anesthesia, Set expectations for post-procedure period and Allowed opportunity for questions and acknowledgement of understanding      Vitals:  Vitals Value Taken Time   BP 93/69    Temp 98    Pulse 63    Resp 16    SpO2 98 % 06/19/25 12:50   Vitals shown include unfiled device data.    Electronically Signed By: ELI Renner CRNA  June 19, 2025  12:50 PM

## 2025-06-19 NOTE — H&P
Brittany Chan  [id number]  adult  70 year old      Reason for procedure/surgery: follow up of reflux    Patient Active Problem List   Diagnosis    Loss of height    Family history of other endocrine and metabolic diseases    Flatulence, eructation, and gas pain    Esophageal reflux    PCOS (polycystic ovarian syndrome)    Hemorrhoid    Hyperlipidemia with target LDL less than 130    Family history of diabetes mellitus    Atypical squamous cells cannot exclude high grade squamous intraepithelial lesion on cytologic smear of cervix (ASC-H)    Stress incontinence    Overweight (BMI 25.0-29.9)    Anxiety    Osteoarthritis of finger, unspecified laterality    Eczema, unspecified type    Essential hypertension    Post-menopausal atrophic vaginitis    Osteopenia of multiple sites    Macular pucker, bilateral    Elevated fasting blood sugar    Unilateral sensorineural hearing loss       Past Surgical History:    Past Surgical History:   Procedure Laterality Date    BREAST LUMPECTOMY, RT/LT  1978    lt fibroadenoma    CATARACT EXTRACTION Right 01/06/2025    COLONOSCOPY      COLONOSCOPY Left 09/26/2019    Procedure: COLONOSCOPY;  Surgeon: Renita Pablo MD;  Location:  GI    DAVINCI PELVIC PROCEDURE N/A 12/14/2020    Procedure: Robotic assisted sacrocolpopexy, supracervical hysterectomy with bilateral salpingo-oophorectomy, abdominal enterocele repair, midurethral sling, cystoscopy, and  posterior repair;  Surgeon: Gail Yang MD;  Location:  OR    ESOPHAGOSCOPY, GASTROSCOPY, DUODENOSCOPY (EGD), COMBINED N/A 08/05/2016    Procedure: COMBINED ESOPHAGOSCOPY, GASTROSCOPY, DUODENOSCOPY (EGD);  Surgeon: Feroz Ramirez MD;  Location:  GI    ESOPHAGOSCOPY, GASTROSCOPY, DUODENOSCOPY (EGD), COMBINED N/A 05/18/2022    Procedure: ESOPHAGOGASTRODUODENOSCOPY, WITH BIOPSY;  Surgeon: Aditya Agrawal MD;  Location:  GI    ORTHOPEDIC SURGERY Right 2014    rotator cuff repair    TONSILLECTOMY & ADENOIDECTOMY       VITRECTOMY ANTERIOR Bilateral        Past Medical History:   Past Medical History:   Diagnosis Date    Abnormal Pap smear, can't excl hi gd sq intraepithelial lesion (ASC-H) 2011    Arthritis     Cancer (H)     Complication of anesthesia     Gets tachycardic from epi in Dental numbing injections    GERD (gastroesophageal reflux disease)     High cholesterol     High cholesterol     Incontinence of urine in female     Pelvic floor relaxation        Social History:   Social History     Tobacco Use    Smoking status: Former     Current packs/day: 0.00     Types: Cigarettes     Quit date: 1980     Years since quittin.4     Passive exposure: Past    Smokeless tobacco: Never   Substance Use Topics    Alcohol use: Yes     Comment: 5 a week       Family History:   Family History   Problem Relation Age of Onset    Allergies Mother         nuts    Arthritis Mother     Osteoporosis Mother     Hyperlipidemia Mother     Memory loss Mother     Hypertension Father     Prostate Cancer Father     Eye Disorder Father         macular degeneration    Diabetes Father     Obesity Sister     Gynecology Sister         polycystic ovaries    Obesity Sister     Anxiety Disorder Daughter     Diabetes Nephew         type 2    Diabetes Nephew        Allergies:   Allergies   Allergen Reactions    Lisinopril Cough    Thimerosal      preservative in contact solution -       Active Medications:   No current outpatient medications on file.       Systemic Review:   CONSTITUTIONAL: NEGATIVE for fever, chills, change in weight  ENT/MOUTH: NEGATIVE for ear, mouth and throat problems  RESP: NEGATIVE for significant cough or SOB  CV: NEGATIVE for chest pain, palpitations or peripheral edema    Physical Examination:   Vital Signs: /65 (BP Location: Left arm)   Pulse 63   Temp 98.1  F (36.7  C) (Oral)   Resp 16   LMP 10/05/2012   SpO2 98%   GENERAL: healthy, alert and no distress  NECK: no adenopathy, no asymmetry, masses, or  scars  RESP: lungs clear to auscultation - no rales, rhonchi or wheezes  CV: regular rate and rhythm, normal S1 S2, no S3 or S4, no murmur, click or rub, no peripheral edema and peripheral pulses strong  ABDOMEN: soft, nontender, no hepatosplenomegaly, no masses and bowel sounds normal  MS: no gross musculoskeletal defects noted, no edema    Plan: Appropriate to proceed as scheduled.      Marcelo Monet MD  6/19/2025    PCP:  Leonel Costa

## 2025-06-19 NOTE — ANESTHESIA PREPROCEDURE EVALUATION
Anesthesia Pre-Procedure Evaluation    Patient: Brittany Chan   MRN: [id number] : 1955          Procedure : Procedure(s):  Esophagoscopy, gastroscopy, duodenoscopy (EGD), combined         Past Medical History:   Diagnosis Date    Abnormal Pap smear, can't excl hi gd sq intraepithelial lesion (ASC-H) 2011    Arthritis     Cancer (H)     Complication of anesthesia     Gets tachycardic from epi in Dental numbing injections    GERD (gastroesophageal reflux disease)     High cholesterol     High cholesterol     Incontinence of urine in female     Pelvic floor relaxation       Past Surgical History:   Procedure Laterality Date    BREAST LUMPECTOMY, RT/LT      lt fibroadenoma    CATARACT EXTRACTION Right 2025    COLONOSCOPY      COLONOSCOPY Left 2019    Procedure: COLONOSCOPY;  Surgeon: Renita Pablo MD;  Location:  GI    DAVINCI PELVIC PROCEDURE N/A 2020    Procedure: Robotic assisted sacrocolpopexy, supracervical hysterectomy with bilateral salpingo-oophorectomy, abdominal enterocele repair, midurethral sling, cystoscopy, and  posterior repair;  Surgeon: Gail Yang MD;  Location:  OR    ESOPHAGOSCOPY, GASTROSCOPY, DUODENOSCOPY (EGD), COMBINED N/A 2016    Procedure: COMBINED ESOPHAGOSCOPY, GASTROSCOPY, DUODENOSCOPY (EGD);  Surgeon: Feroz Ramirez MD;  Location:  GI    ESOPHAGOSCOPY, GASTROSCOPY, DUODENOSCOPY (EGD), COMBINED N/A 2022    Procedure: ESOPHAGOGASTRODUODENOSCOPY, WITH BIOPSY;  Surgeon: Aditya Agrawal MD;  Location:  GI    ORTHOPEDIC SURGERY Right     rotator cuff repair    TONSILLECTOMY & ADENOIDECTOMY      VITRECTOMY ANTERIOR Bilateral       Allergies   Allergen Reactions    Lisinopril Cough    Thimerosal      preservative in contact solution -      Social History     Tobacco Use    Smoking status: Former     Current packs/day: 0.00     Types: Cigarettes     Quit date: 1980     Years since quittin.4     Passive  "exposure: Past    Smokeless tobacco: Never   Substance Use Topics    Alcohol use: Yes     Comment: 5 a week      Wt Readings from Last 1 Encounters:   05/12/25 74.8 kg (165 lb)        Anesthesia Evaluation            ROS/MED HX  ENT/Pulmonary:       Neurologic:       Cardiovascular:     (+) Dyslipidemia hypertension- -   -  - -                                      METS/Exercise Tolerance:     Hematologic:       Musculoskeletal:   (+)  arthritis,             GI/Hepatic: Comment: Gastroesophageal reflux disease without esophagitis     (+) GERD,                   Renal/Genitourinary:       Endo:       Psychiatric/Substance Use:     (+) psychiatric history anxiety       Infectious Disease:       Malignancy:       Other:              Physical Exam  Airway  Mallampati: II  TM distance: >3 FB  Neck ROM: full  Mouth opening: >= 4 cm    Cardiovascular - normal exam   Dental   (+) Modest Abnormalities - crowns, retainers, 1 or 2 missing teeth      Pulmonary - normal exam      Neurological - normal exam  She appears awake, alert and oriented x3.    Other Findings       OUTSIDE LABS:  CBC:   Lab Results   Component Value Date    WBC 5.1 01/19/2023    WBC 5.3 01/20/2022    HGB 13.4 01/19/2023    HGB 14.2 11/17/2022    HCT 41.3 01/19/2023    HCT 48.2 01/20/2022     01/19/2023     01/20/2022     BMP:   Lab Results   Component Value Date     05/12/2025     05/10/2024    POTASSIUM 4.6 05/12/2025    POTASSIUM 4.2 05/10/2024    CHLORIDE 102 05/12/2025    CHLORIDE 100 05/10/2024    CO2 29 05/12/2025    CO2 28 05/10/2024    BUN 19.0 05/12/2025    BUN 16.5 05/10/2024    CR 0.71 05/12/2025    CR 0.67 05/10/2024     (H) 05/12/2025    GLC 89 05/10/2024     COAGS: No results found for: \"PTT\", \"INR\", \"FIBR\"  POC:   Lab Results   Component Value Date    BGM 88 09/26/2019     HEPATIC:   Lab Results   Component Value Date    ALBUMIN 4.4 01/19/2023    PROTTOTAL 7.0 01/19/2023    ALT 19 01/19/2023    AST 20 " 01/19/2023    ALKPHOS 76 01/19/2023    BILITOTAL 0.4 01/19/2023     OTHER:   Lab Results   Component Value Date    A1C 5.3 05/13/2025    BILL 9.5 05/12/2025    TSH 1.38 07/28/2021       Anesthesia Plan    ASA Status:  2      NPO Status: NPO Appropriate   Anesthesia Type: MAC.  Airway: natural airway.  Induction: intravenous.  Maintenance: TIVA.   Techniques and Equipment:       - Monitoring Plan: standard ASA monitoring     Consents    Anesthesia Plan(s) and associated risks, benefits, and realistic alternatives discussed. Questions answered and patient/representative(s) expressed understanding.     - Discussed: anesthesiologist, CRNA     - Discussed with:  Patient        - Pt is DNR/DNI Status: no DNR     Blood Consent:      - Discussed with: not discussed.     Postoperative Care    Pain management: multimodal analgesia.     Comments:                   Daja Yeung MD    I have reviewed the pertinent notes and labs in the chart from the past 30 days and (re)examined the patient.  Any updates or changes from those notes are reflected in this note.    Clinically Significant Risk Factors Present on Admission                   # Hypertension: Noted on problem list

## 2025-06-19 NOTE — OR NURSING
Pt underwent EGD under MAC. Specimens: none. Pt transferred to recovery and report given to meg MAYO.       Daja Fuentes RN

## 2025-08-04 ENCOUNTER — OFFICE VISIT (OUTPATIENT)
Dept: GASTROENTEROLOGY | Facility: CLINIC | Age: 70
End: 2025-08-04
Attending: STUDENT IN AN ORGANIZED HEALTH CARE EDUCATION/TRAINING PROGRAM
Payer: MEDICARE

## 2025-08-04 VITALS
SYSTOLIC BLOOD PRESSURE: 153 MMHG | HEIGHT: 65 IN | BODY MASS INDEX: 27.19 KG/M2 | HEART RATE: 62 BPM | OXYGEN SATURATION: 98 % | DIASTOLIC BLOOD PRESSURE: 83 MMHG | WEIGHT: 163.2 LBS

## 2025-08-04 DIAGNOSIS — R07.9 CHEST PAIN, UNSPECIFIED TYPE: Primary | ICD-10-CM

## 2025-08-04 PROCEDURE — 99214 OFFICE O/P EST MOD 30 MIN: CPT | Performed by: STUDENT IN AN ORGANIZED HEALTH CARE EDUCATION/TRAINING PROGRAM

## 2025-08-04 PROCEDURE — 1126F AMNT PAIN NOTED NONE PRSNT: CPT | Performed by: STUDENT IN AN ORGANIZED HEALTH CARE EDUCATION/TRAINING PROGRAM

## 2025-08-04 PROCEDURE — 3079F DIAST BP 80-89 MM HG: CPT | Performed by: STUDENT IN AN ORGANIZED HEALTH CARE EDUCATION/TRAINING PROGRAM

## 2025-08-04 PROCEDURE — 3077F SYST BP >= 140 MM HG: CPT | Performed by: STUDENT IN AN ORGANIZED HEALTH CARE EDUCATION/TRAINING PROGRAM

## 2025-08-04 ASSESSMENT — PAIN SCALES - GENERAL: PAINLEVEL_OUTOF10: NO PAIN (0)

## 2025-08-05 ENCOUNTER — PATIENT OUTREACH (OUTPATIENT)
Dept: CARE COORDINATION | Facility: CLINIC | Age: 70
End: 2025-08-05
Payer: MEDICARE

## 2025-08-06 ENCOUNTER — TELEPHONE (OUTPATIENT)
Dept: GASTROENTEROLOGY | Facility: CLINIC | Age: 70
End: 2025-08-06
Payer: MEDICARE

## 2025-08-21 ENCOUNTER — TELEPHONE (OUTPATIENT)
Dept: GASTROENTEROLOGY | Facility: CLINIC | Age: 70
End: 2025-08-21
Payer: MEDICARE

## (undated) DEVICE — SUCTION TIP YANKAUER W/O VENT K86

## (undated) DEVICE — ENDO TROCAR FIRST ENTRY KII FIOS Z-THRD 12X150MM CTF71

## (undated) DEVICE — TUBING INSUFFLATION W/FILTER 10FT GS1016

## (undated) DEVICE — NDL INSUFFLATION 13GA 120MM C2201

## (undated) DEVICE — DRAPE VAGI BAG 18X9" 1072

## (undated) DEVICE — DRAPE MAYO STAND 23X54 8337

## (undated) DEVICE — GLOVE PROTEXIS BLUE W/NEU-THERA 6.0  2D73EB60

## (undated) DEVICE — PROTECTOR ARM ONE-STEP TRENDELENBURG 40418

## (undated) DEVICE — SOL NACL 0.9% INJ 250ML BAG 2B1322Q

## (undated) DEVICE — SOL NACL 0.9% IRRIG 1000ML BOTTLE 2F7124

## (undated) DEVICE — ESU GROUND PAD ADULT W/CORD E7507

## (undated) DEVICE — Device

## (undated) DEVICE — SU PDS II 0 CT-2 27" Z334H

## (undated) DEVICE — TUBING SUCTION 12"X1/4" N612

## (undated) DEVICE — ENDO POUCH UNIVERSAL RETRIEVAL SYSTEM INZII 5MM CD003

## (undated) DEVICE — KIT PATIENT POSITIONING PIGAZZI LATEX FREE 40580

## (undated) DEVICE — SU VICRYL 2-0 CT-2 27" J333H

## (undated) DEVICE — DAVINCI HOT SHEARS TIP COVER  400180

## (undated) DEVICE — LINEN ORTHO ACL PACK 5447

## (undated) DEVICE — GLOVE PROTEXIS POWDER FREE 6.5 ORTHOPEDIC 2D73ET65

## (undated) DEVICE — SOL WATER IRRIG 3000ML BAG 2B7117

## (undated) DEVICE — SU VICRYL 0 CT-2 27" J334H

## (undated) DEVICE — SPONGE RAY-TEC 3X3" 30-094

## (undated) DEVICE — DRAPE UNDER BUTTOCK 89415

## (undated) DEVICE — SU MONOCRYL 3-0 PS-2 27" Y427H

## (undated) DEVICE — ADH SKIN CLOSURE PREMIERPRO EXOFIN MICOR HV 0.5ML 3471

## (undated) DEVICE — PACK DAVINCI UROLOGY SBA15UDFSG

## (undated) DEVICE — CATH FOLEY 16FR 5ML LUBRICATH LATEX 0165L16

## (undated) DEVICE — DAVINCI DRAPE KIT 4-ARM 420291

## (undated) DEVICE — SU PDS II 2-0 CT-2 27"  Z333H

## (undated) DEVICE — TUBING IRRIG TUR Y TYPE 96" LF 6543-01

## (undated) DEVICE — SOL NACL 0.9% INJ 1000ML BAG 2B1324X

## (undated) DEVICE — DAVINCI S CANNULA SEAL 8MM 400077

## (undated) DEVICE — DAVINCI OBTURATOR 8MM BLADELESS 420023

## (undated) DEVICE — SU MONOCRYL 0 CT-2 27" Y334H

## (undated) DEVICE — KIT ENDO TURNOVER/PROCEDURE W/CLEAN A SCOPE LINERS 103888

## (undated) DEVICE — SPONGE RAY-TEC 4X8" 7318

## (undated) DEVICE — ESU CORD MONOPOLAR 10'  E0510

## (undated) DEVICE — SU VICRYL 0 CT-1 27" J340H

## (undated) RX ORDER — PROPOFOL 10 MG/ML
INJECTION, EMULSION INTRAVENOUS
Status: DISPENSED
Start: 2025-06-19

## (undated) RX ORDER — LIDOCAINE HYDROCHLORIDE 10 MG/ML
INJECTION, SOLUTION EPIDURAL; INFILTRATION; INTRACAUDAL; PERINEURAL
Status: DISPENSED
Start: 2020-12-14

## (undated) RX ORDER — ONDANSETRON 2 MG/ML
INJECTION INTRAMUSCULAR; INTRAVENOUS
Status: DISPENSED
Start: 2020-12-14

## (undated) RX ORDER — BUPIVACAINE HYDROCHLORIDE 5 MG/ML
INJECTION, SOLUTION EPIDURAL; INTRACAUDAL
Status: DISPENSED
Start: 2020-12-14

## (undated) RX ORDER — FENTANYL CITRATE 50 UG/ML
INJECTION, SOLUTION INTRAMUSCULAR; INTRAVENOUS
Status: DISPENSED
Start: 2020-12-14

## (undated) RX ORDER — CEFAZOLIN SODIUM 1 G/3ML
INJECTION, POWDER, FOR SOLUTION INTRAMUSCULAR; INTRAVENOUS
Status: DISPENSED
Start: 2020-12-14

## (undated) RX ORDER — PROPOFOL 10 MG/ML
INJECTION, EMULSION INTRAVENOUS
Status: DISPENSED
Start: 2020-12-14

## (undated) RX ORDER — FENTANYL CITRATE 50 UG/ML
INJECTION, SOLUTION INTRAMUSCULAR; INTRAVENOUS
Status: DISPENSED
Start: 2019-09-26

## (undated) RX ORDER — HYDROMORPHONE HYDROCHLORIDE 1 MG/ML
INJECTION, SOLUTION INTRAMUSCULAR; INTRAVENOUS; SUBCUTANEOUS
Status: DISPENSED
Start: 2020-12-14

## (undated) RX ORDER — FENTANYL CITRATE 50 UG/ML
INJECTION, SOLUTION INTRAMUSCULAR; INTRAVENOUS
Status: DISPENSED
Start: 2022-05-18

## (undated) RX ORDER — DEXAMETHASONE SODIUM PHOSPHATE 4 MG/ML
INJECTION, SOLUTION INTRA-ARTICULAR; INTRALESIONAL; INTRAMUSCULAR; INTRAVENOUS; SOFT TISSUE
Status: DISPENSED
Start: 2020-12-14

## (undated) RX ORDER — GLYCOPYRROLATE 0.2 MG/ML
INJECTION INTRAMUSCULAR; INTRAVENOUS
Status: DISPENSED
Start: 2020-12-14

## (undated) RX ORDER — CEFAZOLIN SODIUM 2 G/100ML
INJECTION, SOLUTION INTRAVENOUS
Status: DISPENSED
Start: 2020-12-14

## (undated) RX ORDER — LIDOCAINE HYDROCHLORIDE AND EPINEPHRINE 10; 10 MG/ML; UG/ML
INJECTION, SOLUTION INFILTRATION; PERINEURAL
Status: DISPENSED
Start: 2020-12-14

## (undated) RX ORDER — FENTANYL CITRATE-0.9 % NACL/PF 10 MCG/ML
PLASTIC BAG, INJECTION (ML) INTRAVENOUS
Status: DISPENSED
Start: 2020-12-14